# Patient Record
Sex: FEMALE | Race: WHITE | NOT HISPANIC OR LATINO | Employment: FULL TIME | URBAN - METROPOLITAN AREA
[De-identification: names, ages, dates, MRNs, and addresses within clinical notes are randomized per-mention and may not be internally consistent; named-entity substitution may affect disease eponyms.]

---

## 2017-01-29 ENCOUNTER — OFFICE VISIT - HEALTHEAST (OUTPATIENT)
Dept: FAMILY MEDICINE | Facility: CLINIC | Age: 57
End: 2017-01-29

## 2017-01-29 DIAGNOSIS — R05.9 COUGH: ICD-10-CM

## 2017-01-29 DIAGNOSIS — J40 BRONCHITIS: ICD-10-CM

## 2017-01-29 RX ORDER — ALBUTEROL SULFATE 90 UG/1
2 AEROSOL, METERED RESPIRATORY (INHALATION) EVERY 4 HOURS PRN
Qty: 1 INHALER | Refills: 0 | Status: SHIPPED | OUTPATIENT
Start: 2017-01-29 | End: 2023-12-23

## 2021-05-26 ENCOUNTER — RECORDS - HEALTHEAST (OUTPATIENT)
Dept: ADMINISTRATIVE | Facility: CLINIC | Age: 61
End: 2021-05-26

## 2021-05-29 ENCOUNTER — RECORDS - HEALTHEAST (OUTPATIENT)
Dept: ADMINISTRATIVE | Facility: CLINIC | Age: 61
End: 2021-05-29

## 2021-05-30 VITALS — WEIGHT: 123.4 LBS

## 2021-06-08 NOTE — PROGRESS NOTES
ASSESSMENT:   1. Cough  XR Chest PA and Lateral    benzonatate (TESSALON PERLES) 100 MG capsule   2. Bronchitis  albuterol (PROVENTIL HFA;VENTOLIN HFA) 90 mcg/actuation inhaler    predniSONE (DELTASONE) 20 MG tablet         History suggestive of influenza last week, followed by worsening cough. Top differentials include post-infectious bronchitis and secondary pneumonia. CXR negative for infiltrates. As she is afebrile now, and has not had fevers for 2 days, less likely pneumonia. I will treat for post-infectious bronchitis. I am using oral steroids in addition to inhaled albuterol due to severity of cough.  History and exam not suggestive of PE. No pulmonary edema or cardiomegaly on CXR suggesting heart failure or myocarditis following influenza. I do not think further workup is indicated today.    At the end of the encounter, I discussed results, diagnosis, medications. Discussed red flags for immediate return to clinic/ER, as well as indications for follow up if no improvement. Patient understood and agreed to plan. Patient was stable for discharge.    *I was masked during all patient interactions. Patient was also masked from time of registration.        PLAN:  Your chest xray does not show any signs of pneumonia.    The most likely cause for your symptoms is a post-infectious bronchitis, which can occur after a viral respiratory infection like influenza that you had last week.    This is not an infectious condition. However, it can be very uncomfortable.    Treatment is decreasing inflammation of bronchioles and lungs.  Please take prednisone daily x 5 days in the morning. Take with food.  Please use albuterol inhaler as needed every 4-6 hours for cough, wheezing. Take 2 puffs at a time.    I also sent a prescription for Tessalon Perles cough suppressant drops for the cough to your pharmacy. Take as needed.    If persistent cough or other symptoms in 7-10 days, follow up with your primary care provider.    If  return of fevers, coughing up blood, worsening pain or shortness of breath, or any other concerning symptoms, come back or go to ER immediately.          SUBJECTIVE:   Sherry King is a 56 y.o. female who presents today for evaluation of cough and chest congestion for the last 5 days. Her daughter was diagnosed with influenza on Monday, 1/23, and then patient developed fevers, body aches the day after. Fevers lasted until Friday, 1/27. She developed a bad cough on Friday. The cough is wet. It is worse at night. She complains of a lot of chest congestion. She denies shortness of breath, or hemoptysis. She admits to chest soreness from coughing- worse with deep inhalation or talking. No exertional worsening of chest pain. No nausea, vomiting. She had diarrhea last week but this is gone now. No sinus pressure, rhinorrhea, sore throat, or ear pain. She has a decreased appetite but is able to tolerate fluid intake. She has been taking Tylenol Cold and Flu for symptoms which helps somewhat.  No history of chronic lung disease. No recent travel.      Past Medical History:  Otherwise healthy; no chronic medical conditions    Surgical History:  Reviewed; Non-contributory      Family History:  Mother- colon cancer, TIAs  Father- CAD        Social History:    History   Smoking Status     Not on file   Smokeless Tobacco     Not on file     Smoking: none  Alcohol use: occasionally  Other drug use: none  Occupation: retail management      Current Medications:  No current outpatient prescriptions on file prior to visit.     No current facility-administered medications on file prior to visit.        Allergies:   No Known Allergies    I personally reviewed patient's past medical, surgical, social, family history and allergies.    ROS:  Review of Systems  See HPI      OBJECTIVE:   Visit Vitals     /60     Pulse 79     Temp 98.2  F (36.8  C) (Oral)     Resp 20     Wt 123 lb 6.4 oz (56 kg)     SpO2 98%         General  Appearance:  Alert, well-appearing female in NAD. Afebrile.    Integument: Warm, dry, no diaphoresis.  HEENT:  Head: Atraumatic, normocephalic. Face nontraumatic.  Eyes: Conjunctiva clear, Lids normal.  Ears:  TMs pearly, translucent bilaterally. No canal erythema or edema.  Nose: nares patent. Mild erythema of nasal mucosa. No rhinorrhea. No sinus tenderness.  Oropharynx:  No trismus. ++ posterior pharyngeal erythema. No tonsillar hypertrophy. Uvula midline. Moist mucus membranes.  Neck: Supple, + shotty anterior lymphadenopathy. No meningismus.  Respiratory: No distress. No wheezes. Bibasilar crackles. No rhonchi.   Cardiovascular: Regular rate and rhythm, no murmur, rub or gallop. No obvious chest wall deformities. Peripheral pulses 2+ bilaterally at radials. No peripheral edema.  Musculoskeletal: No calf tenderness.  Neurologic: Alert and orientated appropriately. No focal deficits.           Radiology:  I personally ordered and viewed this study. I agree with below radiology findings.    Xr Chest Pa And Lateral    Result Date: 1/29/2017  XR CHEST PA AND LATERAL1/29/2017 1:46 PMINDICATION: coughCOMPARISON: None.FINDINGS: Negative chest.This report was electronically interpreted by: Dr. Emilee Camejo MD ON 01/29/2017 at 14:06

## 2023-11-26 ENCOUNTER — TELEPHONE (OUTPATIENT)
Dept: OBGYN | Facility: CLINIC | Age: 63
End: 2023-11-26
Payer: COMMERCIAL

## 2023-11-26 DIAGNOSIS — M54.50 ACUTE LOW BACK PAIN, UNSPECIFIED BACK PAIN LATERALITY, UNSPECIFIED WHETHER SCIATICA PRESENT: Primary | ICD-10-CM

## 2023-11-26 RX ORDER — CYCLOBENZAPRINE HCL 10 MG
10 TABLET ORAL 3 TIMES DAILY PRN
Qty: 30 TABLET | Refills: 0 | Status: SHIPPED | OUTPATIENT
Start: 2023-11-26 | End: 2023-12-23

## 2023-12-23 ENCOUNTER — APPOINTMENT (OUTPATIENT)
Dept: CT IMAGING | Facility: HOSPITAL | Age: 63
End: 2023-12-23
Attending: EMERGENCY MEDICINE
Payer: COMMERCIAL

## 2023-12-23 ENCOUNTER — HOSPITAL ENCOUNTER (INPATIENT)
Facility: HOSPITAL | Age: 63
LOS: 4 days | Discharge: HOME | End: 2023-12-29
Attending: EMERGENCY MEDICINE | Admitting: FAMILY MEDICINE
Payer: COMMERCIAL

## 2023-12-23 ENCOUNTER — OFFICE VISIT (OUTPATIENT)
Dept: FAMILY MEDICINE | Facility: CLINIC | Age: 63
End: 2023-12-23
Payer: COMMERCIAL

## 2023-12-23 VITALS
RESPIRATION RATE: 16 BRPM | WEIGHT: 137 LBS | HEART RATE: 101 BPM | SYSTOLIC BLOOD PRESSURE: 114 MMHG | OXYGEN SATURATION: 99 % | DIASTOLIC BLOOD PRESSURE: 73 MMHG | TEMPERATURE: 97.5 F

## 2023-12-23 DIAGNOSIS — J81.0 ACUTE PULMONARY EDEMA (H): ICD-10-CM

## 2023-12-23 DIAGNOSIS — R07.9 CHEST PAIN, UNSPECIFIED TYPE: ICD-10-CM

## 2023-12-23 DIAGNOSIS — R07.9 ACUTE CHEST PAIN: Primary | ICD-10-CM

## 2023-12-23 DIAGNOSIS — R00.0 RACING HEART BEAT: ICD-10-CM

## 2023-12-23 DIAGNOSIS — R93.1 LVEF <40%: ICD-10-CM

## 2023-12-23 DIAGNOSIS — I44.7 LBBB (LEFT BUNDLE BRANCH BLOCK): Primary | ICD-10-CM

## 2023-12-23 LAB
ANION GAP SERPL CALCULATED.3IONS-SCNC: 14 MMOL/L (ref 7–15)
ATRIAL RATE - MUSE: 92 BPM
BASOPHILS # BLD AUTO: 0 10E3/UL (ref 0–0.2)
BASOPHILS NFR BLD AUTO: 1 %
BUN SERPL-MCNC: 15.4 MG/DL (ref 8–23)
CALCIUM SERPL-MCNC: 9.6 MG/DL (ref 8.8–10.2)
CHLORIDE SERPL-SCNC: 98 MMOL/L (ref 98–107)
CREAT SERPL-MCNC: 0.74 MG/DL (ref 0.51–0.95)
CREAT SERPL-MCNC: 0.78 MG/DL (ref 0.51–0.95)
D DIMER PPP FEU-MCNC: 2.76 UG/ML FEU (ref 0–0.5)
DEPRECATED HCO3 PLAS-SCNC: 26 MMOL/L (ref 22–29)
DIASTOLIC BLOOD PRESSURE - MUSE: NORMAL MMHG
EGFRCR SERPLBLD CKD-EPI 2021: 84 ML/MIN/1.73M2
EGFRCR SERPLBLD CKD-EPI 2021: 90 ML/MIN/1.73M2
EOSINOPHIL # BLD AUTO: 0.1 10E3/UL (ref 0–0.7)
EOSINOPHIL NFR BLD AUTO: 1 %
ERYTHROCYTE [DISTWIDTH] IN BLOOD BY AUTOMATED COUNT: 13.3 % (ref 10–15)
GLUCOSE SERPL-MCNC: 98 MG/DL (ref 70–99)
HCT VFR BLD AUTO: 38.6 % (ref 35–47)
HGB BLD-MCNC: 13 G/DL (ref 11.7–15.7)
IMM GRANULOCYTES # BLD: 0 10E3/UL
IMM GRANULOCYTES NFR BLD: 0 %
INTERPRETATION ECG - MUSE: NORMAL
LYMPHOCYTES # BLD AUTO: 1.3 10E3/UL (ref 0.8–5.3)
LYMPHOCYTES NFR BLD AUTO: 31 %
MAGNESIUM SERPL-MCNC: 1.9 MG/DL (ref 1.7–2.3)
MCH RBC QN AUTO: 32.7 PG (ref 26.5–33)
MCHC RBC AUTO-ENTMCNC: 33.7 G/DL (ref 31.5–36.5)
MCV RBC AUTO: 97 FL (ref 78–100)
MONOCYTES # BLD AUTO: 0.4 10E3/UL (ref 0–1.3)
MONOCYTES NFR BLD AUTO: 10 %
NEUTROPHILS # BLD AUTO: 2.5 10E3/UL (ref 1.6–8.3)
NEUTROPHILS NFR BLD AUTO: 57 %
NRBC # BLD AUTO: 0 10E3/UL
NRBC BLD AUTO-RTO: 0 /100
NT-PROBNP SERPL-MCNC: 2726 PG/ML (ref 0–900)
P AXIS - MUSE: 68 DEGREES
PLATELET # BLD AUTO: 174 10E3/UL (ref 150–450)
POTASSIUM SERPL-SCNC: 4.2 MMOL/L (ref 3.4–5.3)
PR INTERVAL - MUSE: 146 MS
QRS DURATION - MUSE: 138 MS
QT - MUSE: 434 MS
QTC - MUSE: 536 MS
R AXIS - MUSE: -50 DEGREES
RBC # BLD AUTO: 3.97 10E6/UL (ref 3.8–5.2)
SODIUM SERPL-SCNC: 138 MMOL/L (ref 135–145)
SYSTOLIC BLOOD PRESSURE - MUSE: NORMAL MMHG
T AXIS - MUSE: 108 DEGREES
T4 FREE SERPL-MCNC: 1.08 NG/DL (ref 0.9–1.7)
TROPONIN T SERPL HS-MCNC: 29 NG/L
TROPONIN T SERPL HS-MCNC: 30 NG/L
TSH SERPL DL<=0.005 MIU/L-ACNC: 7.94 UIU/ML (ref 0.3–4.2)
VENTRICULAR RATE- MUSE: 92 BPM
WBC # BLD AUTO: 4.3 10E3/UL (ref 4–11)

## 2023-12-23 PROCEDURE — 84484 ASSAY OF TROPONIN QUANT: CPT | Performed by: EMERGENCY MEDICINE

## 2023-12-23 PROCEDURE — 99205 OFFICE O/P NEW HI 60 MIN: CPT | Mod: 25 | Performed by: PHYSICIAN ASSISTANT

## 2023-12-23 PROCEDURE — 96372 THER/PROPH/DIAG INJ SC/IM: CPT

## 2023-12-23 PROCEDURE — 258N000003 HC RX IP 258 OP 636: Performed by: EMERGENCY MEDICINE

## 2023-12-23 PROCEDURE — 84443 ASSAY THYROID STIM HORMONE: CPT | Performed by: EMERGENCY MEDICINE

## 2023-12-23 PROCEDURE — G0378 HOSPITAL OBSERVATION PER HR: HCPCS

## 2023-12-23 PROCEDURE — 36415 COLL VENOUS BLD VENIPUNCTURE: CPT | Performed by: EMERGENCY MEDICINE

## 2023-12-23 PROCEDURE — 85041 AUTOMATED RBC COUNT: CPT | Performed by: EMERGENCY MEDICINE

## 2023-12-23 PROCEDURE — 250N000011 HC RX IP 250 OP 636: Mod: JZ

## 2023-12-23 PROCEDURE — 85379 FIBRIN DEGRADATION QUANT: CPT | Performed by: EMERGENCY MEDICINE

## 2023-12-23 PROCEDURE — 93010 ELECTROCARDIOGRAM REPORT: CPT | Performed by: GENERAL ACUTE CARE HOSPITAL

## 2023-12-23 PROCEDURE — 250N000011 HC RX IP 250 OP 636: Mod: JZ | Performed by: EMERGENCY MEDICINE

## 2023-12-23 PROCEDURE — 83880 ASSAY OF NATRIURETIC PEPTIDE: CPT | Performed by: EMERGENCY MEDICINE

## 2023-12-23 PROCEDURE — 82565 ASSAY OF CREATININE: CPT

## 2023-12-23 PROCEDURE — 96361 HYDRATE IV INFUSION ADD-ON: CPT

## 2023-12-23 PROCEDURE — 93005 ELECTROCARDIOGRAM TRACING: CPT | Performed by: PHYSICIAN ASSISTANT

## 2023-12-23 PROCEDURE — 93005 ELECTROCARDIOGRAM TRACING: CPT | Performed by: EMERGENCY MEDICINE

## 2023-12-23 PROCEDURE — 96374 THER/PROPH/DIAG INJ IV PUSH: CPT

## 2023-12-23 PROCEDURE — 80048 BASIC METABOLIC PNL TOTAL CA: CPT | Performed by: EMERGENCY MEDICINE

## 2023-12-23 PROCEDURE — 71275 CT ANGIOGRAPHY CHEST: CPT

## 2023-12-23 PROCEDURE — 99285 EMERGENCY DEPT VISIT HI MDM: CPT | Mod: 25

## 2023-12-23 PROCEDURE — 83735 ASSAY OF MAGNESIUM: CPT | Performed by: EMERGENCY MEDICINE

## 2023-12-23 PROCEDURE — 84439 ASSAY OF FREE THYROXINE: CPT | Performed by: EMERGENCY MEDICINE

## 2023-12-23 RX ORDER — AMOXICILLIN 250 MG
2 CAPSULE ORAL 2 TIMES DAILY PRN
Status: DISCONTINUED | OUTPATIENT
Start: 2023-12-23 | End: 2023-12-29 | Stop reason: HOSPADM

## 2023-12-23 RX ORDER — FUROSEMIDE 20 MG
20 TABLET ORAL
Status: DISCONTINUED | OUTPATIENT
Start: 2023-12-24 | End: 2023-12-26

## 2023-12-23 RX ORDER — ENOXAPARIN SODIUM 100 MG/ML
40 INJECTION SUBCUTANEOUS EVERY 24 HOURS
Status: DISCONTINUED | OUTPATIENT
Start: 2023-12-23 | End: 2023-12-29 | Stop reason: HOSPADM

## 2023-12-23 RX ORDER — LIDOCAINE 40 MG/G
CREAM TOPICAL
Status: DISCONTINUED | OUTPATIENT
Start: 2023-12-23 | End: 2023-12-26

## 2023-12-23 RX ORDER — AMOXICILLIN 250 MG
1 CAPSULE ORAL 2 TIMES DAILY PRN
Status: DISCONTINUED | OUTPATIENT
Start: 2023-12-23 | End: 2023-12-29 | Stop reason: HOSPADM

## 2023-12-23 RX ORDER — ACETAMINOPHEN 650 MG/1
650 SUPPOSITORY RECTAL EVERY 4 HOURS PRN
Status: DISCONTINUED | OUTPATIENT
Start: 2023-12-23 | End: 2023-12-29 | Stop reason: HOSPADM

## 2023-12-23 RX ORDER — SODIUM FLUORIDE 1.1 G/100G
1 CREAM ORAL DAILY
COMMUNITY
Start: 2023-12-22

## 2023-12-23 RX ORDER — IOPAMIDOL 755 MG/ML
90 INJECTION, SOLUTION INTRAVASCULAR ONCE
Status: COMPLETED | OUTPATIENT
Start: 2023-12-23 | End: 2023-12-23

## 2023-12-23 RX ORDER — ONDANSETRON 2 MG/ML
4 INJECTION INTRAMUSCULAR; INTRAVENOUS EVERY 6 HOURS PRN
Status: DISCONTINUED | OUTPATIENT
Start: 2023-12-23 | End: 2023-12-25

## 2023-12-23 RX ORDER — PROCHLORPERAZINE 25 MG
25 SUPPOSITORY, RECTAL RECTAL EVERY 12 HOURS PRN
Status: DISCONTINUED | OUTPATIENT
Start: 2023-12-23 | End: 2023-12-25

## 2023-12-23 RX ORDER — ONDANSETRON 4 MG/1
4 TABLET, ORALLY DISINTEGRATING ORAL EVERY 6 HOURS PRN
Status: DISCONTINUED | OUTPATIENT
Start: 2023-12-23 | End: 2023-12-25

## 2023-12-23 RX ORDER — ACETAMINOPHEN 325 MG/1
650 TABLET ORAL EVERY 4 HOURS PRN
Status: DISCONTINUED | OUTPATIENT
Start: 2023-12-23 | End: 2023-12-29 | Stop reason: HOSPADM

## 2023-12-23 RX ORDER — PROCHLORPERAZINE MALEATE 10 MG
10 TABLET ORAL EVERY 6 HOURS PRN
Status: DISCONTINUED | OUTPATIENT
Start: 2023-12-23 | End: 2023-12-25

## 2023-12-23 RX ORDER — CALCIUM CARBONATE 500 MG/1
1000 TABLET, CHEWABLE ORAL 4 TIMES DAILY PRN
Status: DISCONTINUED | OUTPATIENT
Start: 2023-12-23 | End: 2023-12-29 | Stop reason: HOSPADM

## 2023-12-23 RX ORDER — FUROSEMIDE 10 MG/ML
20 INJECTION INTRAMUSCULAR; INTRAVENOUS ONCE
Status: COMPLETED | OUTPATIENT
Start: 2023-12-23 | End: 2023-12-23

## 2023-12-23 RX ADMIN — IOPAMIDOL 90 ML: 755 INJECTION, SOLUTION INTRAVENOUS at 19:05

## 2023-12-23 RX ADMIN — ENOXAPARIN SODIUM 40 MG: 40 INJECTION SUBCUTANEOUS at 22:42

## 2023-12-23 RX ADMIN — SODIUM CHLORIDE 500 ML: 9 INJECTION, SOLUTION INTRAVENOUS at 18:10

## 2023-12-23 RX ADMIN — FUROSEMIDE 20 MG: 10 INJECTION, SOLUTION INTRAMUSCULAR; INTRAVENOUS at 19:58

## 2023-12-23 ASSESSMENT — ACTIVITIES OF DAILY LIVING (ADL)
ADLS_ACUITY_SCORE: 35

## 2023-12-23 NOTE — PROGRESS NOTES
Patient presents with:  Chest Pain: X2d, comes and goes, chest tightness, racing heartbeat, chills/sweats    (R07.9) Acute chest pain  (primary encounter diagnosis)  Comment: with chest tightness and fatigue.  No current pain at urgent care now.    Plan: EKG 12-lead, tracing only, CANCELED: EKG         12-lead, tracing only            TO ED now for further evaluation.  Patient prefers to drive herself today.  [facility] is directly across the street.          At the end of the encounter, I discussed results, diagnosis, medications. Discussed red flags for immediate return to clinic/ER, as well as indications for follow up if no improvement. Patient understood and agreed to plan. Patient was stable for discharge     Discussed case with ED physician at [facility] who will see patient for further evaluation in the ED now.      SUBJECTIVE:   Sherry King is a 63 year old female who presents today with a history of chest pain with exertion at work 2 days ago.  Not currently having pain now, but has noticed that her heart rate has been elevated.    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      She denies any significant PMH and denies any previous cardiac history.    States that she only has a PMH significant for varicose veins.      She states that her family history is positive for Heart Disease.      [facility]      Current Outpatient Medications   Medication Sig Dispense Refill    Multiple Vitamins-Iron (DAILY-CALDERON/IRON/BETA-CAROTENE) TABS TAKE 1 TABLET BY MOUTH DAILY. (Patient not taking: Reported on 10/19/2020) 30 tablet 7     Social History     Tobacco Use    Smoking status: Never Smoker    Smokeless tobacco: Never Used   Substance Use Topics    Alcohol use: Not on file     Family History   Problem Relation Age of Onset     Diabetes Mother     Diabetes Father          ROS:    10 point ROS of systems including Constitutional, Eyes, Respiratory, Cardiovascular, Gastroenterology, Genitourinary, Integumentary, Muscularskeletal, Psychiatric ,neurological were all negative except for pertinent positives noted in my HPI       OBJECTIVE:  /73   Pulse 101   Temp 97.5  F (36.4  C) (Oral)   Resp 16   Wt 62.1 kg (137 lb)   SpO2 99%   Physical Exam:  GENERAL APPEARANCE: healthy, alert and no distress  RESP: lungs clear to auscultation - no rales, rhonchi or wheezes  CV: regular rates and rhythm, normal S1 S2, no murmur noted  NEURO: Normal strength and tone, sensory exam grossly normal,  normal speech and mentation  SKIN: no suspicious lesions or rashes    EKG: as per cardiology interpretation: LBBB, CAMERON.  Left axis deviation.

## 2023-12-23 NOTE — Clinical Note
A venogram was performed on the left subclavian. Injected volume = 10 mL.
All leads
CS lead repositioned and re-tested multiple times
Catheter redirected to right coronary artery.
Catheter removed over wire.
Check Implants.
Check Implants.
Check Procedures.
Check Procedures.
Conscious sedation is planned for this procedure.    Provider immediate assessment completed. 
Conscious sedation is planned for this procedure.    Provider immediate assessment completed. 
DDD .
Family has been updated.
Family has been updated.
Generator attached. [employer] [place] XT HF CRT-D MRI SureScan, Model# [id number], SN [id number], Exp date 2025-03-28
Generator inserted into pocket.
Grounding pads were placed on the right hip
HIS
Hemodynamic equipment used: 5 lead ECG, LIKEPAK Hands Off Patches, LIFEPAK With 3 Leads, Calometric ETCO2 device, Machine BP Cuff and pulse oximeter probe.
Hemodynamic equipment used: 5 lead ECG, LIKEPAK Hands Off Patches, LIFEPAK With 3 Leads, Machine BP Cuff and pulse oximeter probe.
Incision closed and approximated by JACQUES
Incision made.
Irrigated with antibiotic solution. 
LCA Cine(s)  injected and visualized utilizing power injector system.
Lab results reviewed and abnormals discussed with physician.
Lab results reviewed and abnormals discussed with physician.
Lead advanced under fluoro to the coronary sinus.
Lead advanced under fluoro to the right atrium.
Lead advanced under fluoro to the right ventricle.
Lead repositioned. 
Minimal.
Patient education provided. 
Patient to floor Tele.
Patient to floor Tele.
Pocket closure completed by physician.
Pocket formed.
Pre-calculated contrast dose 333 ml
Prepped: groin and right radial. Prepped with: ChloraPrep. The patient was draped. .Pre-procedure site marking:Insertion site not predetermined
Prepped: left chest. Prepped with: ChloraPrep. The patient was draped. .Pre-procedure site marking:No
Procedure scheduled as Urgent.
Procedure scheduled as Urgent.
R-Band utilized for closure of site.  ; hemostasis achieved.
RA
RCA Cine(s)  injected and visualized utilizing power injector system.
RV
Removed intact
Removed intact
Right arm board applied.
Sheath prepped and inserted in the left subclavian vein.
Site: [facility]  Type:  [facility]   ANNETTE [employer]aut[employer] Serial Number:FX SN [id number]
Subclavian vein accessed x2
Subclavian vein accessed x3
Tested the atrial lead. See vendor printout/MD dictation.
Tested the atrial lead. See vendor printout/MD dictation.
Tested the atrial lead. See vendor printout/MD dictation. [employer] MRI SureScan 5076 - 52cm, SN [id number], Exp date 2025-10-12
Tested the left ventricular lead. See vendor printout/MD dictation.
Tested the left ventricular lead. See vendor printout/MD dictation. [employer] SelectSure MRI SureScan 3830 - 69cm, SN [id number], Exp date 2025-11-01
Tested the right ventricular lead. See vendor printout/MD dictation. [employer] Quattro Secure S MRI SureScan 6935M - 62cm, SN [id number], Exp date 2025-03-22
The DP pulses are 1+ bilaterally. The PT pulses are 1+ bilaterally.
The ECG shows a sinus rhythm and a bundle branch block. ECG rate  = 68 bpm.
The ECG shows a sinus tachycardia. ECG rate  = 119 bpm.
There were no immediate complications during the procedure.
Total contrast given (ml) 10
Total contrast given (ml) 30
adhesive bandage applied to wound securely.
dry, intact, no bleeding and no hematoma.
telephone
n/a

## 2023-12-23 NOTE — PATIENT INSTRUCTIONS
(R07.9) Acute chest pain  (primary encounter diagnosis)  Comment: with chest tightness and fatigue.  No current pain at urgent care now.    Plan: EKG 12-lead, tracing only, CANCELED: EKG         12-lead, tracing only            TO ED now for further evaluation.  Patient prefers to drive herself today.  [facility] is directly across the street.

## 2023-12-23 NOTE — ED TRIAGE NOTES
Pt presents from clinic with a heart rate of 150-160s while at rest. Recent left shoulder blade pain and chest tightening. No hx of cardiac issues.     Triage Assessment (Adult)       Row Name 12/23/23 6512          Triage Assessment    Airway WDL WDL        Respiratory WDL    Respiratory WDL WDL        Skin Circulation/Temperature WDL    Skin Circulation/Temperature WDL WDL        Cardiac WDL    Cardiac WDL X;chest pain;rhythm     Pulse Rate & Regularity tachycardic        Peripheral/Neurovascular WDL    Peripheral Neurovascular WDL WDL        Cognitive/Neuro/Behavioral WDL    Cognitive/Neuro/Behavioral WDL WDL

## 2023-12-23 NOTE — ED PROVIDER NOTES
EMERGENCY DEPARTMENT ENCOUNTER      NAME: Sherry King  AGE: 63 year old female  YOB: 1960  MRN: [id number]  EVALUATION DATE & TIME: 12/23/2023  5:11 PM    PCP: No Ref-Primary, Physician    ED PROVIDER: Leilani Vernon M.D.      Chief Complaint   Patient presents with    Chest Pain    Tachycardia     FINAL IMPRESSION:  1. Chest pain, unspecified type    2. Acute pulmonary edema (H)    3. Racing heart beat      ED COURSE & MEDICAL DECISION MAKING:    Pertinent Labs & Imaging studies reviewed. (See chart for details)  ED Course as of 12/23/23 2221   Sat Dec 23, 2023   1759 Patient is a pleasant 63-year-old female comes in today for evaluation of some intermittent tightness in her chest and shortness of breath as well as some racing heartbeat.  2 days ago she noticed that she was at work and developed these episodes where she had tightness in her chest and feels short of breath and little sweaty.  She did rest for 30 minutes or so and it would resolve.  Today she noticed that her heart rate was elevated in the 160s but she was not having any of the chest tightness.  She was having the racing heart over the last couple of days and these episodes were happening.  She feels her heart rate increases more with exertion.  She has no known cardiac history and denies any leg swelling or history of DVT or PEs.  She complains of little bit of pain in her left upper back but no tenderness to the area.  She is not having any pain when she breathes.  Will check blood work including D-dimer and troponin.  Will get a chest x-ray on her and depending on what the D-dimer looks like it may get a CT scan.  Give her little bit of fluid here.  I discussed the plan with her and she is in agreement with it.   1851 Patient's D-dimer is elevated to 2.76.  Her BNP is elevated.  Her initial troponin is slightly elevated.  I canceled her chest x-ray and we will plan to get CT angiogram on her.  TSH is a little elevated at 7.94  but waiting on the free T4.   1950 I went back and talk to the patient.  We discussed her results and I think she should come into the hospital.  She had some little bit of pulmonary edema and cardiomegaly on her chest CT but no pulmonary embolism.  I think with what she is describing and her BNP being elevated she needs further workup including an echocardiogram.  I discussed this with her and she was in agreement with coming in the hospital with the hope that we could get her home tomorrow.  I told her I could not make any promises but that we would not be holding her against her will.  She is in agreement with that plan.  I will put a call out to the admitting team.   2002 Discussed admission with the [place] resident.  He is in agreement with cardiac telemetry observation admission for further evaluation of concern for new heart failure.       5:59 PM I met the patient and performed my initial interview and exam.   7:46 PM I updated and rechecked the patient.  8:01 PM I spoke with Dr. Wade, [place].    Medical Decision Making    History:  Supplemental history from: None  External Record(s) reviewed: I reviewed the note from [facility] from earlier today.  Patient had presented with chest pain that was coming and going for the last 2 days with racing heartbeat and chills and sweats.  EKG there showed left bundle branch block which was unchanged from previous EKG.  Patient was sent to the emergency department for further evaluation.    Work Up:  Emergent/Severe conditions considered and evaluated for: ACS, pericarditis, myocarditis, pneumothorax, pneumonia, esophageal rupture, pulmonary embolism.   I independently reviewed and interpreted EKG and CT chest which showed no pulmonary embolism.  See full radiology report for all details  In additional to work up documented, I considered the following work up: None  Medications given that require intensive monitoring for toxicity: None    External  consultation:  Discussion of management with another provider: Hospitalist    Complicating factors:  Care impacted by chronic illness: None  Care affected by social determinants of health: None    Disposition considerations: Admission    At the conclusion of the encounter I discussed  the results of all of the tests and the disposition with patient.   All questions were answered.  The patient acknowledged understanding and was involved in the decision making regarding the overall care plan.      MEDICATIONS GIVEN IN THE EMERGENCY:  Medications   lidocaine 1 % 0.1-1 mL (has no administration in time range)   lidocaine (LMX4) cream (has no administration in time range)   sodium chloride (PF) 0.9% PF flush 3 mL (has no administration in time range)   sodium chloride (PF) 0.9% PF flush 3 mL (has no administration in time range)   senna-docusate (SENOKOT-S/PERICOLACE) 8.6-50 MG per tablet 1 tablet (has no administration in time range)     Or   senna-docusate (SENOKOT-S/PERICOLACE) 8.6-50 MG per tablet 2 tablet (has no administration in time range)   calcium carbonate (TUMS) chewable tablet 1,000 mg (has no administration in time range)   enoxaparin ANTICOAGULANT (LOVENOX) injection 40 mg (has no administration in time range)   acetaminophen (TYLENOL) tablet 650 mg (has no administration in time range)     Or   acetaminophen (TYLENOL) Suppository 650 mg (has no administration in time range)   melatonin tablet 5 mg (has no administration in time range)   ondansetron (ZOFRAN ODT) ODT tab 4 mg (has no administration in time range)     Or   ondansetron (ZOFRAN) injection 4 mg (has no administration in time range)   prochlorperazine (COMPAZINE) injection 10 mg (has no administration in time range)     Or   prochlorperazine (COMPAZINE) tablet 10 mg (has no administration in time range)     Or   prochlorperazine (COMPAZINE) suppository 25 mg (has no administration in time range)   furosemide (LASIX) tablet 20 mg (has no  administration in time range)   sodium chloride 0.9% BOLUS 500 mL (0 mLs Intravenous Stopped 12/23/23 1948)   iopamidol (ISOVUE-370) solution 90 mL (90 mLs Intravenous $Given 12/23/23 1905)   furosemide (LASIX) injection 20 mg (20 mg Intravenous $Given 12/23/23 1958)     =================================================================    HPI    Triage Note: Pt presents from clinic with a heart rate of 150-160s while at rest. Recent left shoulder blade pain and chest tightening. No hx of cardiac issues.     Triage Assessment (Adult)       Row Name 12/23/23 0719          Triage Assessment    Airway WDL WDL        Respiratory WDL    Respiratory WDL WDL        Skin Circulation/Temperature WDL    Skin Circulation/Temperature WDL WDL        Cardiac WDL    Cardiac WDL X;chest pain;rhythm     Pulse Rate & Regularity tachycardic        Peripheral/Neurovascular WDL    Peripheral Neurovascular WDL WDL        Cognitive/Neuro/Behavioral WDL    Cognitive/Neuro/Behavioral WDL WDL                     Patient information was obtained from: Patient     Use of : N/A       Sherry King is a 63 year old female who presents to the ED by referral for evaluation of chest tightness and palpitations.    The patient was at work 2 days ago when she experienced an episode of chest tightness, palpations, shortness of breath and hot and cold flashes. The patient then sat down until the episode passed after 30 minutes. This morning, the patient endorsed palpitations and checked her heart rate to be 165 BPM at home. She states that her palpitations increase with exertion. The patient denies cough, fever, leg swelling or any other complaints at this time. She has no personal cardiac history but does have a family cardiac history.     PAST MEDICAL HISTORY:  History reviewed. No pertinent past medical history.    PAST SURGICAL HISTORY:  History reviewed. No pertinent surgical history.    CURRENT MEDICATIONS:      Current  "Facility-Administered Medications:     acetaminophen (TYLENOL) tablet 650 mg, 650 mg, Oral, Q4H PRN **OR** acetaminophen (TYLENOL) Suppository 650 mg, 650 mg, Rectal, Q4H PRN, Je Wade MD    calcium carbonate (TUMS) chewable tablet 1,000 mg, 1,000 mg, Oral, 4x Daily PRN, Je Wade MD    enoxaparin ANTICOAGULANT (LOVENOX) injection 40 mg, 40 mg, Subcutaneous, Q24H, Je Wade MD    [START ON 12/24/2023] furosemide (LASIX) tablet 20 mg, 20 mg, Oral, BID, Je Wade MD    lidocaine (LMX4) cream, , Topical, Q1H PRN, Je Wade MD    lidocaine 1 % 0.1-1 mL, 0.1-1 mL, Other, Q1H PRN, Je Wade MD    melatonin tablet 5 mg, 5 mg, Oral, At Bedtime PRN, Je Wade MD    ondansetron (ZOFRAN ODT) ODT tab 4 mg, 4 mg, Oral, Q6H PRN **OR** ondansetron (ZOFRAN) injection 4 mg, 4 mg, Intravenous, Q6H PRN, Je Wade MD    prochlorperazine (COMPAZINE) injection 10 mg, 10 mg, Intravenous, Q6H PRN **OR** prochlorperazine (COMPAZINE) tablet 10 mg, 10 mg, Oral, Q6H PRN **OR** prochlorperazine (COMPAZINE) suppository 25 mg, 25 mg, Rectal, Q12H PRN, Je Wade MD    senna-docusate (SENOKOT-S/PERICOLACE) 8.6-50 MG per tablet 1 tablet, 1 tablet, Oral, BID PRN **OR** senna-docusate (SENOKOT-S/PERICOLACE) 8.6-50 MG per tablet 2 tablet, 2 tablet, Oral, BID PRN, Je Wade MD    sodium chloride (PF) 0.9% PF flush 3 mL, 3 mL, Intracatheter, Q8H, Je Wade MD    sodium chloride (PF) 0.9% PF flush 3 mL, 3 mL, Intracatheter, q1 min prn, Je Wade MD    ALLERGIES:  No Known Allergies    FAMILY HISTORY:  History reviewed. No pertinent family history.    SOCIAL HISTORY:   Social History     Socioeconomic History    Marital status:    Tobacco Use    Smoking status: Never     Passive exposure: Never    Smokeless tobacco: Never       PHYSICAL EXAM    VITAL SIGNS: /72 (BP Location: Left arm)   Pulse 86   Temp 98.2  F (36.8  C) (Oral)   Resp 16   Ht 1.778 m (5' 10\")   Wt " 61.4 kg (135 lb 4.8 oz)   SpO2 99%   BMI 19.41 kg/m     GENERAL: Awake, alert, answering questions appropriately  SPEECH:  Easy to understand speech, Normal volume and giles  PULMONARY: No respiratory distress, Lungs clear to auscultation bilaterally  CARDIOVASCULAR: Tachycardic, regular rhythm, Distal pulses present and normal. No tenderness to chest or back.  ABDOMINAL: Soft, Nondistended, Nontender, No rebound or guarding, No palpable masses  EXTREMITIES: No lower extremity edema.  PSYCH: Normal mood and affect     LAB:  All pertinent labs reviewed and interpreted.  Results for orders placed or performed during the hospital encounter of 12/23/23   CT Chest Pulmonary Embolism w Contrast    Impression    IMPRESSION:  1.  No pulmonary emboli.  2.  Mild cardiomegaly with prominence of left ventricle and suggestion of mild interstitial edema.   Basic metabolic panel   Result Value Ref Range    Sodium 138 135 - 145 mmol/L    Potassium 4.2 3.4 - 5.3 mmol/L    Chloride 98 98 - 107 mmol/L    Carbon Dioxide (CO2) 26 22 - 29 mmol/L    Anion Gap 14 7 - 15 mmol/L    Urea Nitrogen 15.4 8.0 - 23.0 mg/dL    Creatinine 0.78 0.51 - 0.95 mg/dL    GFR Estimate 84 >60 mL/min/1.73m2    Calcium 9.6 8.8 - 10.2 mg/dL    Glucose 98 70 - 99 mg/dL   Result Value Ref Range    Troponin T, High Sensitivity 29 (H) <=14 ng/L   Result Value Ref Range    Magnesium 1.9 1.7 - 2.3 mg/dL   TSH with free T4 reflex   Result Value Ref Range    TSH 7.94 (H) 0.30 - 4.20 uIU/mL   Nt probnp inpatient (BNP)   Result Value Ref Range    N terminal Pro BNP Inpatient 2,726 (H) 0 - 900 pg/mL   CBC with platelets and differential   Result Value Ref Range    WBC Count 4.3 4.0 - 11.0 10e3/uL    RBC Count 3.97 3.80 - 5.20 10e6/uL    Hemoglobin 13.0 11.7 - 15.7 g/dL    Hematocrit 38.6 35.0 - 47.0 %    MCV 97 78 - 100 fL    MCH 32.7 26.5 - 33.0 pg    MCHC 33.7 31.5 - 36.5 g/dL    RDW 13.3 10.0 - 15.0 %    Platelet Count 174 150 - 450 10e3/uL    % Neutrophils 57 %     % Lymphocytes 31 %    % Monocytes 10 %    % Eosinophils 1 %    % Basophils 1 %    % Immature Granulocytes 0 %    NRBCs per 100 WBC 0 <1 /100    Absolute Neutrophils 2.5 1.6 - 8.3 10e3/uL    Absolute Lymphocytes 1.3 0.8 - 5.3 10e3/uL    Absolute Monocytes 0.4 0.0 - 1.3 10e3/uL    Absolute Eosinophils 0.1 0.0 - 0.7 10e3/uL    Absolute Basophils 0.0 0.0 - 0.2 10e3/uL    Absolute Immature Granulocytes 0.0 <=0.4 10e3/uL    Absolute NRBCs 0.0 10e3/uL   D dimer quantitative   Result Value Ref Range    D-Dimer Quantitative 2.76 (H) 0.00 - 0.50 ug/mL FEU   Result Value Ref Range    Free T4 1.08 0.90 - 1.70 ng/dL   Troponin T, High Sensitivity (now)   Result Value Ref Range    Troponin T, High Sensitivity 30 (H) <=14 ng/L   Creatinine   Result Value Ref Range    Creatinine 0.74 0.51 - 0.95 mg/dL    GFR Estimate 90 >60 mL/min/1.73m2       RADIOLOGY:  CT Chest Pulmonary Embolism w Contrast   Final Result   IMPRESSION:   1.  No pulmonary emboli.   2.  Mild cardiomegaly with prominence of left ventricle and suggestion of mild interstitial edema.            EKG:    Date and time: December 23, 2023 at 1711  Rate: 101 bpm  Rhythm: Sinus tachycardia  ME interval: 152 ms  QRS interval: 136 ms  QT/QTc: 400/518 ms  ST changes or T wave changes: No acute ST or T wave abnormalities  Change from prior ECG: Left bundle branch block is new from EKG back in 1999  I have independently reviewed and interpreted this EKG.         I, Rajan Ross, am serving as a scribe to document services personally performed by Dr. Vernon based on my observation and the provider's statements to me. I, Leilani Vernon MD attest that Rajan Ross is acting in a scribe capacity, has observed my performance of the services and has documented them in accordance with my direction.    Leilani Vernon M.D.  Emergency Medicine  [facility] EMERGENCY DEPARTMENT  [address]  [address]  [phone]  Dept: [phone]       Leilani Vernon MD  12/23/23 2220

## 2023-12-23 NOTE — ED NOTES
Expected Patient Referral to ED  4:49 PM    Referring Clinic/Provider:  Walk in clinic    Reason for referral/Clinical facts:  Chest pain 2 days ago with exertion. Fatigue. , BP ok, O2 okay. Chest pain has resolved now. LBBB old from 1999.     Recommendations provided:  Send to ED for further evaluation    Caller was informed that this institution does possess the capabilities and/or resources to provide for patient and should be transferred to our facility.      Leilani Vernon MD  [facility] EMERGENCY DEPARTMENT  [facility] [facility] [id number] [phone]       Leilani Vernon MD  12/23/23 5866

## 2023-12-24 ENCOUNTER — APPOINTMENT (OUTPATIENT)
Dept: CARDIOLOGY | Facility: HOSPITAL | Age: 63
End: 2023-12-24
Payer: COMMERCIAL

## 2023-12-24 LAB
ANION GAP SERPL CALCULATED.3IONS-SCNC: 14 MMOL/L (ref 7–15)
ATRIAL RATE - MUSE: 101 BPM
BUN SERPL-MCNC: 16.1 MG/DL (ref 8–23)
CALCIUM SERPL-MCNC: 9.4 MG/DL (ref 8.8–10.2)
CHLORIDE SERPL-SCNC: 99 MMOL/L (ref 98–107)
CHOLEST SERPL-MCNC: 216 MG/DL
CREAT SERPL-MCNC: 0.8 MG/DL (ref 0.51–0.95)
DEPRECATED HCO3 PLAS-SCNC: 27 MMOL/L (ref 22–29)
DIASTOLIC BLOOD PRESSURE - MUSE: NORMAL MMHG
EGFRCR SERPLBLD CKD-EPI 2021: 82 ML/MIN/1.73M2
ERYTHROCYTE [DISTWIDTH] IN BLOOD BY AUTOMATED COUNT: 13.2 % (ref 10–15)
GLUCOSE SERPL-MCNC: 80 MG/DL (ref 70–99)
HCT VFR BLD AUTO: 38.7 % (ref 35–47)
HDLC SERPL-MCNC: 103 MG/DL
HGB BLD-MCNC: 12.8 G/DL (ref 11.7–15.7)
INTERPRETATION ECG - MUSE: NORMAL
LDLC SERPL CALC-MCNC: 98 MG/DL
LVEF ECHO: NORMAL
MAGNESIUM SERPL-MCNC: 2 MG/DL (ref 1.7–2.3)
MCH RBC QN AUTO: 32.2 PG (ref 26.5–33)
MCHC RBC AUTO-ENTMCNC: 33.1 G/DL (ref 31.5–36.5)
MCV RBC AUTO: 98 FL (ref 78–100)
NONHDLC SERPL-MCNC: 113 MG/DL
P AXIS - MUSE: 75 DEGREES
PLATELET # BLD AUTO: 173 10E3/UL (ref 150–450)
POTASSIUM SERPL-SCNC: 3.8 MMOL/L (ref 3.4–5.3)
PR INTERVAL - MUSE: 152 MS
QRS DURATION - MUSE: 136 MS
QT - MUSE: 400 MS
QTC - MUSE: 518 MS
R AXIS - MUSE: -53 DEGREES
RBC # BLD AUTO: 3.97 10E6/UL (ref 3.8–5.2)
SODIUM SERPL-SCNC: 140 MMOL/L (ref 135–145)
SYSTOLIC BLOOD PRESSURE - MUSE: NORMAL MMHG
T AXIS - MUSE: 105 DEGREES
TRIGL SERPL-MCNC: 76 MG/DL
VENTRICULAR RATE- MUSE: 101 BPM
WBC # BLD AUTO: 4.4 10E3/UL (ref 4–11)

## 2023-12-24 PROCEDURE — 255N000002 HC RX 255 OP 636: Performed by: FAMILY MEDICINE

## 2023-12-24 PROCEDURE — 250N000013 HC RX MED GY IP 250 OP 250 PS 637: Performed by: INTERNAL MEDICINE

## 2023-12-24 PROCEDURE — 99255 IP/OBS CONSLTJ NEW/EST HI 80: CPT | Mod: 25 | Performed by: INTERNAL MEDICINE

## 2023-12-24 PROCEDURE — 80061 LIPID PANEL: CPT

## 2023-12-24 PROCEDURE — 36415 COLL VENOUS BLD VENIPUNCTURE: CPT

## 2023-12-24 PROCEDURE — 250N000011 HC RX IP 250 OP 636: Mod: JZ | Performed by: INTERNAL MEDICINE

## 2023-12-24 PROCEDURE — 96376 TX/PRO/DX INJ SAME DRUG ADON: CPT

## 2023-12-24 PROCEDURE — 96372 THER/PROPH/DIAG INJ SC/IM: CPT

## 2023-12-24 PROCEDURE — G0378 HOSPITAL OBSERVATION PER HR: HCPCS

## 2023-12-24 PROCEDURE — 250N000013 HC RX MED GY IP 250 OP 250 PS 637

## 2023-12-24 PROCEDURE — 83735 ASSAY OF MAGNESIUM: CPT | Performed by: FAMILY MEDICINE

## 2023-12-24 PROCEDURE — 85014 HEMATOCRIT: CPT

## 2023-12-24 PROCEDURE — 96375 TX/PRO/DX INJ NEW DRUG ADDON: CPT

## 2023-12-24 PROCEDURE — 93306 TTE W/DOPPLER COMPLETE: CPT | Mod: 26 | Performed by: GENERAL ACUTE CARE HOSPITAL

## 2023-12-24 PROCEDURE — 99223 1ST HOSP IP/OBS HIGH 75: CPT | Mod: AI

## 2023-12-24 PROCEDURE — 80048 BASIC METABOLIC PNL TOTAL CA: CPT

## 2023-12-24 PROCEDURE — 250N000011 HC RX IP 250 OP 636: Mod: JZ

## 2023-12-24 PROCEDURE — C8929 TTE W OR WO FOL WCON,DOPPLER: HCPCS

## 2023-12-24 RX ORDER — SPIRONOLACTONE 25 MG/1
25 TABLET ORAL DAILY
Status: DISCONTINUED | OUTPATIENT
Start: 2023-12-24 | End: 2023-12-27

## 2023-12-24 RX ORDER — VITAMIN B COMPLEX
25 TABLET ORAL DAILY
Status: DISCONTINUED | OUTPATIENT
Start: 2023-12-24 | End: 2023-12-29 | Stop reason: HOSPADM

## 2023-12-24 RX ORDER — ADENOSINE 3 MG/ML
12 INJECTION, SOLUTION INTRAVENOUS ONCE
Status: COMPLETED | OUTPATIENT
Start: 2023-12-24 | End: 2023-12-24

## 2023-12-24 RX ORDER — SODIUM FLUORIDE 5 MG/ML
1 PASTE, DENTIFRICE DENTAL DAILY
Status: DISCONTINUED | OUTPATIENT
Start: 2023-12-24 | End: 2023-12-24

## 2023-12-24 RX ORDER — ADENOSINE 3 MG/ML
12 INJECTION, SOLUTION INTRAVENOUS
Status: COMPLETED | OUTPATIENT
Start: 2023-12-24 | End: 2023-12-24

## 2023-12-24 RX ORDER — DIPHENHYDRAMINE HYDROCHLORIDE 25 MG/1
1 TABLET ORAL DAILY
Status: DISCONTINUED | OUTPATIENT
Start: 2023-12-24 | End: 2023-12-24

## 2023-12-24 RX ORDER — CARVEDILOL 3.12 MG/1
3.12 TABLET ORAL 2 TIMES DAILY WITH MEALS
Status: DISCONTINUED | OUTPATIENT
Start: 2023-12-24 | End: 2023-12-25

## 2023-12-24 RX ADMIN — ACETAMINOPHEN 650 MG: 325 TABLET ORAL at 16:12

## 2023-12-24 RX ADMIN — ENOXAPARIN SODIUM 40 MG: 40 INJECTION SUBCUTANEOUS at 20:00

## 2023-12-24 RX ADMIN — ADENOSINE 12 MG: 3 INJECTION, SOLUTION INTRAVENOUS at 14:19

## 2023-12-24 RX ADMIN — CARVEDILOL 3.12 MG: 3.12 TABLET, FILM COATED ORAL at 16:08

## 2023-12-24 RX ADMIN — PERFLUTREN 2 ML: 6.52 INJECTION, SUSPENSION INTRAVENOUS at 09:22

## 2023-12-24 RX ADMIN — ADENOSINE 12 MG: 3 INJECTION, SOLUTION INTRAVENOUS at 15:24

## 2023-12-24 RX ADMIN — SPIRONOLACTONE 25 MG: 25 TABLET ORAL at 14:20

## 2023-12-24 ASSESSMENT — ACTIVITIES OF DAILY LIVING (ADL)
ADLS_ACUITY_SCORE: 35
DEPENDENT_IADLS:: INDEPENDENT
ADLS_ACUITY_SCORE: 35
ADLS_ACUITY_SCORE: 20

## 2023-12-24 NOTE — MEDICATION SCRIBE - ADMISSION MEDICATION HISTORY
Medication Scribe Admission Medication History    Admission medication history is complete. The information provided in this note is only as accurate as the sources available at the time of the update.    Information Source(s): Patient and CareEverywhere/SureScripts via in-person    Pertinent Information: pt states isn't currently on daily med's    Changes made to PTA medication list:  Added: biotin  Deleted: None  Changed: None    Medication Affordability:  Not including over the counter (OTC) medications, was there a time in the past 3 months when you did not take your medications as prescribed because of cost?: No    Allergies reviewed with patient and updates made in EHR: yes    Medication History Completed By: DEYANIRA BUTLER 12/23/2023 8:47 PM    PTA Med List   Medication Sig Last Dose    BIOTIN PO Take 1 tablet by mouth daily as needed Past Week at prn    Cholecalciferol (VITAMIN D-3 PO) Take 1 tablet by mouth daily as needed Past Week at prn    DENTA 5000 PLUS 1.1 % CREA daily 12/23/2023 at am    MAGNESIUM GLYCINATE PO Take 1 tablet by mouth daily as needed Past Week at prn    MILK THISTLE PO Take 1 tablet by mouth daily as needed Past Week at prn    Turmeric (QC TUMERIC COMPLEX PO) Take 1 tablet by mouth daily as needed Past Week at prm

## 2023-12-24 NOTE — PROGRESS NOTES
[facility]    Progress Note - Hospitalist Service       Date of Admission:  12/23/2023    Assessment & Plan   Sherry King is a 63 year old female admitted on 12/23/2023. She has a history of varicose veins and is admitted for new onset chest tightness, dyspnea with exertion and notes occasional episodes of tachycardia, concerning for new onset of CHF, brief episodes of symptomatic tachycardia.     HFrEF exacerbation - new diagnosis  LBBB  Patient endorses new onset chest pain 2 days prior (12/21) after exertion at her job with associated dyspnea, chills/sweats which spontaneously resolved with rest. No prior cardiac history or significant past medical history. Endorses new SOB w/exertion, although denies PND or orthopnea, no JVD or peripheral edema on exam. D-Dimer 12/23 2.76, CTPE 12/23 was negative for PE but demonstrated mild cardiomegaly w/prominence L ventricle and mild interstitial edema. BNP on admission found to be 2700, BMP WNL, TSH 7.94 with T4 1.08. Troponin 29, 30 on repeat. Sugars wnl, lipids unremarkable. EKG findings 12/23 demonstrating sinus rhythm, ventricular rate 92, LBBB but otherwise reassuring. Received IV diuresis overnight, though fluid status unremarkable on exam. Echo significant for severely reduced systolic function with global hypokinesis and intraventricular dyssynchrony d/t LBBB, EF 20-25%. Patient notes family history of CAD.   - cardiology consulted, appreciate recs  - telemetry     Transient tachycardia, symptomatic - SVT?  Patient has had a couple episodes since admission of tachycardia to 150's for brief spells, lasting a few minutes with associated SOB and dizziness. Initially in NSR on tele strips, but had several episodes of tachycardia overnight. Continues to have tachycardic episodes this AM with symptoms. Suspicion for SVT inducing above issue.   - telemetry  - cardiology consult as above     #Subclinical Hypothyroidism  TSH 7.94, T4 free  1.08 wnl.  - recheck in outpatient           Diet: Combination Diet Regular Diet Adult    DVT Prophylaxis: Enoxaparin (Lovenox) SQ  Lombardo Catheter: Not present  Fluids: PO  Lines: None     Cardiac Monitoring: ACTIVE order. Indication: Chest pain/ ACS rule out (24 hours)  Code Status: Full Code      Clinically Significant Risk Factors Present on Admission                   # Acute heart failure with reduced ejection fraction: last echo with EF <40% and receiving IV diuretics         # Financial/Environmental Concerns: none         Disposition Plan      Expected Discharge Date: 12/24/2023                The patient's care was discussed with the Attending Physician, Dr. Rosenstein .    Vitaly Loyola MD  Hospitalist Service  [facility]  Securely message with [employer] (more info)  Text page via [employer] Paging/Directory   ______________________________________________________________________    Interval History   Doing well this AM.   Reports still occasionally feeling palpitations.   Has never happened previously.   No new life stressors.   Family history significant for premature CAD.  Really hoping to go home today.     Physical Exam   Vital Signs: Temp: 97.5  F (36.4  C) Temp src: Oral BP: 100/61 Pulse: 97   Resp: 18 SpO2: 100 % O2 Device: None (Room air)    Weight: 135 lbs 4.8 oz    General Appearance: Sitting up in bed, pleasant, appears comfortable, alert, NAD.  Respiratory: No increased work of breathing. CTAB- no wheezing, crackles, rhonchi.   Cardiovascular: Intermittent tachycardia, occasional missed beat, otherwise regular rhythm. No peripheral edema.   GI: Soft, nondistended, nontender.   Skin: Dry, warm.   Other: Mildly anxious.      Medical Decision Making       Please see A&P for additional details of medical decision making.      Data   ------------------------- PAST 24 HR DATA REVIEWED -----------------------------------------------

## 2023-12-24 NOTE — CARE PLAN
"PRIMARY DIAGNOSIS: \"GENERIC\" NURSING  OUTPATIENT/OBSERVATION GOALS TO BE MET BEFORE DISCHARGE:  ADLs back to baseline: No    Activity and level of assistance: Up with standby assistance.Not walking in hallway due to safety     Pain status: Pain free.    Return to near baseline physical activity:  Patient continues to have short episodes of Tachycardia. Given adenosine  x2 per Cardiology recommendation.  Dr Garcia here to review EKG. Will discuss possible ablation with patient tomorrow.      Discharge Planner Nurse   Safe discharge environment identified: Yes  Barriers to discharge: Yes       Entered by: Scarlet Singh RN 12/24/2023 3:56 PM     Please review provider order for any additional goals.   Nurse to notify provider when observation goals have been met and patient is ready for discharge.  "

## 2023-12-24 NOTE — PLAN OF CARE
"  Problem: Adult Inpatient Plan of Care  Goal: Plan of Care Review  Description: The Plan of Care Review/Shift note should be completed every shift.  The Outcome Evaluation is a brief statement about your assessment that the patient is improving, declining, or no change.  This information will be displayed automatically on your shift  note.  12/24/2023 0521 by Jose Guido RN  Outcome: Progressing  12/23/2023 2252 by Jose Guido RN  Outcome: Progressing  Goal: Patient-Specific Goal (Individualized)  Description: You can add care plan individualizations to a care plan. Examples of Individualization might be:  \"Parent requests to be called daily at 9am for status\", \"I have a hard time hearing out of my right ear\", or \"Do not touch me to wake me up as it startles  me\".  12/24/2023 0521 by Jose Guido RN  Outcome: Progressing  12/23/2023 2252 by Jose Guido RN  Outcome: Progressing  Goal: Absence of Hospital-Acquired Illness or Injury  12/24/2023 0521 by Jose Guido RN  Outcome: Progressing  12/23/2023 2252 by Jose Guido RN  Outcome: Progressing  Intervention: Identify and Manage Fall Risk  Recent Flowsheet Documentation  Taken 12/24/2023 0400 by Jose Guido RN  Safety Promotion/Fall Prevention:   safety round/check completed   room organization consistent   room near nurse's station   room door open   patient and family education   nonskid shoes/slippers when out of bed   mobility aid in reach   lighting adjusted   increase visualization of patient   increased rounding and observation   clutter free environment maintained  Taken 12/24/2023 0000 by Jose Guido RN  Safety Promotion/Fall Prevention:   safety round/check completed   room organization consistent   room near nurse's station   room door open   patient and family education   nonskid shoes/slippers when out of bed   mobility aid in reach   lighting adjusted   " increase visualization of patient   increased rounding and observation   clutter free environment maintained  Taken 12/23/2023 2206 by Jose Guido RN  Safety Promotion/Fall Prevention:   safety round/check completed   room organization consistent   room near nurse's station   room door open   patient and family education   nonskid shoes/slippers when out of bed   mobility aid in reach   lighting adjusted   increase visualization of patient   increased rounding and observation   clutter free environment maintained  Intervention: Prevent Skin Injury  Recent Flowsheet Documentation  Taken 12/24/2023 0400 by Jose Guido RN  Body Position: position changed independently  Taken 12/24/2023 0000 by Jose Guido RN  Body Position: position changed independently  Taken 12/23/2023 2206 by Jose Guido RN  Body Position: position changed independently  Intervention: Prevent Infection  Recent Flowsheet Documentation  Taken 12/24/2023 0400 by Jose Guido RN  Infection Prevention:   rest/sleep promoted   personal protective equipment utilized   single patient room provided  Taken 12/24/2023 0000 by Jose Guido RN  Infection Prevention:   rest/sleep promoted   personal protective equipment utilized   single patient room provided  Taken 12/23/2023 2206 by Jose Guido RN  Infection Prevention:   rest/sleep promoted   personal protective equipment utilized   single patient room provided  Goal: Optimal Comfort and Wellbeing  12/24/2023 0521 by Jose Guido RN  Outcome: Progressing  12/23/2023 2252 by Jose Guido RN  Outcome: Progressing  Goal: Readiness for Transition of Care  12/24/2023 0521 by Jose Guido RN  Outcome: Progressing  12/23/2023 2252 by Jose Guido RN  Outcome: Progressing   Goal Outcome Evaluation:     VSS.  Telemetry arrhythmia's noted in previous note.  Up ad sonya independent.  Good UOP (see  I/O).   Awaiting Echo this AM.

## 2023-12-24 NOTE — PROGRESS NOTES
Observed New onset X2 episodes of tachycardia.  Rate trending 120-140's.  The first event occurring while pt asleep.  The second event occurring while pt voiding on toilet.  Pt endorsing light headedness.  After approximately 1-2 minutes, each event self terminated.  VSS.  Discussed aforementioned with House Resident (Sheri).    New order: PRN 12-Lead EKG if tachycardia occurs.    Continue to monitor pt as observation status.  Echo pending today.

## 2023-12-24 NOTE — H&P
[facility]    History and Physical - Hospitalist Service       Date of Admission:  12/23/2023    Assessment & Plan      Sherry King is a 63 year old female admitted on 12/23/2023. She has a history of varicose veins and is admitted for new onset chest tightness, dyspnea with exertion and notes occasional episodes of tachycardia, concerning for new onset of CHF, brief episodes of symptomatic tachycardia    #New onset acute CHF?  Patient endorses new onset chest pain 2 days prior (12/21) after exertion at her job with associated dyspnea, chills/sweats which spontaneously resolved with rest. No prior cardiac history or significant past medical history. Endorses new SOB w/exertion, although denies PND or orthopnea, no JVD or peripheral edema on exam. D-Dimer 12/23 2.76, CTPE 12/23 was negative for PE but demonstrated mild cardiomegaly w/prominence L ventricle and mild interstitial edema. BNP on admission found to be 2,726, BMP WNL, TSH 7.94 with T4 1.08. Troponin 29, 30 on repeat. Do have some suspicion of stable angina given history, although EKG findings 12/23/23 demonstrating sinus rhythm, ventricular rate 92, chronic finding of LBBB but otherwise reassuring.     -Echo AM  -Cardiac monitoring  -diureses - lasix 20 mg BID PO  -Recommend OP stress test    #Transient Tachycardia, symptomatic   Patient has had a couple episodes since admission of tachycardia to 150's for brief spells, lasting a few minutes with associated SOB and dizziness. Reviewed telemetry events, appear to be sinus tachycardia. No history of afib however I do have suspicion of paroxysmal afib given presentation and duration of symptoms. Will continue cardiac monitor overnight, if possible get EKG while she is tachycardic and symptomatic. Consider SVT vs Afib w/RVR however unclear on telemetry at this time.   -Cardiac monitoring  -Alert house if patient becomes tachycardic and symptomatic  -Encourage vagal  "maneuvers    #Subclinical Hypothyroidism  TSH 7.94, T4 free 1.08 wnl. Consider follow up at outpatient clinic in 3-6 months for TSH and T4 recheck or sooner if symptomatic.    Continued PTA supplements  Vit D  Biotin  Magnesium  Dental cream  Turmeric  Milk thistle        Diet: Combination Diet Regular Diet Adult    DVT Prophylaxis: Enoxaparin (Lovenox) SQ  Lombardo Catheter: Not present  Fluids: None, PO intake  Lines: None     Cardiac Monitoring: ACTIVE order. Indication: Chest pain/ ACS rule out (24 hours)  Code Status: Full Code      Clinically Significant Risk Factors Present on Admission                                  Disposition Plan      Expected Discharge Date: 12/24/2023                The patient's care was discussed with the Chief Resident/Fellow.      Je Wade MD  Hospitalist Service  [facility]  Securely message with [employer] (more info)  Text page via [employer] Paging/Directory   ______________________________________________________________________    Chief Complaint   Chest pain, SOB, chills/sweats w/exertion    History is obtained from the patient    History of Present Illness   Sherry King is a 63 year old female who presents with new onset chest pain, SOB, chills/sweating associated with exertion at her job 2 days ago. She states she has never had these symptoms previously, and had to sit and rest due to feeling \"light headed\" and felt her \"heart was racing\". She states that after resting symptoms resolved within a few minutes and she was able to return to work but has noticed that it has become more difficult to go up and down her stairs at home due to SOB. She denies pain and SOB currently, notes that she has had episodes of her \"heart racing\" in the past. She denies bowel or bladder symptoms, falls, syncope. States she has been sleeping well at night, does not routinely exercise but endorses decreased activity tolerance at work. No significant medical particularly " cardiac history noted. Patient currently takes only cyclobenzaprine as needed for upper back pain, but otherwise only takes supplements.     Patient works for [employer] as a corporate . She states that her job is fairly active. Lives with her sister, Cristel who accompanies her at today's visit. Denies tobacco or recreational drug use. Does endorse she drinks about 2 glasses of wine most nights.       Past Medical History    History reviewed. No pertinent past medical history.    Past Surgical History   History reviewed. No pertinent surgical history.    Prior to Admission Medications   Prior to Admission Medications   Prescriptions Last Dose Informant Patient Reported? Taking?   BIOTIN PO Past Week at prn  Yes Yes   Sig: Take 1 tablet by mouth daily as needed   Cholecalciferol (VITAMIN D-3 PO) Past Week at prn  Yes Yes   Sig: Take 1 tablet by mouth daily as needed   DENTA 5000 PLUS 1.1 % CREA 12/23/2023 at am  Yes Yes   Sig: daily   MAGNESIUM GLYCINATE PO Past Week at prn  Yes Yes   Sig: Take 1 tablet by mouth daily as needed   MILK THISTLE PO Past Week at prn  Yes Yes   Sig: Take 1 tablet by mouth daily as needed   Turmeric (QC TUMERIC COMPLEX PO) Past Week at prm  Yes Yes   Sig: Take 1 tablet by mouth daily as needed      Facility-Administered Medications: None         Physical Exam   Vital Signs: Temp: 98.2  F (36.8  C) Temp src: Oral BP: 119/72 Pulse: 86   Resp: 16 SpO2: 99 % O2 Device: None (Room air)    Weight: 135 lbs 4.8 oz    Physical Exam  Constitutional:       General: She is not in acute distress.     Appearance: Normal appearance. She is not diaphoretic.   HENT:      Mouth/Throat:      Mouth: Mucous membranes are moist.      Pharynx: Oropharynx is clear.   Eyes:      Extraocular Movements: Extraocular movements intact.      Conjunctiva/sclera: Conjunctivae normal.      Pupils: Pupils are equal, round, and reactive to light.   Cardiovascular:      Rate and Rhythm: Regular rhythm. Tachycardia present.       Pulses: Normal pulses.      Heart sounds: Normal heart sounds.   Pulmonary:      Effort: Pulmonary effort is normal.      Breath sounds: Normal breath sounds.   Abdominal:      General: Bowel sounds are normal.      Palpations: Abdomen is soft.   Musculoskeletal:         General: Normal range of motion.   Skin:     General: Skin is warm.      Capillary Refill: Capillary refill takes less than 2 seconds.   Neurological:      General: No focal deficit present.      Mental Status: She is alert and oriented to person, place, and time. Mental status is at baseline.   Psychiatric:         Mood and Affect: Mood normal.         Behavior: Behavior normal.         Thought Content: Thought content normal.         Judgment: Judgment normal.            Data     I have personally reviewed the following data over the past 24 hrs:    4.3  \   13.0   / 174     138 98 15.4 /  98   4.2 26 0.74 \     Trop: 30 (H) BNP: 2,726 (H)     TSH: 7.94 (H) T4: 1.08 A1C: N/A     INR:  N/A PTT:  N/A   D-dimer:  2.76 (H) Fibrinogen:  N/A       Imaging results reviewed over the past 24 hrs:   Recent Results (from the past 24 hour(s))   CT Chest Pulmonary Embolism w Contrast    Narrative    EXAM: CT CHEST PULMONARY EMBOLISM W CONTRAST  LOCATION: [facility]  DATE: 12/23/2023    INDICATION: chest pain,+ddimer  COMPARISON: None.  TECHNIQUE: CT chest pulmonary angiogram during arterial phase injection of IV contrast. Multiplanar reformats and MIP reconstructions were performed. Dose reduction techniques were used.   CONTRAST: isovue 370 90ml    FINDINGS:  ANGIOGRAM CHEST: Pulmonary arteries are normal caliber and negative for pulmonary emboli. Thoracic aorta is negative for dissection. No CT evidence of right heart strain.    LUNGS AND PLEURA: Mild atelectasis involving inferior lingula. Very mild smooth interseptal prominence at lung bases raises question of minimal interstitial edema. Mild pleural thickening at both  apices.    MEDIASTINUM/AXILLAE: Heart size upper limits of normal. Mild dilatation of left ventricle.    CORONARY ARTERY CALCIFICATION: None.    UPPER ABDOMEN: Normal.    MUSCULOSKELETAL: Normal.      Impression    IMPRESSION:  1.  No pulmonary emboli.  2.  Mild cardiomegaly with prominence of left ventricle and suggestion of mild interstitial edema.     Je Wade MD  PGY-1  [facility]  [facility]   December 23, 2023

## 2023-12-24 NOTE — ED NOTES
Patient had episode of HR into the 150s for approximately 7 or 8 minutes.  Associated shortness of breath.  No chest pain.  Patient back into NSR at regular rate.  Patient states that she no longer has shortness of breath.  Hospitalist notified.

## 2023-12-24 NOTE — CARE PLAN
PRIMARY DIAGNOSIS: CONGESTIVE HEART FAILURE  OUTPATIENT/OBSERVATION GOALS TO BE MET BEFORE DISCHARGE:  Dyspnea improved and O2 sats >88% at RA or at prior home O2 therapy level: Yes        SpO2: 100 %, O2 Device: None (Room air)  Vitals:    12/23/23 1709 12/23/23 2206   Weight: 62.1 kg (137 lb) 61.4 kg (135 lb 4.8 oz)        ECHO and other diagnostic testing complete (if applicable): Yes    Return to near baseline physical activity: No    Discharge Planner Nurse   Safe discharge environment identified: Yes  Barriers to discharge: Yes Patient continues to have unsustained tachycardia as high as 170. Every time RN gets to room patient no longer in Tachycardia will continue to monitor and give Adenosine if sustained long enough to get on monitor and give.          Entered by: Scarlet Singh RN 12/24/2023 1:06 PM     Please review provider order for any additional goals.   Nurse to notify provider when observation goals have been met and patient is ready for discharge.

## 2023-12-24 NOTE — PROGRESS NOTES
PRIMARY DIAGNOSIS: CHEST PAIN  OUTPATIENT/OBSERVATION GOALS TO BE MET BEFORE DISCHARGE:  1. Ruled out acute coronary syndrome (negative or stable Troponin):  No  2. Pain Status: Pain free.  3. Appropriate provocative testing performed: No  - Stress Test Procedure: Echo  - Interpretation of cardiac rhythm per telemetry tech: NSR, rate trending 80's.    4. Cleared by Consultants (if applicable):No  5. Return to near baseline physical activity: Yes  Discharge Planner Nurse   Safe discharge environment identified: No  Barriers to discharge: Yes       Entered by: Jose Guido RN 12/24/2023 12:43 AM     Please review provider order for any additional goals.   Nurse to notify provider when observation goals have been met and patient is ready for discharge.

## 2023-12-24 NOTE — CONSULTS
Care Management Initial Consult    General Information  Assessment completed with: Patient, Sherry  Type of CM/SW Visit: Initial Assessment    Primary Care Provider verified and updated as needed: No   Readmission within the last 30 days:        Reason for Consult: discharge planning  Advance Care Planning:          Communication Assessment  Patient's communication style: spoken language (English or Bilingual)        Cognitive  Cognitive/Neuro/Behavioral: WDL                      Living Environment:   People in home:       Current living Arrangements: house      Able to return to prior arrangements: yes       Family/Social Support:  Care provided by: self  Provides care for: no one            Description of Support System:         Current Resources:   Patient receiving home care services: No     Community Resources: None  Equipment currently used at home:    Supplies currently used at home: None    Employment/Financial:  Employment Status:          Financial Concerns: none   Referral to Financial Worker: No       Does the patient's insurance plan have a 3 day qualifying hospital stay waiver?  No    Lifestyle & Psychosocial Needs:  Social Determinants of Health     Food Insecurity: Not on file   Depression: Not on file   Housing Stability: Not on file   Tobacco Use: Low Risk  (12/23/2023)    Patient History     Smoking Tobacco Use: Never     Smokeless Tobacco Use: Never     Passive Exposure: Never   Financial Resource Strain: Not on file   Alcohol Use: Not on file   Transportation Needs: Not on file   Physical Activity: Not on file   Interpersonal Safety: Not on file   Stress: Not on file   Social Connections: Not on file     Functional Status:  Prior to admission patient needed assistance:   Dependent ADLs:: Independent  Dependent IADLs:: Independent  Assesssment of Functional Status: At functional baseline    Mental Health Status:  Mental Health Status: No Current Concerns       Chemical Dependency  Status:  Chemical Dependency Status: No Current Concerns           Values/Beliefs:  Spiritual, Cultural Beliefs, Roman Catholic Practices, Values that affect care: no             Additional Information:  Patient is independent with activities of daily living at baseline. Writer provided information on obtaining a [place] PCP if desired.  No care management needs identified at this time but CM will continue to monitor progression of care, review team recommendations and provide discharge planning assist as needed.      Cristina Woody RN

## 2023-12-24 NOTE — PLAN OF CARE
"  Problem: Adult Inpatient Plan of Care  Goal: Plan of Care Review  Description: The Plan of Care Review/Shift note should be completed every shift.  The Outcome Evaluation is a brief statement about your assessment that the patient is improving, declining, or no change.  This information will be displayed automatically on your shift  note.  Outcome: Progressing  Goal: Patient-Specific Goal (Individualized)  Description: You can add care plan individualizations to a care plan. Examples of Individualization might be:  \"Parent requests to be called daily at 9am for status\", \"I have a hard time hearing out of my right ear\", or \"Do not touch me to wake me up as it startles  me\".  Outcome: Progressing  Goal: Absence of Hospital-Acquired Illness or Injury  Outcome: Progressing  Goal: Optimal Comfort and Wellbeing  Outcome: Progressing  Goal: Readiness for Transition of Care  Outcome: Progressing   Goal Outcome Evaluation:    Admission: Observation.  Diagnosis:  Chest pain.  Accelerated heart rate.  Admitted from: [facility].  Via: stretcher.  Accompanied by: Self.  Belongings: Placed in closet; valuables on patients possession.  Admission paperwork: complete  Teaching: Call don't fall, use of console, meal times, visiting hours, orientation to unit, when to call for the RN (angina/sob/dizzyness, etc.), and the importance of safety.  Access: PIV  Telemetry:Placed on pt  Ht./Wt.: complete                        "

## 2023-12-24 NOTE — CONSULTS
Thank you, Dr. Chin ref. provider found, for asking the [facility] team to see Sherry King to evaluate tachycardia.      Assessment/Recommendations   Assessment:    Tachycardia, unclear mechanism possibly atrial flutter or reentry SVT  Left bundle branch block, newly diagnosed  Dilated cardiomyopathy with severely depressed LV systolic function, newly diagnosed, probably nonischemic based on absence of coronary calcium  Acute heart failure with reduced ejection fraction no overt fluid overload but mild pulmonary congestion on CT    Plan:  Start carvedilol 3.125 mg twice a day and spironolactone for cardiomyopathy  ARB or an ACE later on if blood pressure allows  Eliquis because of possible atrial flutter and need for ablation  Adenosine IV as needed to help with the etiology of tachycardia  Should undergo ablation as soon as early next week  Will need JARROD before ablation    May need CT coronary angiogram to confirm absence of coronary artery disease      Clinically Significant Risk Factors Present on Admission                   # Acute heart failure with reduced ejection fraction: last echo with EF <40% and receiving IV diuretics         # Financial/Environmental Concerns: none             History of Present Illness/Subjective    Ms. Sherry King is a 63 year old female who came in with complaints of shortness of breath and heart palpitations.  She states that she has not felt well for several days.  This last few days she been having more problems with elevated heart rate and a dyspnea.  She denies any weight gain.  She has not had PND orthopnea.  She has no history of any known heart disease.  She denies syncope or near syncope.  She has not had any recent viral syndrome.  She denies taking any new medications.  She does not abuse alcohol or methamphetamine.  She has not had an extensive cardiac testing.    ECG: Personally reviewed.  Normal sinus rhythm left bundle branch block.  Telemetry:  "Sinus rhythm with episodes of irregular tachycardia up to 140 bpm.  ECHO (personnaly Reviewed):   1. Left ventricular chamber size is mildly enlarged. Wall thickness is normal.  Systolic function is severely reduced with global hypokinesis and  intraventricular dyssynchrony due to left bundle branch block. The visually  estimated left ventricular ejection fraction is 20-25%.  2. Right ventricular chamber size and systolic function are normal.  3. No hemodynamically significant valvular abnormalities.  4. No prior study available for comparison.    CT chest: Cardiomegaly with mild vascular congestion no coronary calcium     Physical Examination Review of Systems   /61 (BP Location: Left arm)   Pulse 97   Temp 97.5  F (36.4  C) (Oral)   Resp 18   Ht 1.778 m (5' 10\")   Wt 61.4 kg (135 lb 4.8 oz)   SpO2 100%   BMI 19.41 kg/m    Body mass index is 19.41 kg/m .  Wt Readings from Last 3 Encounters:   12/23/23 61.4 kg (135 lb 4.8 oz)   12/23/23 62.1 kg (137 lb)   01/29/17 56 kg (123 lb 6.4 oz)       Intake/Output Summary (Last 24 hours) at 12/24/2023 1232  Last data filed at 12/24/2023 1000  Gross per 24 hour   Intake 1160 ml   Output 1650 ml   Net -490 ml     General Appearance:   Alert, cooperative, no distress, appears stated age   Head/ENT: Normocephalic, without obvious abnormality. Membranes moist.      EYES:  No scleral icterus   Neck: Supple, symmetrical, trachea midline, no adenopathy, thyroid: not enlarged, symmetric, no carotid bruit or JVD   Chest/Lungs:   lungs are clear to auscultation, respirations unlabored. No tenderness or deformity   Cardiovascular:   Regular rhythm, S1, S2 normal, no murmur, rub or gallop.   Abdomen:  Soft, non-tender, bowel sounds active all four quadrants,  no masses, no organomegaly   Extremities: no cyanosis or clubbing. No edema   Skin: Skin color, texture, turgor normal, no rashes or lesions.    Neurologic: Alert and oriented x 3, moving all four extremities.  " "  Psychiatric: Normal affect.      10 system review of systems completed see history of present illness and inpatient H&P (reviewed) for further details.          Lab Results    Chemistry/lipid CBC Cardiac Enzymes/BNP/TSH/INR   Recent Labs   Lab Test 12/24/23 0428   CHOL 216*      LDL 98   TRIG 76     Recent Labs   Lab Test 12/24/23 0428   LDL 98     Recent Labs   Lab Test 12/24/23 0428      POTASSIUM 3.8   CHLORIDE 99   CO2 27   GLC 80   BUN 16.1   CR 0.80   GFRESTIMATED 82   YO 9.4     Recent Labs   Lab Test 12/24/23 0428 12/23/23  1954 12/23/23  1757   CR 0.80 0.74 0.78     No results for input(s): \"A1C\" in the last 79593 hours.       Recent Labs   Lab Test 12/24/23 0428   WBC 4.4   HGB 12.8   HCT 38.7   MCV 98        Recent Labs   Lab Test 12/24/23 0428 12/23/23  1757   HGB 12.8 13.0    No results for input(s): \"TROPONINI\" in the last 15978 hours.  Recent Labs   Lab Test 12/23/23  1757   NTBNPI 2,726*     Recent Labs   Lab Test 12/23/23  1757   TSH 7.94*     No results for input(s): \"INR\" in the last 44247 hours.     Medical History  Surgical History Family History Social History   none  Varicose vein stripping Father had coronary artery disease at later stages of his life   Social History     Socioeconomic History     Marital status:      Spouse name: Not on file     Number of children: Not on file     Years of education: Not on file     Highest education level: Not on file   Occupational History     Not on file   Tobacco Use     Smoking status: Never     Passive exposure: Never     Smokeless tobacco: Never   Substance and Sexual Activity     Alcohol use: Not on file     Drug use: Not on file     Sexual activity: Not on file   Other Topics Concern     Not on file   Social History Narrative     Not on file     Social Determinants of Health     Financial Resource Strain: Not on file   Food Insecurity: Not on file   Transportation Needs: Not on file   Physical Activity: Not on " file   Stress: Not on file   Social Connections: Not on file   Interpersonal Safety: Not on file   Housing Stability: Not on file         Medications  Allergies   Current Facility-Administered Medications Ordered in Epic   Medication Dose Route Frequency Provider Last Rate Last Admin     acetaminophen (TYLENOL) tablet 650 mg  650 mg Oral Q4H PRN Je Wade MD        Or     acetaminophen (TYLENOL) Suppository 650 mg  650 mg Rectal Q4H PRN Je Wade MD         calcium carbonate (TUMS) chewable tablet 1,000 mg  1,000 mg Oral 4x Daily PRN Je Wade MD         enoxaparin ANTICOAGULANT (LOVENOX) injection 40 mg  40 mg Subcutaneous Q24H Je Wade MD   40 mg at 12/23/23 2242     [Held by provider] furosemide (LASIX) tablet 20 mg  20 mg Oral BID Je Wade MD         lidocaine (LMX4) cream   Topical Q1H PRN Je Wade MD         lidocaine 1 % 0.1-1 mL  0.1-1 mL Other Q1H PRN Je Wade MD         magnesium oxide (MAG-OX) half-tab 200 mg  200 mg Oral Daily Je Wade MD         melatonin tablet 5 mg  5 mg Oral At Bedtime PRN Je Wade MD         ondansetron (ZOFRAN ODT) ODT tab 4 mg  4 mg Oral Q6H PRN Je Wade MD        Or     ondansetron (ZOFRAN) injection 4 mg  4 mg Intravenous Q6H PRJe Gilliam MD         prochlorperazine (COMPAZINE) injection 10 mg  10 mg Intravenous Q6H PRN Je Wade MD        Or     prochlorperazine (COMPAZINE) tablet 10 mg  10 mg Oral Q6H PRJe Gilliam MD        Or     prochlorperazine (COMPAZINE) suppository 25 mg  25 mg Rectal Q12H PRN Je Wade MD         senkalia-docusate (SENOKOT-S/PERICOLACE) 8.6-50 MG per tablet 1 tablet  1 tablet Oral BID PRN Je Wade MD        Or     senna-docusate (SENOKOT-S/PERICOLACE) 8.6-50 MG per tablet 2 tablet  2 tablet Oral BID PRN Je Wade MD         sodium chloride (PF) 0.9% PF flush 3 mL  3 mL Intracatheter Q8H Je Wade MD   3 mL at 12/24/23 0629     sodium chloride  (PF) 0.9% PF flush 3 mL  3 mL Intracatheter q1 min prn Je Wade MD         Vitamin D3 (CHOLECALCIFEROL) tablet 25 mcg  25 mcg Oral Daily Je Wade MD         No current [facility]-ordered outpatient medications on file.     No Known Allergies

## 2023-12-24 NOTE — ED NOTES
"[facility] ED Handoff Report    ED Chief Complaint: chest tightness and shortness of breath    ED Diagnosis:  (R07.9) Chest pain, unspecified type  Comment: none   Plan: observation    (J81.0) Acute pulmonary edema (H)  Comment: none  Plan: lasix given    (R00.0) Racing heart beat  Comment: none  Plan: cardiac monitoring       PMH:  History reviewed. No pertinent past medical history.     Code Status:  No Order     Falls Risk: No Band: Not applicable    Current Living Situation/Residence: lives in a house     Elimination Status: Continent: Yes     Activity Level: Independent    Patients Preferred Language:  English     Needed: No    Vital Signs:  /75   Pulse 94   Temp 97.5  F (36.4  C) (Temporal)   Resp 20   Ht 1.778 m (5' 10\")   Wt 62.1 kg (137 lb)   SpO2 99%   BMI 19.66 kg/m       Cardiac Rhythm: NSR.  Tachycardia at times    Pain Score: 0/10    Is the Patient Confused:  No    Last Food or Drink: 12/23/23 at prior to arrival    Focused Assessment:  lungs clear.  Patient has intermittent shortness of breath     Tests Performed: Done: Labs and Imaging    Treatments Provided:  lasix, fluids    Family Dynamics/Concerns: No    Family Updated On Visitor Policy: Yes    Plan of Care Communicated to Family: Yes    Who Was Updated about Plan of Care:      Belongings Checklist Done and Signed by Patient: Yes    Medications sent with patient: none     Covid: asymptomatic , not done    Additional Information: none     RN: Kia Herndon RN  12/23/2023 8:16 PM      "

## 2023-12-24 NOTE — PROGRESS NOTES
PRIMARY DIAGNOSIS: CHEST PAIN  OUTPATIENT/OBSERVATION GOALS TO BE MET BEFORE DISCHARGE:  1. Ruled out acute coronary syndrome (negative or stable Troponin):  Yes  2. Pain Status: Pain free.  3. Appropriate provocative testing performed: No  - Stress Test Procedure: Echo  - Interpretation of cardiac rhythm per telemetry tech: NSR with BBB, rate 80's.  See note Re: Tachycardia.    4. Cleared by Consultants (if applicable):No  5. Return to near baseline physical activity: Yes  Discharge Planner Nurse   Safe discharge environment identified: No  Barriers to discharge: Yes       Entered by: Jose Guido RN 12/24/2023 5:19 AM     Please review provider order for any additional goals.   Nurse to notify provider when observation goals have been met and patient is ready for discharge.

## 2023-12-24 NOTE — DISCHARGE INSTRUCTIONS
Electrophysiology  Discharge instructions for Implantable Cardioverter Defibrillators (ICD)    For four weeks, with your defibrillator arm,  Do not:  Raise your arm above the height of your shoulder  Perform any vigorous arm movements  Swim, golf, wash windows, shovel snow or vacuum  Lift greater than 10-15 pounds    Check the implant site daily for the first week.  Contact the [facility] clinic [phone] should you experience any of the following:  Swelling  Redness  Drainage  Fever greater than 100.5 lasting more than 24 hours     You may shower in 3 days. If you have steri strips over your incision, leave them on.  They are glued on and will be removed at your follow up appointment.    It is not necessary to cover your incision with a dressing. Do not put any lotions or creams over the incision site.    To reduce the risk of infection, avoid dental procedures for the first 6 weeks.  Contact your cardiology clinic for an antibiotic should you need to see the dentist in the first 6 weeks post defibrillator implant.    You may travel by any mode of transportation; just show your defibrillator identification card.  Follow the recommendation by [facility] staff if you are traveling by air.    For future surgery or colonoscopy let your doctor know you have a defibrillator.    What to do if you get a shock from your defibrillator:  If you receive 1 shock, your symptoms subside and you feel ok, call the [facility] [phone].  If this occurs after hours call the next morning, providing you are feeling ok  If you are experiencing light-headedness, dizziness, shortness of breath, or feel like you may black out  call 9-1-1.    Most household appliances including a microwave, telephone, or cell phone will not interfere with your defibrillator function.  If you suspect interference, simply move away from the source.  Do not place a cell phone in a shirt pocket over your  defibrillator.    No driving for 3 days.    If the defibrillator site is uncomfortable you may place an ice pack (with a small dish cloth between skin and ice pack) over the site; alternate on 20 minutes - off 20 minutes.      Your Procedural Physician was: Dr. Lior Moreno   To reach the Electrophysiology Registered Nurses working with Dr Moreno please call [phone]     To reach the Device Registered Nurses regarding questions about your device incision or device function please call [phone] Option #3      [facility]:  [phone]  If you are calling after hours, please listen to the entire voicemail, a live  will answer at the end of the message.    Interventional Cardiology  Coronary Angiogram/Angioplasty/Stent/Atherectomy Discharge  Instructions -   Radial (wrist) Approach     The instructions below are to help you understand how to take care of yourself. There is also information about when to call the doctor or emergency services.    **Do not stop your aspirin or platelet inhibitor unless directed by your Cardiologist.  These medications help to prevent platelets in your blood from sticking together and forming a clot.   Examples of these medications are: Ticagrelor (Brilinta), Clopidogrel (Plavix), Prasugrel (Effient)    For 24 hours after procedure:  Do not subject hand/arm to any forceful movements for 24 hours, such as supporting weight when rising from a chair or bed.  Do not drive a car for 24 hours.  The dressing on the puncture site may be removed after 24 hours and left open to air. If minor oozing, you may apply a Band-Aid and remove after 12 hours.   You may shower on the day after your procedure. Do not take a tub bath or wash dishes (no soaking wrist) with the puncture site in water for 3 days after the procedure.    For 48 hours following the procedure:  Do not operate a lawnmower, motorcycle, chainsaw or all-terrain vehicle.  Do not lift anything  heavier than 5-10 pounds with affected arm for 5 days.  Avoid excessive bending (flexion/extension) wrist movement.  Do not engage in vigorous exercise (i.e. tennis, golf) using the affected arm for 5 days after discharge.  You may return to work in 72 hours if no complications and no heavy lifting.    If bleeding should occur following discharge:  Sit down and apply firm pressure with your thumb against the puncture site and fingers against back of wrist for 10 minutes.  If the bleeding stops, continue to rest, keeping your wrist still for 2 hours. Notify your doctor as soon as possible.  If bleeding does not stop after 10 minutes or if there is a large amount of bleeding or spurting, call 911 immediately. Do not drive yourself to the hospital.           Contact the Heart Clinic at [phone] if you develop:  Fever over 100.4, that lasts more than one day  Redness, heat, or pus at the puncture site  Change in color or temperature of the hand or arm    Expect mild tingling of hand and tenderness at the puncture site for up to 3 days. You may take Tylenol or a pain medicine recommended by your doctor.                       Our Cardiac Rehab staff may visit briefly with you while your in the hospital.   If they miss you, someone will contact you after you are home.  You are encouraged to enroll in an Outpatient Cardiac Rehab Program     No elective dental work for 6 weeks after having a stent    Your Procedural Physician was: {CV Physicians:[id number]}  the phone number is: [phone]    [facility]:  [phone]  If you are calling after hours, please listen to the entire voicemail, a live  will answer at the end of the message

## 2023-12-25 LAB
ANION GAP SERPL CALCULATED.3IONS-SCNC: 10 MMOL/L (ref 7–15)
ANION GAP SERPL CALCULATED.3IONS-SCNC: 18 MMOL/L (ref 7–15)
ATRIAL RATE - MUSE: 88 BPM
BUN SERPL-MCNC: 12.4 MG/DL (ref 8–23)
BUN SERPL-MCNC: 15.2 MG/DL (ref 8–23)
CALCIUM SERPL-MCNC: 9.2 MG/DL (ref 8.8–10.2)
CALCIUM SERPL-MCNC: 9.2 MG/DL (ref 8.8–10.2)
CALCIUM, IONIZED MEASURED: 1.19 MMOL/L (ref 1.11–1.3)
CHLORIDE SERPL-SCNC: 99 MMOL/L (ref 98–107)
CHLORIDE SERPL-SCNC: 99 MMOL/L (ref 98–107)
CREAT SERPL-MCNC: 0.66 MG/DL (ref 0.51–0.95)
CREAT SERPL-MCNC: 0.69 MG/DL (ref 0.51–0.95)
CRP SERPL-MCNC: <3 MG/L
DEPRECATED HCO3 PLAS-SCNC: 19 MMOL/L (ref 22–29)
DEPRECATED HCO3 PLAS-SCNC: 26 MMOL/L (ref 22–29)
DIASTOLIC BLOOD PRESSURE - MUSE: NORMAL MMHG
EGFRCR SERPLBLD CKD-EPI 2021: >90 ML/MIN/1.73M2
EGFRCR SERPLBLD CKD-EPI 2021: >90 ML/MIN/1.73M2
ERYTHROCYTE [DISTWIDTH] IN BLOOD BY AUTOMATED COUNT: 13.2 % (ref 10–15)
GLUCOSE BLDC GLUCOMTR-MCNC: 87 MG/DL (ref 70–99)
GLUCOSE SERPL-MCNC: 139 MG/DL (ref 70–99)
GLUCOSE SERPL-MCNC: 215 MG/DL (ref 70–99)
HBA1C MFR BLD: 5.1 %
HCT VFR BLD AUTO: 42.1 % (ref 35–47)
HGB BLD-MCNC: 14.1 G/DL (ref 11.7–15.7)
HOLD SPECIMEN: NORMAL
HOLD SPECIMEN: NORMAL
INTERPRETATION ECG - MUSE: NORMAL
ION CA PH 7.4: 1.15 MMOL/L (ref 1.11–1.3)
LACTATE SERPL-SCNC: 0.5 MMOL/L (ref 0.7–2)
LACTATE SERPL-SCNC: 4 MMOL/L (ref 0.7–2)
MAGNESIUM SERPL-MCNC: 1.9 MG/DL (ref 1.7–2.3)
MAGNESIUM SERPL-MCNC: 3.7 MG/DL (ref 1.7–2.3)
MCH RBC QN AUTO: 32.9 PG (ref 26.5–33)
MCHC RBC AUTO-ENTMCNC: 33.5 G/DL (ref 31.5–36.5)
MCV RBC AUTO: 98 FL (ref 78–100)
P AXIS - MUSE: 15 DEGREES
PH: 7.34 (ref 7.35–7.45)
PHOSPHATE SERPL-MCNC: 3.6 MG/DL (ref 2.5–4.5)
PHOSPHATE SERPL-MCNC: 4.7 MG/DL (ref 2.5–4.5)
PLATELET # BLD AUTO: 193 10E3/UL (ref 150–450)
POTASSIUM SERPL-SCNC: 3.5 MMOL/L (ref 3.4–5.3)
POTASSIUM SERPL-SCNC: 4 MMOL/L (ref 3.4–5.3)
PR INTERVAL - MUSE: 200 MS
QRS DURATION - MUSE: 136 MS
QT - MUSE: 466 MS
QTC - MUSE: 563 MS
R AXIS - MUSE: -52 DEGREES
RBC # BLD AUTO: 4.28 10E6/UL (ref 3.8–5.2)
SODIUM SERPL-SCNC: 135 MMOL/L (ref 135–145)
SODIUM SERPL-SCNC: 136 MMOL/L (ref 135–145)
SYSTOLIC BLOOD PRESSURE - MUSE: NORMAL MMHG
T AXIS - MUSE: 204 DEGREES
TROPONIN T SERPL HS-MCNC: 19 NG/L
VENTRICULAR RATE- MUSE: 88 BPM
WBC # BLD AUTO: 6.4 10E3/UL (ref 4–11)

## 2023-12-25 PROCEDURE — 83735 ASSAY OF MAGNESIUM: CPT

## 2023-12-25 PROCEDURE — 93010 ELECTROCARDIOGRAM REPORT: CPT | Mod: 77 | Performed by: INTERNAL MEDICINE

## 2023-12-25 PROCEDURE — 82330 ASSAY OF CALCIUM: CPT | Performed by: INTERNAL MEDICINE

## 2023-12-25 PROCEDURE — 86140 C-REACTIVE PROTEIN: CPT | Performed by: INTERNAL MEDICINE

## 2023-12-25 PROCEDURE — 99291 CRITICAL CARE FIRST HOUR: CPT | Performed by: INTERNAL MEDICINE

## 2023-12-25 PROCEDURE — 250N000013 HC RX MED GY IP 250 OP 250 PS 637: Performed by: INTERNAL MEDICINE

## 2023-12-25 PROCEDURE — 93005 ELECTROCARDIOGRAM TRACING: CPT

## 2023-12-25 PROCEDURE — 250N000013 HC RX MED GY IP 250 OP 250 PS 637

## 2023-12-25 PROCEDURE — 83735 ASSAY OF MAGNESIUM: CPT | Performed by: INTERNAL MEDICINE

## 2023-12-25 PROCEDURE — 93010 ELECTROCARDIOGRAM REPORT: CPT | Performed by: INTERNAL MEDICINE

## 2023-12-25 PROCEDURE — 83605 ASSAY OF LACTIC ACID: CPT | Performed by: STUDENT IN AN ORGANIZED HEALTH CARE EDUCATION/TRAINING PROGRAM

## 2023-12-25 PROCEDURE — 36569 INSJ PICC 5 YR+ W/O IMAGING: CPT

## 2023-12-25 PROCEDURE — 250N000011 HC RX IP 250 OP 636: Mod: JZ

## 2023-12-25 PROCEDURE — 250N000011 HC RX IP 250 OP 636: Performed by: INTERNAL MEDICINE

## 2023-12-25 PROCEDURE — 272N000452 HC KIT SHRLOCK 5FR POWER PICC TRIPLE LUMEN

## 2023-12-25 PROCEDURE — G0378 HOSPITAL OBSERVATION PER HR: HCPCS

## 2023-12-25 PROCEDURE — 5A2204Z RESTORATION OF CARDIAC RHYTHM, SINGLE: ICD-10-PCS

## 2023-12-25 PROCEDURE — 250N000011 HC RX IP 250 OP 636

## 2023-12-25 PROCEDURE — 84100 ASSAY OF PHOSPHORUS: CPT

## 2023-12-25 PROCEDURE — C9113 INJ PANTOPRAZOLE SODIUM, VIA: HCPCS | Performed by: INTERNAL MEDICINE

## 2023-12-25 PROCEDURE — 84484 ASSAY OF TROPONIN QUANT: CPT | Performed by: INTERNAL MEDICINE

## 2023-12-25 PROCEDURE — 250N000009 HC RX 250: Performed by: INTERNAL MEDICINE

## 2023-12-25 PROCEDURE — 80048 BASIC METABOLIC PNL TOTAL CA: CPT | Performed by: INTERNAL MEDICINE

## 2023-12-25 PROCEDURE — 84100 ASSAY OF PHOSPHORUS: CPT | Performed by: INTERNAL MEDICINE

## 2023-12-25 PROCEDURE — 99233 SBSQ HOSP IP/OBS HIGH 50: CPT | Mod: 25 | Performed by: INTERNAL MEDICINE

## 2023-12-25 PROCEDURE — 210N000001 HC R&B IMCU HEART CARE

## 2023-12-25 PROCEDURE — 83036 HEMOGLOBIN GLYCOSYLATED A1C: CPT | Performed by: INTERNAL MEDICINE

## 2023-12-25 PROCEDURE — 83605 ASSAY OF LACTIC ACID: CPT | Performed by: INTERNAL MEDICINE

## 2023-12-25 PROCEDURE — 85027 COMPLETE CBC AUTOMATED: CPT

## 2023-12-25 PROCEDURE — 36415 COLL VENOUS BLD VENIPUNCTURE: CPT | Performed by: INTERNAL MEDICINE

## 2023-12-25 PROCEDURE — 99232 SBSQ HOSP IP/OBS MODERATE 35: CPT | Mod: GC

## 2023-12-25 PROCEDURE — 82962 GLUCOSE BLOOD TEST: CPT

## 2023-12-25 PROCEDURE — 80048 BASIC METABOLIC PNL TOTAL CA: CPT

## 2023-12-25 PROCEDURE — 250N000013 HC RX MED GY IP 250 OP 250 PS 637: Performed by: STUDENT IN AN ORGANIZED HEALTH CARE EDUCATION/TRAINING PROGRAM

## 2023-12-25 RX ORDER — CARVEDILOL 3.12 MG/1
3.12 TABLET ORAL ONCE
Status: COMPLETED | OUTPATIENT
Start: 2023-12-25 | End: 2023-12-25

## 2023-12-25 RX ORDER — NAPROXEN 500 MG/1
500 TABLET ORAL ONCE
Qty: 1 TABLET | Refills: 0 | Status: COMPLETED | OUTPATIENT
Start: 2023-12-25 | End: 2023-12-25

## 2023-12-25 RX ORDER — LIDOCAINE 4 G/G
2 PATCH TOPICAL
Status: DISCONTINUED | OUTPATIENT
Start: 2023-12-25 | End: 2023-12-29 | Stop reason: HOSPADM

## 2023-12-25 RX ORDER — CARVEDILOL 3.12 MG/1
6.25 TABLET ORAL 2 TIMES DAILY WITH MEALS
Status: DISCONTINUED | OUTPATIENT
Start: 2023-12-25 | End: 2023-12-26

## 2023-12-25 RX ORDER — POTASSIUM CHLORIDE 7.45 MG/ML
10 INJECTION INTRAVENOUS
Qty: 200 ML | Refills: 0 | Status: COMPLETED | OUTPATIENT
Start: 2023-12-25 | End: 2023-12-25

## 2023-12-25 RX ORDER — FENTANYL CITRATE 50 UG/ML
INJECTION, SOLUTION INTRAMUSCULAR; INTRAVENOUS
Status: DISCONTINUED
Start: 2023-12-25 | End: 2023-12-25 | Stop reason: HOSPADM

## 2023-12-25 RX ORDER — LIDOCAINE 40 MG/G
CREAM TOPICAL
Status: ACTIVE | OUTPATIENT
Start: 2023-12-25 | End: 2023-12-28

## 2023-12-25 RX ADMIN — POTASSIUM CHLORIDE 10 MEQ: 7.46 INJECTION, SOLUTION INTRAVENOUS at 06:38

## 2023-12-25 RX ADMIN — Medication 25 MCG: at 09:08

## 2023-12-25 RX ADMIN — POTASSIUM CHLORIDE 10 MEQ: 7.46 INJECTION, SOLUTION INTRAVENOUS at 05:33

## 2023-12-25 RX ADMIN — ACETAMINOPHEN 650 MG: 325 TABLET ORAL at 09:08

## 2023-12-25 RX ADMIN — CARVEDILOL 3.12 MG: 3.12 TABLET, FILM COATED ORAL at 09:08

## 2023-12-25 RX ADMIN — NAPROXEN 500 MG: 500 TABLET ORAL at 15:48

## 2023-12-25 RX ADMIN — LIDOCAINE HYDROCHLORIDE 2 ML: 10 INJECTION, SOLUTION EPIDURAL; INFILTRATION; INTRACAUDAL; PERINEURAL at 08:49

## 2023-12-25 RX ADMIN — ACETAMINOPHEN 650 MG: 325 TABLET ORAL at 18:42

## 2023-12-25 RX ADMIN — MAGNESIUM OXIDE TAB 400 MG (241.3 MG ELEMENTAL MG) 200 MG: 400 (241.3 MG) TAB at 10:08

## 2023-12-25 RX ADMIN — PANTOPRAZOLE SODIUM 40 MG: 40 INJECTION, POWDER, FOR SOLUTION INTRAVENOUS at 09:09

## 2023-12-25 RX ADMIN — ACETAMINOPHEN 650 MG: 325 TABLET ORAL at 05:21

## 2023-12-25 RX ADMIN — LIDOCAINE 2 PATCH: 4 PATCH TOPICAL at 09:04

## 2023-12-25 RX ADMIN — ENOXAPARIN SODIUM 40 MG: 40 INJECTION SUBCUTANEOUS at 21:01

## 2023-12-25 RX ADMIN — SPIRONOLACTONE 25 MG: 25 TABLET ORAL at 09:10

## 2023-12-25 RX ADMIN — FENTANYL CITRATE: 50 INJECTION INTRAMUSCULAR; INTRAVENOUS at 04:00

## 2023-12-25 RX ADMIN — ACETAMINOPHEN 650 MG: 325 TABLET ORAL at 14:39

## 2023-12-25 RX ADMIN — CARVEDILOL 3.12 MG: 3.12 TABLET, FILM COATED ORAL at 05:20

## 2023-12-25 ASSESSMENT — ACTIVITIES OF DAILY LIVING (ADL)
ADLS_ACUITY_SCORE: 20

## 2023-12-25 NOTE — PLAN OF CARE
Problem: Dysrhythmia  Goal: Normalized Cardiac Rhythm  Outcome: Progressing     Pt remains in SR. Pads on chest with crash cart outside the room. Pt AST 1, transferred from ICU at 1500. BP low, pt with increased pain after tylenol administration. MD updated and ordered one time dose of naproxen.

## 2023-12-25 NOTE — PROCEDURES
"PICC Line Insertion Procedure Note  Pt. Name: Sherry King  MRN:        [id number]    Procedure: Insertion of a  triple Lumen  5 fr  Bard SOLO (valved) Power PICC, Lot number [id number]    Indications: Vascular access    Contraindications : none    Procedure Details     Patient identified with 2 identifiers and \"Time Out\" conducted.  .     Central line insertion bundle followed: hand hygiene performed prior to procedure, site cleansed with cholraprep, hat, mask, sterile gloves, sterile gown worn, patient draped with maximum barrier head to toe drape, sterile field maintained.    The vein was assessed and found to be compressible and of adequate size.     Lidocaine 1% 2 ml administered sq to the insertion site. A 5 Fr PICC was inserted into the basilic vein of the right arm with ultrasound guidance. 1 attempt(s) required to access vein.   Catheter threaded without difficulty. Good blood return noted.    Modified Seldinger Technique used for insertion.    The 8 sharps that are included in the PICC insertion kit were accounted for and disposed of in the sharps container prior to breakdown of the sterile field.    Catheter secured with Statlock, biopatch and Tegaderm dressing applied.    Findings:    Total catheter length  40 cm, with 0 cm exposed. Mid upper arm circumference is 25 cm. Catheter was flushed with 30 cc NS. Patient  tolerated procedure well.    Tip placement verified by 3CG technology . Tip placement in the SVC/RA junction.    CLABSI prevention brochure left at bedside.    Patient's primary RN notified PICC is ready for use.      Comments:        Radha CARTWRIGHTN,RN,VA-BC  Vascular Access - [facility]      "

## 2023-12-25 NOTE — CODE/RAPID RESPONSE
Code blue called @ 314  Per Staff Rns, patient monitor appeared torsades so staff went to check and noticed patient unresponsive with agonal breathing.     Code team arrived @  315 with compressions in progress.   Shortly after arrival, patient moaned and sat up. But appeared to be going in/out of consciousness. When CPR stopped when patient sitting up, monitor showing vtach going to torsades.    CPR resumed each time patient lost consciousness / pulse.    @ 0320- 2G IV magnesium push given.  @ 323 patient defib shocked with 200j - CPR resumed. IO placed in Right leg.  0324- Patient sat up and began talking.   HR 90', Spo2 100% on open flow into non-rebreather  bP 138/74.  EKG performed and labs drawn.   0331- 12.5 mcg IVP fentanyl given to patient for complaints of pain post compressions per ICU MD.   Patient transferred to ICU with code team with KATE hooked up and ready, defib pads on and monitor hooked up. Upon arrival to ICU patient alert and oriented, O2 titrated down to nasal cannula. Family being updated.

## 2023-12-25 NOTE — PROGRESS NOTES
[facility]  ICU Progress Note    Summary:   Sherry King 62 yo F PMH anxiety disorder    Presented 12/23/23 for chest discomfort, dyspnea on exertion, & palpitations. Found to have new diagnosis of left bundle branch block, atrial flutter vs reentry SVT, & dilated cardiomyopathy with HFrEF. Post-admission she exhibited polymorphic & monomorphic ventricular tachycardia treated with chest compressions & defibrillation with sustained ROSC.     Interval Events:  Awake & alert. Chest discomfort & back pain. Normotensive. NSR. Lactic acidosis resolved.     Cardiac MRI & CT coronary angiogram per cardiology. Carvedilol was increased. Stop prn antiemetics & avoid QT prolonging drugs.     Assessment & Plan:   Goals of Care:  Life prolonging without limits      Neurology, Psychiatry, Sedation, Analgesia:  Neurologically intact post-cardiac arrest     Cardiovascular:  New diagnosis of cardiomyopathy & HFrEF  Presumed non-ischemic given absence of coronary calcification. 12/24/23 TTE demonstrated mildly enlarged LV chamber, normal wall thickness, severely reduced systolic function with global hypokinesis and intraventricular dyssynchrony due to LBBB, LVEF 20-25%, no thrombus visualized in the LV, RV chamber size 7 systolic function are normal, no valvular abnormalities   - cardiology was consulted   - coronary CT & cardiac MRI per cardiology   - carvedilol 3.125 mg bid   - spironolactone   - hold ARB/ACE-I    Polymorphic & monomorphic ventricular tachycardia  Initiated by R on T phenomena   - EP consulted, needs CRT-D prior to discharge   - K>4, Mg>2  - cMRI per cardiology     Atrial flutter vs reentry SVT  - needs JARROD & ablation this admission     Left bundle branch block    Prolonged QT    Respiratory, Airway:  Breathing comfortably on room air   12/23/23 CT chest demonstrated apical pleural thickening & mild interstitial edema     Gastrointestinal:  No active issues     Renal, Acid-base, Electrolytes,  Volume:  Lactic acidosis - resolved     Normal renal indices     Infectious Disease:  Low suspicion for infection      Hematology, Oncology:  Normal complete blood count     Endocrine:  12/25/23 A1c 5.1     Checklist:  FEN: regular diet, 2g Na, 2 L restriction   VTE ppx: enoxaparin   GI ppx: not indicated    Bowel regimen: prn senna/doc   Lines/tube-size: RUE PICC  Skin: no lesions    PT/OT/SLP, early mobility: not consulted   Code Status: full     Physical Exam:  Neurologic: Alert & oriented. 5/5 strength throughout.   HEENT: Head and face normal. No nasal discharge. Oropharynx normal. Eyelids, conjunctiva, & sclera normal.   Neck: Neck appearance normal. No neck masses.  Respiratory: Lungs clear bilaterally. No wheezes, crackles, or rhonchi.   Cardiovascular: Regular rate & rhythm  Normal S1 & S2. No murmurs, rubs. or gallops. No elevated JVP.    Gastrointestinal: Bowel sounds present. No tenderness, masses, or organomegaly.     Musculoskeletal: Skeletal configuration normal and muscle mass normal for age. Joint appearance overall normal.  Skin, Hair, & Nails: Skin color normal. No skin lesions.  Hair & nails normal.  Extremities: No peripheral edema.     All pertinent vital signs, ventilator settings, I&Os, laboratory, microbiology, ECGs, & imaging data has been personally reviewed.     YANNA Bailey MD  Critical Care Medicine

## 2023-12-25 NOTE — PLAN OF CARE
[facility] - ICU    RN Progress Note:            Pertinent Assessments:      Please refer to flowsheet rows for full assessment     NA           Key Events - This Shift:       Pt arrived from P3 after a code involving Torsades. Pt awake and able to answer questions. Pt then developed SVT in the 130s and mild hypotension. Intensivist notified. Placed pt with head down in trendelenburg and attached defib pads in case pt developed Torsades again. Converted back to sinus ~0555.                   Barriers to Discharge / Downgrade:     Unstable heart rhythm         Point of Contact Update YES-OR-NO: Yes  If No, reason:   Name: Daughter and sister  Phone Number:  Summary of Conversation: At bedside

## 2023-12-25 NOTE — SIGNIFICANT EVENT
Significant Event Note    Time of event: 3:15 AM December 25, 2023    Description of event:  Code blue called. Per RN patient went into abnormal rhythm, so nurse went to check on her and noticed patient was unresponsive.    Dr. Chase and Hank starkey team arrived and started CPR. Patient seemed to be going in out of consciousness and moaning intermittently.    Monitor showed torsades going into Vtach. CPR continued as patient was in and out consciousness and losing pulse.  One push of IV mag given followed by shock 200j, CPR resumed, IO line placed right leg. Within one minute patient tried to sit up and was alert and c/o of leg pain. /74. HR 80s EKG and labs including troponin, BMP, CBC, Mag and phosphorus were drawn. Patient transferred to the ICU.     Patient is alert and oriented in the ICU.   Dr. Chase intensivist spoke with cardiology and updated family.    Plan to start isoproterenol at 2mg/hr if she develops torsades again.    Alberto Ayala MD    House officer

## 2023-12-25 NOTE — PROGRESS NOTES
146am to 150am patient  heart rate was 120-130's then went back to 80-90's. Patient was up to the bathroom.   Patient went into V-tach and was found in the room and unresponsive with no pulse. Code blue was called and CPR initiated. Sera Conklin RN   0340 belongings was brought to ICU room by P3 charge nurse. Phone, clothes, shoes, and tote bag/purse. Sera Conklin RN

## 2023-12-25 NOTE — PROGRESS NOTES
[facility]    Progress Note - Hospitalist Service       Date of Admission:  12/23/2023    Assessment & Plan   Sherry King is a 63 year old female admitted on 12/23/2023. She has a history of varicose veins and is admitted for new onset chest tightness, dyspnea with exertion and notes occasional episodes of tachycardia, concerning for new onset of CHF, brief episodes of symptomatic tachycardia. Found to be unresponsive with torsades overnight in early AM 12/25, requiring resuscitation. Transferred to ICU, intensivist now primary. Patient now stable, plan for further cardiac workup and CRT-D before discharge.        HFrEF exacerbation - new diagnosis  LBBB - new diagnosis  Dilated cardiomyopathy  Patient endorses new onset chest pain 2 days prior (12/21) after exertion at her job with associated dyspnea, chills/sweats which spontaneously resolved with rest. No prior cardiac history or significant past medical history. Endorses new SOB w/exertion, although denies PND or orthopnea, no JVD or peripheral edema on exam. D-Dimer 12/23 2.76, CTPE 12/23 was negative for PE but demonstrated mild cardiomegaly w/prominence L ventricle and mild interstitial edema. BNP on admission found to be 2700, BMP WNL, TSH 7.94 with T4 1.08. Troponin 29, 30 on repeat. A1c 5.1, lipids unremarkable. EKG findings 12/23 demonstrating sinus rhythm, ventricular rate 92, LBBB but otherwise reassuring. Received IV diuresis overnight, though fluid status unremarkable on exam. Echo significant for severely reduced systolic function with global hypokinesis and intraventricular dyssynchrony d/t LBBB, EF 20-25%. Patient notes family history of CAD. Probably nonischemic based on absence of coronary calcium. Viral myocarditis unlikely with normal CRP but need to rule out.  - cardiology following   - cardiac MR   - angio   - carvedilol increased to 6.25 BID, continue spironolactone 25 mg daily   - ARB/ACE once hypotension  resolved   - discussed with EP: CRT-D before discharge  - telemetry     Transient atrial tachycardia, symptomatic   Torsades s/p CPR w/ asynchronized cardioversion x1 12/25  Patient has had a couple episodes since admission of tachycardia to 150's for brief spells, lasting a few minutes with associated SOB and dizziness. Initially in NSR on tele strips, but had then continued to have several episodes of symptomatic tachycardia. Cards kept patient for planned ablation, given IV adenosine. Patient found unconscious overnight with sustained torsades requiring resuscitation. Given 2 mg Mg with little change in rhythm. Achieved ROSC after about 15 min of off and on chest compressions with one 200J asynchronized shock. Later required Trendelenburg for SVT causing drop in BP. Patient now in ICU with intensivist managing.   - ICU now primary  - cardiology following with above plan   - will need to start DOAC   - NO antiarrhythmics given prolonged QT  - telemetry     #Subclinical Hypothyroidism  TSH 7.94, T4 free 1.08 wnl.  - recheck in outpatient           Diet: Combination Diet Regular Diet Adult; 2 gm NA Diet  Fluid restriction 2000 ML FLUID    DVT Prophylaxis: Enoxaparin (Lovenox) SQ  Lombardo Catheter: Not present  Fluids: PO  Lines: PRESENT      PICC 12/25/23 Triple Lumen Right Basilic Vascular access-Site Assessment: WDL    Cardiac Monitoring: ACTIVE order. Indication: Chest pain/ ACS rule out (24 hours)  Code Status: Full Code      Clinically Significant Risk Factors Present on Admission                   # Acute heart failure with reduced ejection fraction: last echo with EF <40% and receiving IV diuretics         # Financial/Environmental Concerns: none         Disposition Plan      Expected Discharge Date: 12/27/2023                The patient's care was discussed with the Attending Physician, Dr. Rosenstein .    Vitaly Loyola MD  Hospitalist Service  [facility]  Securely message with [employer]  (more info)  Text page via [facility] Paging/Directory   ______________________________________________________________________    Interval History   Patient kept overnight for planned ablation per cards.   Found unresponsive early this AM in torsades, requiring resuscitation.   Transferred to ICU.   Patient actually doing quite well, minor pain from chest compressions.     Physical Exam   Vital Signs: Temp: 98  F (36.7  C) Temp src: Axillary BP: 92/61 Pulse: 74   Resp: 17 SpO2: 98 % O2 Device: None (Room air)    Weight: 133 lbs 9.6 oz    General Appearance: Sitting up in bed, pleasant, appears comfortable, alert, NAD.  Respiratory: No increased work of breathing. CTAB- no wheezing, crackles, rhonchi.   Cardiovascular: RRR, no murmurs. No peripheral edema.   GI: Soft, nondistended, nontender.   Skin: Dry, warm.   Other: Mildly anxious.      Medical Decision Making       Please see A&P for additional details of medical decision making.      Data   ------------------------- PAST 24 HR DATA REVIEWED -----------------------------------------------

## 2023-12-25 NOTE — PLAN OF CARE
"Goal Outcome Evaluation:      Plan of Care Reviewed With: patient    Overall Patient Progress: no changeOverall Patient Progress: no change       Vitals:    12/24/23 1530 12/24/23 1545 12/24/23 1600 12/24/23 1917   BP: 135/61 111/78 108/76 106/60   BP Location:    Left arm   Patient Position:    Semi-Young's   Cuff Size:    Adult Regular   Pulse: 115 (!) 139 (!) 141 83   Resp:    20   Temp:    98.3  F (36.8  C)   TempSrc:    Oral   SpO2:    95%   Weight:       Height:        PRIMARY DIAGNOSIS: \"GENERIC\" NURSING  OUTPATIENT/OBSERVATION GOALS TO BE MET BEFORE DISCHARGE:  ADLs back to baseline: Yes    Activity and level of assistance: Ambulating independently.    Pain status: Pain free.    Return to near baseline physical activity: Yes     Discharge Planner Nurse   Safe discharge environment identified: Yes  Barriers to discharge: No       Entered by: Sera Conklin RN 12/24/2023 9:55 PM     Please review provider order for any additional goals.   Nurse to notify provider when observation goals have been met and patient is ready for discharge.  Heart rate has stayed in the 80-90's. No episodes even with going to the bathroom. Cards still seeing patient tomorrow for pain. Sera Conklin RN     2230 At 2208 patient was up to the bathroom and had a run of trigeminy PVC's. Notified house officer. No further orders at this time just monitor for now. Sera Conklin RN   "

## 2023-12-25 NOTE — SIGNIFICANT EVENT
Significant Event Note    Time of event: 1:57 PM December 25, 2023      Paged by intensivist team for stepdown from ICU  Patient is admitted for HFrEF exacerbation complicated by new Tors[place] s/p CPR with ROSC and asynchronized cardioversion 12/25    Patient now stable, cares currently not requiring ICU level cares  Will continue to keep pads on in case of worsening rhythm, however patient has remained stable, cardiology following  Plan to transfer to floor with crash cart nearby and continue to maintain pads on for now, continue telemetry      Please see progress notes written by Dr. Bailey and Dr. Loyola from today.   Our team will formally round on patient in AM.        Kia Spann MD

## 2023-12-25 NOTE — CONSULTS
CRITICAL CARE CONSULT:    12/23/2023  5:11 PM    CCx:Torsades, VT Arrest    HPI:Ms. King is a 63 year old lady with a past medical history significant for KEENA, prediabetes and varicose veins who was admitted to the hospital on 12/23 for new onset of chest tightness, dyspnea with exertion and episodes of tachycardia.  In reviewing her chart it is apparent that she had been feeling lightheaded for couple days prior to presentation and that the symptoms resolved with rest.  She began to notice dyspnea on exertion and decreased exercise tolerance.  At the time of admission D-dimer was noted to be elevated, CT PE study demonstrated cardiomegaly, BNP was noted to be elevated to 2700 with an elevated troponin.  She was also noted to have left bundle branch block.  She subsequently underwent an echocardiogram which demonstrated a very enlarged LV chamber size, severe reduction in systolic function with global hypokinesis and interventricular dyssynchrony due to left bundle branch block.  Her ejection fraction was estimated to be 20-25%.  Cardiology she was initiated on carvedilol and spironolactone just yesterday in addition to Eliquis.  At the time of my presentation to her room, the patient had been noted to have an abnormal rhythm and was in torsades at the time of my arrival (please refer to code note for details).  She was administered 2 mg of magnesium and was undergoing chest compressions due to achieving ROSC on and off several times for about 15 minutes.  Her heart rate was noted to be in the 120s-150s range and did increase to about 300 at which time she received a single shock of 200 J with restoration of normal sinus rhythm.  She complained of significant pain in her back but was lucid and coherent.  She was transferred to the ICU subsequently.    Past Medical History:  KEENA.  Prediabetes  Varicose veins    Social History:  Number of children: 3  Resides in a house with her Sister Cristel.   Occupational history:  "Is a manager at [employer]    Family History:  Reviewed in epic.  Reviewed in epic.    Allergies:      MAR Reviewed      ICU DAILY CHECKLIST                           Can patient transfer out of MICU? no    FAST HUG:    Feeding:  Feeding: No.  Patient is receiving NPO    Lombardo:No  Analgesia/Sedation:PRN Fentanyl  Thromboembolic prophylaxis: Yes; Mode:  Lovenox  HOB>30:  Yes  Stress Ulcer Protocol Active: Yes; Mode: PPI  Glycemic Control: Any glucose > 180 yes; Mode of Insulin Therapy: Not indicated    INTUBATED:  Can patient have daily waking:  NA  Can patient have spontaneous breathing trial:  NA    Restraints? no    PHYSICAL THERAPY AND MOBILITY:  Can patient have PT and mobility trial: no  Activity: Bed Rest      Physical Exam:  Vent settings for last 24 hours:  Resp: 16      /67 (BP Location: Left arm, Cuff Size: Adult Regular)   Pulse 69   Temp 98.5  F (36.9  C) (Oral)   Resp 16   Ht 1.778 m (5' 10\")   Wt 61.4 kg (135 lb 4.8 oz)   SpO2 95%   BMI 19.41 kg/m      Intake/Output last 3 shifts:  I/O last 3 completed shifts:  In: 810 [P.O.:810]  Out: 1650 [Urine:1650]  Intake/Output this shift:  I/O this shift:  In: 120 [P.O.:120]  Out: -     Physical Exam  Constitutional:       General: She is in acute distress.   HENT:      Head: Normocephalic.      Mouth/Throat:      Mouth: Mucous membranes are dry.   Cardiovascular:      Rate and Rhythm: Normal rate and regular rhythm.      Heart sounds: Normal heart sounds.   Pulmonary:      Effort: Pulmonary effort is normal.      Breath sounds: Normal breath sounds.   Abdominal:      General: Abdomen is flat.      Palpations: Abdomen is soft.   Skin:     General: Skin is warm.   Neurological:      General: No focal deficit present.      Mental Status: She is alert and oriented to person, place, and time.         Lines/Drains/Tubes:  Peripheral IV 12/23/23 Right Antecubital fossa (Active)   Site Assessment WDL 12/25/23 0000   Line Status Saline " locked 12/25/23 0000   Dressing Transparent 12/25/23 0000   Dressing Status clean;dry;intact 12/25/23 0000   Line Intervention Cap change;Flushed 12/25/23 0000   Phlebitis Scale 0-->no symptoms 12/25/23 0000   Infiltration? no 12/25/23 0000   Number of days: 2       LAB:  ROUTINE ICU LABS (Last four results)  CMP  Recent Labs   Lab 12/25/23  0335 12/25/23  0315 12/24/23 0428 12/23/23  1954 12/23/23  1757     --  140  --  138   POTASSIUM 3.5  --  3.8  --  4.2   CHLORIDE 99  --  99  --  98   CO2 19*  --  27  --  26   ANIONGAP 18*  --  14  --  14   * 87 80  --  98   BUN 12.4  --  16.1  --  15.4   CR 0.69  --  0.80 0.74 0.78   GFRESTIMATED >90  --  82 90 84   YO 9.2  --  9.4  --  9.6   MAG 3.7*  --  2.0  --  1.9   PHOS 4.7*  --   --   --   --      CBC  Recent Labs   Lab 12/25/23 0335 12/24/23 0428 12/23/23  1757   WBC 6.4 4.4 4.3   RBC 4.28 3.97 3.97   HGB 14.1 12.8 13.0   HCT 42.1 38.7 38.6   MCV 98 98 97   MCH 32.9 32.2 32.7   MCHC 33.5 33.1 33.7   RDW 13.2 13.2 13.3    173 174     INRNo lab results found in last 7 days.  Arterial Blood Gas  Recent Labs   Lab 12/25/23 0335   PH 7.34*       IMAGING:  TTE 12/24/23:  Interpretation Summary     1. Left ventricular chamber size is mildly enlarged. Wall thickness is normal. Systolic function is severely reduced with global hypokinesis and intraventricular dyssynchrony due to left bundle branch block. The visually estimated left ventricular ejection fraction is 20-25%.  2. Right ventricular chamber size and systolic function are normal.  3. No hemodynamically significant valvular abnormalities.  4. No prior study available for comparison.  ______________________________________________________________________________  I      WMSI = 2.00     % Normal = 0     X - Cannot   0 -                      (2) - Mildly 2 -          Segments  Size  Interpret    Hyperkinetic 1 - Normal  Hypokinetic  Hypokinetic  1-2     small                                                      7 -          3-5      moderate  3 - Akinetic 4 -          5 -         6 - Akinetic Dyskinetic   6-14    large               Dyskinetic   Aneurysmal  w/scar       w/scar       15-16   diffuse     Left Ventricle  The left ventricle is mildly dilated. There is normal left ventricular wall thickness. The visual ejection fraction is 20-25%. Left ventricular diastolic function is not assessable. There is severe global  hypokinesia of the left ventricle. Septal motion is consistent with conduction abnormality. There is  no thrombus seen in the left ventricle.     Right Ventricle  Normal right ventricle size and systolic function.     Atria  Normal left atrial size. Right atrial size is normal.     Mitral Valve  Mitral valve leaflets appear normal. There is trace mitral regurgitation.  There is no mitral valve stenosis.     Tricuspid Valve  Tricuspid valve leaflets appear normal. There is trace tricuspid regurgitation. Right ventricular systolic pressure could not be approximated due to inadequate tricuspid regurgitation. There is no tricuspid stenosis.     Aortic Valve  Aortic valve leaflets appear normal. The aortic valve is trileaflet. No aortic regurgitation is present. No aortic stenosis is present.     Pulmonic Valve  The pulmonic valve is not well visualized. There is no pulmonic valvular regurgitation. There is no pulmonic valvular stenosis.     Vessels  The aorta root is normal. The thoracic aorta is normal. IVC diameter <2.1 cm collapsing >50% with sniff suggests a normal RA pressure of 3 mmHg.     Pericardium  There is no pericardial effusion.     Rhythm  Sinus rhythm was noted.     CT PE Study 12/23/23:  1.  No pulmonary emboli.  2.  Mild cardiomegaly with prominence of left ventricle and suggestion of mild interstitial edema.      Assessment/Plan:  Sherry King is a 63 year old lady with a past medical history significant for KEENA, prediabetes and varicose veins, presenting to the hospital for  new onset heart failure with very depressed ejection fraction of 20-25% and was transferred to the intensive care unit emergently overnight after an episode of torsades devolved into ventricular tachycardia for which she required chest compressions and 1 a synchronized cardioversion for restoration of normal sinus rhythm.    NEURO:Is presently awake, alert and appropriate.  As needed fentanyl for back pain from the chest compressions.  CVS: Presented to the hospital with lightheadedness, shortness of breath and dyspnea on exertion and imaging revealed very dilated left ventricle.  Subsequent echocardiogram confirmed left ventricular hypertrophy with severe global hypokinesis and reduced ejection fraction of 20-25%.  She was also noted to be tachycardic at the time of admission was administered adenosine x 2 yesterday.  At the time of my evaluation earlier today the patient was noted to be unresponsive and had torsades.  She was administered 2 mg of magnesium with little change in her cardiac rhythm and required on and off chest compressions for ROSC (for about 15 minutes).  Her heart rate was noted to elevate to about 300 bpm and she was administered a single unsynchronized shock of 200 J with restoration of normal sinus rhythm.  I was able to speak with the cardiologist on-call who did not feel that an emergent intervention is presently warranted but that we should be in touch with them should such an event recur overnight.  Is noted to have lactic acidosis and this is likely secondary to her cardiac event.  Continue telemetry monitoring.  Have ordered isoproterenol and will initiate this at 2 mg/h if she redevelops torsades.  MAP goal of 60mmHg with SBP>90mmHg.  Trend troponin and follow BMP.  RESP: Presented with shortness of breath, dyspnea on exertion and an elevated D-dimer.  Subsequent chest imaging revealed pulmonary edema on evaluation.  She is presently saturating normally on minimal supplemental  oxygen.  Maintain SpO2 of >92%.  GI: No acute issues.  Would make the patient NPO presently given hemodynamic instability..  Initiate Protonix daily for ulcer prophylaxis  RENAL : No acute issues.  Would trend renal function daily and closely follow I/O.  Follow electrolytes and correct as abnormalities arise.  ID: No acute issues.  HEMATOLOGIC: No acute issues.  Daily CBC.  ENDOCRINE:Known medical history of prediabetes.    Accu-Cheks.   Check hemoglobin A1c.  ICU PROPHYLAXIS:Protonix, Lovenox.  DISPO:Unclear. Continues to require ICU levels of care.  Spoken to the patient's Sister Maria Guadalupe at length and she will be on her way to the hospital.    Clinically Significant Risk Factors Present on Admission                   # Acute heart failure with reduced ejection fraction: last echo with EF <40% and receiving IV diuretics         # Financial/Environmental Concerns: none             Total Critical Care Time : 1 Hour 30 Minutes    Saranya Smithvi  Pulmonary and Critical Care  [id number]

## 2023-12-26 ENCOUNTER — APPOINTMENT (OUTPATIENT)
Dept: MRI IMAGING | Facility: HOSPITAL | Age: 63
End: 2023-12-26
Attending: INTERNAL MEDICINE
Payer: COMMERCIAL

## 2023-12-26 LAB
ANION GAP SERPL CALCULATED.3IONS-SCNC: 10 MMOL/L (ref 7–15)
ATRIAL RATE - MUSE: 134 BPM
ATRIAL RATE - MUSE: 91 BPM
BUN SERPL-MCNC: 12.2 MG/DL (ref 8–23)
CALCIUM SERPL-MCNC: 9.1 MG/DL (ref 8.8–10.2)
CHLORIDE SERPL-SCNC: 99 MMOL/L (ref 98–107)
CREAT SERPL-MCNC: 0.62 MG/DL (ref 0.51–0.95)
DEPRECATED HCO3 PLAS-SCNC: 26 MMOL/L (ref 22–29)
DIASTOLIC BLOOD PRESSURE - MUSE: NORMAL MMHG
DIASTOLIC BLOOD PRESSURE - MUSE: NORMAL MMHG
EGFRCR SERPLBLD CKD-EPI 2021: >90 ML/MIN/1.73M2
ERYTHROCYTE [DISTWIDTH] IN BLOOD BY AUTOMATED COUNT: 13.2 % (ref 10–15)
GLUCOSE SERPL-MCNC: 124 MG/DL (ref 70–99)
HCT VFR BLD AUTO: 39 % (ref 35–47)
HGB BLD-MCNC: 12.9 G/DL (ref 11.7–15.7)
INTERPRETATION ECG - MUSE: NORMAL
INTERPRETATION ECG - MUSE: NORMAL
MAGNESIUM SERPL-MCNC: 1.9 MG/DL (ref 1.7–2.3)
MCH RBC QN AUTO: 32.3 PG (ref 26.5–33)
MCHC RBC AUTO-ENTMCNC: 33.1 G/DL (ref 31.5–36.5)
MCV RBC AUTO: 98 FL (ref 78–100)
P AXIS - MUSE: 68 DEGREES
P AXIS - MUSE: 79 DEGREES
PLATELET # BLD AUTO: 159 10E3/UL (ref 150–450)
POTASSIUM SERPL-SCNC: 3.8 MMOL/L (ref 3.4–5.3)
PR INTERVAL - MUSE: 140 MS
PR INTERVAL - MUSE: 216 MS
QRS DURATION - MUSE: 128 MS
QRS DURATION - MUSE: 134 MS
QT - MUSE: 378 MS
QT - MUSE: 396 MS
QTC - MUSE: 487 MS
QTC - MUSE: 564 MS
R AXIS - MUSE: -53 DEGREES
R AXIS - MUSE: -55 DEGREES
RBC # BLD AUTO: 3.99 10E6/UL (ref 3.8–5.2)
SODIUM SERPL-SCNC: 135 MMOL/L (ref 135–145)
SYSTOLIC BLOOD PRESSURE - MUSE: NORMAL MMHG
SYSTOLIC BLOOD PRESSURE - MUSE: NORMAL MMHG
T AXIS - MUSE: 133 DEGREES
T AXIS - MUSE: 93 DEGREES
VENTRICULAR RATE- MUSE: 134 BPM
VENTRICULAR RATE- MUSE: 91 BPM
WBC # BLD AUTO: 6.1 10E3/UL (ref 4–11)

## 2023-12-26 PROCEDURE — 93454 CORONARY ARTERY ANGIO S&I: CPT | Performed by: INTERNAL MEDICINE

## 2023-12-26 PROCEDURE — 250N000013 HC RX MED GY IP 250 OP 250 PS 637: Performed by: INTERNAL MEDICINE

## 2023-12-26 PROCEDURE — 93010 ELECTROCARDIOGRAM REPORT: CPT | Performed by: INTERNAL MEDICINE

## 2023-12-26 PROCEDURE — 80048 BASIC METABOLIC PNL TOTAL CA: CPT | Performed by: STUDENT IN AN ORGANIZED HEALTH CARE EDUCATION/TRAINING PROGRAM

## 2023-12-26 PROCEDURE — 250N000011 HC RX IP 250 OP 636: Mod: JZ | Performed by: INTERNAL MEDICINE

## 2023-12-26 PROCEDURE — A9585 GADOBUTROL INJECTION: HCPCS | Mod: JZ | Performed by: FAMILY MEDICINE

## 2023-12-26 PROCEDURE — C1887 CATHETER, GUIDING: HCPCS | Performed by: INTERNAL MEDICINE

## 2023-12-26 PROCEDURE — 99232 SBSQ HOSP IP/OBS MODERATE 35: CPT | Mod: GC

## 2023-12-26 PROCEDURE — 75561 CARDIAC MRI FOR MORPH W/DYE: CPT | Mod: 26 | Performed by: INTERNAL MEDICINE

## 2023-12-26 PROCEDURE — 250N000013 HC RX MED GY IP 250 OP 250 PS 637: Performed by: FAMILY MEDICINE

## 2023-12-26 PROCEDURE — 250N000011 HC RX IP 250 OP 636: Performed by: INTERNAL MEDICINE

## 2023-12-26 PROCEDURE — 272N000001 HC OR GENERAL SUPPLY STERILE: Performed by: INTERNAL MEDICINE

## 2023-12-26 PROCEDURE — 85027 COMPLETE CBC AUTOMATED: CPT

## 2023-12-26 PROCEDURE — 250N000009 HC RX 250: Performed by: INTERNAL MEDICINE

## 2023-12-26 PROCEDURE — B2111ZZ FLUOROSCOPY OF MULTIPLE CORONARY ARTERIES USING LOW OSMOLAR CONTRAST: ICD-10-PCS | Performed by: INTERNAL MEDICINE

## 2023-12-26 PROCEDURE — 250N000013 HC RX MED GY IP 250 OP 250 PS 637

## 2023-12-26 PROCEDURE — 99222 1ST HOSP IP/OBS MODERATE 55: CPT | Mod: FS | Performed by: INTERNAL MEDICINE

## 2023-12-26 PROCEDURE — 93005 ELECTROCARDIOGRAM TRACING: CPT

## 2023-12-26 PROCEDURE — 99232 SBSQ HOSP IP/OBS MODERATE 35: CPT | Mod: 25 | Performed by: INTERNAL MEDICINE

## 2023-12-26 PROCEDURE — 258N000003 HC RX IP 258 OP 636: Performed by: INTERNAL MEDICINE

## 2023-12-26 PROCEDURE — 83735 ASSAY OF MAGNESIUM: CPT

## 2023-12-26 PROCEDURE — 75561 CARDIAC MRI FOR MORPH W/DYE: CPT

## 2023-12-26 PROCEDURE — 93454 CORONARY ARTERY ANGIO S&I: CPT | Mod: 26 | Performed by: INTERNAL MEDICINE

## 2023-12-26 PROCEDURE — 999N000122 MR MYOCARDIUM  OVERREAD

## 2023-12-26 PROCEDURE — 255N000002 HC RX 255 OP 636: Performed by: INTERNAL MEDICINE

## 2023-12-26 PROCEDURE — 255N000002 HC RX 255 OP 636: Mod: JZ | Performed by: FAMILY MEDICINE

## 2023-12-26 PROCEDURE — C1894 INTRO/SHEATH, NON-LASER: HCPCS | Performed by: INTERNAL MEDICINE

## 2023-12-26 PROCEDURE — 250N000011 HC RX IP 250 OP 636

## 2023-12-26 PROCEDURE — 210N000001 HC R&B IMCU HEART CARE

## 2023-12-26 PROCEDURE — 250N000013 HC RX MED GY IP 250 OP 250 PS 637: Performed by: STUDENT IN AN ORGANIZED HEALTH CARE EDUCATION/TRAINING PROGRAM

## 2023-12-26 RX ORDER — ASPIRIN 81 MG/1
243 TABLET, CHEWABLE ORAL ONCE
Status: COMPLETED | OUTPATIENT
Start: 2023-12-26 | End: 2023-12-26

## 2023-12-26 RX ORDER — OXYCODONE HYDROCHLORIDE 5 MG/1
5 TABLET ORAL EVERY 4 HOURS PRN
Status: DISCONTINUED | OUTPATIENT
Start: 2023-12-26 | End: 2023-12-28

## 2023-12-26 RX ORDER — FENTANYL CITRATE 50 UG/ML
25 INJECTION, SOLUTION INTRAMUSCULAR; INTRAVENOUS
Status: DISCONTINUED | OUTPATIENT
Start: 2023-12-26 | End: 2023-12-26

## 2023-12-26 RX ORDER — DILTIAZEM HYDROCHLORIDE 5 MG/ML
10 INJECTION INTRAVENOUS
Status: DISCONTINUED | OUTPATIENT
Start: 2023-12-26 | End: 2023-12-26

## 2023-12-26 RX ORDER — IOPAMIDOL 755 MG/ML
90 INJECTION, SOLUTION INTRAVASCULAR ONCE
Status: DISCONTINUED | OUTPATIENT
Start: 2023-12-26 | End: 2023-12-26

## 2023-12-26 RX ORDER — METOPROLOL TARTRATE 25 MG/1
50 TABLET, FILM COATED ORAL 2 TIMES DAILY
Status: DISCONTINUED | OUTPATIENT
Start: 2023-12-26 | End: 2023-12-27

## 2023-12-26 RX ORDER — OXYCODONE HYDROCHLORIDE 5 MG/1
10 TABLET ORAL EVERY 4 HOURS PRN
Status: DISCONTINUED | OUTPATIENT
Start: 2023-12-26 | End: 2023-12-28

## 2023-12-26 RX ORDER — SODIUM CHLORIDE 9 MG/ML
75 INJECTION, SOLUTION INTRAVENOUS CONTINUOUS
Status: ACTIVE | OUTPATIENT
Start: 2023-12-26 | End: 2023-12-26

## 2023-12-26 RX ORDER — ACETAMINOPHEN 325 MG/1
650 TABLET ORAL EVERY 4 HOURS PRN
Status: DISCONTINUED | OUTPATIENT
Start: 2023-12-26 | End: 2023-12-26

## 2023-12-26 RX ORDER — METOPROLOL TARTRATE 25 MG/1
50 TABLET, FILM COATED ORAL 2 TIMES DAILY
Status: DISCONTINUED | OUTPATIENT
Start: 2023-12-26 | End: 2023-12-26

## 2023-12-26 RX ORDER — NALOXONE HYDROCHLORIDE 0.4 MG/ML
0.4 INJECTION, SOLUTION INTRAMUSCULAR; INTRAVENOUS; SUBCUTANEOUS
Status: ACTIVE | OUTPATIENT
Start: 2023-12-26 | End: 2023-12-27

## 2023-12-26 RX ORDER — NALOXONE HYDROCHLORIDE 0.4 MG/ML
0.2 INJECTION, SOLUTION INTRAMUSCULAR; INTRAVENOUS; SUBCUTANEOUS
Status: ACTIVE | OUTPATIENT
Start: 2023-12-26 | End: 2023-12-27

## 2023-12-26 RX ORDER — HYDRALAZINE HYDROCHLORIDE 20 MG/ML
10 INJECTION INTRAMUSCULAR; INTRAVENOUS
Status: DISCONTINUED | OUTPATIENT
Start: 2023-12-26 | End: 2023-12-29 | Stop reason: HOSPADM

## 2023-12-26 RX ORDER — ONDANSETRON 2 MG/ML
INJECTION INTRAMUSCULAR; INTRAVENOUS
Status: COMPLETED
Start: 2023-12-26 | End: 2023-12-26

## 2023-12-26 RX ORDER — FENTANYL CITRATE 50 UG/ML
25 INJECTION, SOLUTION INTRAMUSCULAR; INTRAVENOUS
Status: CANCELLED | OUTPATIENT
Start: 2023-12-26

## 2023-12-26 RX ORDER — POTASSIUM CHLORIDE 1500 MG/1
20 TABLET, EXTENDED RELEASE ORAL ONCE
Qty: 1 TABLET | Refills: 0 | Status: COMPLETED | OUTPATIENT
Start: 2023-12-26 | End: 2023-12-26

## 2023-12-26 RX ORDER — ATROPINE SULFATE 0.1 MG/ML
0.5 INJECTION INTRAVENOUS
Status: ACTIVE | OUTPATIENT
Start: 2023-12-26 | End: 2023-12-27

## 2023-12-26 RX ORDER — IODIXANOL 320 MG/ML
INJECTION, SOLUTION INTRAVASCULAR
Status: DISCONTINUED | OUTPATIENT
Start: 2023-12-26 | End: 2023-12-26

## 2023-12-26 RX ORDER — DILTIAZEM HYDROCHLORIDE 5 MG/ML
5 INJECTION INTRAVENOUS
Status: DISCONTINUED | OUTPATIENT
Start: 2023-12-26 | End: 2023-12-26

## 2023-12-26 RX ORDER — ONDANSETRON 2 MG/ML
4 INJECTION INTRAMUSCULAR; INTRAVENOUS ONCE
Status: COMPLETED | OUTPATIENT
Start: 2023-12-26 | End: 2023-12-26

## 2023-12-26 RX ORDER — GADOBUTROL 604.72 MG/ML
12 INJECTION INTRAVENOUS ONCE
Status: COMPLETED | OUTPATIENT
Start: 2023-12-26 | End: 2023-12-26

## 2023-12-26 RX ORDER — NITROGLYCERIN 0.4 MG/1
0.4 TABLET SUBLINGUAL ONCE
Status: COMPLETED | OUTPATIENT
Start: 2023-12-26 | End: 2023-12-26

## 2023-12-26 RX ORDER — FENTANYL CITRATE 50 UG/ML
INJECTION, SOLUTION INTRAMUSCULAR; INTRAVENOUS
Status: DISCONTINUED | OUTPATIENT
Start: 2023-12-26 | End: 2023-12-26

## 2023-12-26 RX ORDER — FLUMAZENIL 0.1 MG/ML
0.2 INJECTION, SOLUTION INTRAVENOUS
Status: ACTIVE | OUTPATIENT
Start: 2023-12-26 | End: 2023-12-27

## 2023-12-26 RX ORDER — METOPROLOL TARTRATE 1 MG/ML
5 INJECTION, SOLUTION INTRAVENOUS
Status: DISCONTINUED | OUTPATIENT
Start: 2023-12-26 | End: 2023-12-26

## 2023-12-26 RX ORDER — LIDOCAINE 40 MG/G
CREAM TOPICAL
Status: DISCONTINUED | OUTPATIENT
Start: 2023-12-26 | End: 2023-12-26

## 2023-12-26 RX ORDER — SODIUM CHLORIDE 9 MG/ML
INJECTION, SOLUTION INTRAVENOUS CONTINUOUS
Status: DISCONTINUED | OUTPATIENT
Start: 2023-12-26 | End: 2023-12-26

## 2023-12-26 RX ORDER — MAGNESIUM OXIDE 400 MG/1
400 TABLET ORAL EVERY 4 HOURS
Qty: 2 TABLET | Refills: 0 | Status: COMPLETED | OUTPATIENT
Start: 2023-12-26 | End: 2023-12-26

## 2023-12-26 RX ORDER — ASPIRIN 325 MG
325 TABLET ORAL ONCE
Status: COMPLETED | OUTPATIENT
Start: 2023-12-26 | End: 2023-12-26

## 2023-12-26 RX ADMIN — ACETAMINOPHEN 650 MG: 325 TABLET ORAL at 09:25

## 2023-12-26 RX ADMIN — ENOXAPARIN SODIUM 40 MG: 40 INJECTION SUBCUTANEOUS at 20:34

## 2023-12-26 RX ADMIN — POTASSIUM CHLORIDE 20 MEQ: 1500 TABLET, EXTENDED RELEASE ORAL at 08:16

## 2023-12-26 RX ADMIN — ACETAMINOPHEN 650 MG: 325 TABLET ORAL at 00:11

## 2023-12-26 RX ADMIN — ONDANSETRON 4 MG: 2 INJECTION INTRAMUSCULAR; INTRAVENOUS at 15:19

## 2023-12-26 RX ADMIN — MAGNESIUM OXIDE TAB 400 MG (241.3 MG ELEMENTAL MG) 400 MG: 400 (241.3 MG) TAB at 08:16

## 2023-12-26 RX ADMIN — LIDOCAINE 2 PATCH: 4 PATCH TOPICAL at 08:16

## 2023-12-26 RX ADMIN — GADOBUTROL 12 ML: 604.72 INJECTION INTRAVENOUS at 13:00

## 2023-12-26 RX ADMIN — METOPROLOL TARTRATE 50 MG: 25 TABLET, FILM COATED ORAL at 15:18

## 2023-12-26 RX ADMIN — ACETAMINOPHEN 650 MG: 325 TABLET ORAL at 20:34

## 2023-12-26 RX ADMIN — MAGNESIUM OXIDE TAB 400 MG (241.3 MG ELEMENTAL MG) 400 MG: 400 (241.3 MG) TAB at 11:22

## 2023-12-26 RX ADMIN — ASPIRIN 325 MG ORAL TABLET 325 MG: 325 PILL ORAL at 15:18

## 2023-12-26 RX ADMIN — CARVEDILOL 6.25 MG: 3.12 TABLET, FILM COATED ORAL at 08:16

## 2023-12-26 RX ADMIN — MAGNESIUM OXIDE TAB 400 MG (241.3 MG ELEMENTAL MG) 200 MG: 400 (241.3 MG) TAB at 08:17

## 2023-12-26 RX ADMIN — METOPROLOL TARTRATE 5 MG: 1 INJECTION, SOLUTION INTRAVENOUS at 14:25

## 2023-12-26 RX ADMIN — SODIUM CHLORIDE 75 ML/HR: 9 INJECTION, SOLUTION INTRAVENOUS at 16:20

## 2023-12-26 RX ADMIN — Medication 25 MCG: at 08:16

## 2023-12-26 RX ADMIN — SPIRONOLACTONE 25 MG: 25 TABLET ORAL at 08:16

## 2023-12-26 RX ADMIN — ACETAMINOPHEN 650 MG: 325 TABLET ORAL at 16:27

## 2023-12-26 RX ADMIN — SODIUM CHLORIDE: 9 INJECTION, SOLUTION INTRAVENOUS at 15:20

## 2023-12-26 RX ADMIN — ACETAMINOPHEN 650 MG: 325 TABLET ORAL at 04:36

## 2023-12-26 ASSESSMENT — ACTIVITIES OF DAILY LIVING (ADL)
ADLS_ACUITY_SCORE: 20

## 2023-12-26 NOTE — SIGNIFICANT EVENT
Significant Event Note    Time of event: 2:49 PM December 26, 2023    Description of event:  Rapid response was called for tachycardia.    Briefly, 63-year-old with new diagnosis of dilated cardiomyopathy with severely depressed LV systolic function, episode of polymorphic V. tach initiated by R-on-T phenomenon, palpitations due to atrial tachycardia.    Patient was down in CT preparing for CT coronary angiogram, initially heart rates in the 80s to 90s, sudden increase to the 120s 130s.  Was given a dose of IV metoprolol without improvement of her heart rates.  She was experiencing palpitations at that time, otherwise feeling well.  No chest pain or shortness of breath.    Initial blood pressure in the 90s systolic.    On exam, she was comfortable, alert and oriented.  No acute distress.  Respiratory -she is breathing comfortably. Good air movement bilaterally  Cardiac - Tachycardic. Regular rhythm    Repeat EKG similar to prior earlier in the afternoon    Dr. Harrison (cardiology) was present at the rapid response.    Patient was feeling nauseous after returning to the floor, verbal order for Zofran was placed under my name prior to further discussion, and was administered.  Would like to avoid further potential QT prolonging medications.    Plan:  Currently feels at her baseline, has been going in and out of atrial tachycardia throughout the day.  Heart rate still in the 130s.  Normal blood pressure.  She was placed on nasal cannula for oxygen saturations of 89.  No respiratory distress.  Cardiology aware, planning for coronary angiogram this afternoon if able.  They will assist with ongoing rate controlling medications.    Discussed with: bedside nurse    MD Sergey Elam

## 2023-12-26 NOTE — CONSULTS
[facility]  Cardiac Electrophysiology  [address]  [place] [address]   Office: [phone]  Fax: [phone]     Cardiac Electrophysiology Consultation    Patient: Sherry King   : 1960     Referring Provider: No ref. provider found    CHIEF COMPLAINT/REASON FOR CONSULTATION  LBBB new, Polymorphic VT, HFrEF    Assessment/Recommendations   Polymorphic VT with new LBBB: Telemetry reviewed, primarily normal sinus rhythm with intermittent bursts of symptomatic tachycardia, she does note palpitations with lightheadedness and mild fatigue. No longer experiencing diaphoresis or significant SOBOE or at rest.  Was initially scheduled for catheter ablation but was found unresponsive in the early morning of .  Found to be in torsades related to V. tach, she did require resuscitation, achieved ROSC after about 15 minutes of chest compressions and after receiving 1 synchronized shock of 200 J.  Admitted to ICU for closer monitoring and then transitioned to telemetry. EKG findings initially NSR with new LBBB. Echo came back significant for severely reduced systolic function with global hypokinesis and intraventricular dyssynchrony d/t LBBB, EF 20-25%. Etiology probably nonischemic based on absence of coronary calcium.  Plan for CT angio and cardiac MRI today.      HFrEF:     - carvedilol 6.25 BID and spironolactone 25 mg daily   - ARB or Entresto in outpatient once BP allows      Plan:   -CTA angio and cardiac MRI today.    -CRT-D most likely tomorrow on 2023.    -Currently not on antiarrhythmic therapy due to her prolonged QT.  External pacing pads in place.    -Case discussed with Dr. Garcia.              History of Present Illness   Sherry Kign is a 63 year old female who presented to the ED on 23 by referral for evaluation of chest tightness and palpitations.     The patient was at work on 23 when she experienced an episode of chest  "tightness, palpations, shortness of breath and hot and cold flashes. The patient then sat down until the episode passed after 30 minutes. On the  morning of 12/23/23, the patient endorsed palpitations and checked her heart rate to be 165 BPM at home. She stated that her palpitations increase with exertion. The patient denied cough, fever, leg swelling or any other complaints at this time. She has no personal cardiac history but does have a family cardiac history.     Review of telemetry shows normal sinus rhythm with heart rates in the 70s and 80s with intermittent bursts of sinus tachycardia, symptomatic reporting lightheadedness, palpitations lasting upwards of 1 minute and mild fatigue.  Her blood pressures remain normotensive, she remains on room air. Denies diaphoresis, shortness of breath at rest or when up to the bathroom or presyncope.  She is scheduled for CT angio and cardiac MRI today.  External pacing pads in place.  Remains euvolemic on exam today due to her history of heart failure with reduced ejection fraction.  2 of her 3 daughters are currently at bedside.  Patient resides with her sister.  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  Typically consumes 2 glasses of wine per night after work, caffeine intake is minimal, she is a non-smoker.  On average she typically consumes about 40 ounces of water per day.       Physical Examination  Review of Systems   VITALS: /66 (BP Location: Left arm)   Pulse 83   Temp 97.9  F (36.6  C) (Oral)   Resp 18   Ht 1.778 m (5' 10\")   Wt 61 kg (134 lb 6.4 oz)   SpO2 100%   BMI 19.28 kg/m    Wt Readings from Last 3 Encounters:   12/26/23 61 kg (134 lb 6.4 oz)   12/23/23 62.1 kg (137 lb)   01/29/17 56 kg (123 lb 6.4 oz)       Intake/Output " Summary (Last 24 hours) at 12/26/2023 1053  Last data filed at 12/26/2023 1000  Gross per 24 hour   Intake 740 ml   Output 375 ml   Net 365 ml     CONSTITUTIONAL: no distress, resting in bed.  EYES:  Conjunctivae pink, sclerae clear.    E/N/T:  Oral mucosa pink, moist mucous membranes  RESPIRATORY:  Respiratory effort is normal, lungs CTA in all fields.  CARDIOVASCULAR:  normal S1 and S2, no murmur, rub or gallop. No BLE edema  present  GASTROINTESTINAL:  Abdomen without masses or tenderness  EXTREMITIES:  No clubbing or cyanosis.    MUSCULOSKELETAL:  Overall grossly normal muscle strength  SKIN:  Overall, skin warm and dry, no lesions.  NEURO/PSYCH:  Oriented x 3 with normal affect.   Constitutional:  No weight loss or loss of appetite    Eyes:  No difficulty with vision, no double vision, no dry eyes  ENT:  No sore throat, difficulty swallowing; changes in hearing or tinnitus  Cardiovascular: As detailed above  Respiratory:  No cough  Musculoskeletal  No joint pain, muscle aches  Neurologic:  No syncope,fainting spells   Hematologic: No easy bruising, excessive bleeding tendency   Gastrointestinal:  No jaundice, abdominal pain or abdominal bloating  Genitourinary: No changes in urinary habits, no trouble urinating    Psychiatric: No anxiety or depression      Medical History  Surgical History   History reviewed. No pertinent past medical history. Past Surgical History:   Procedure Laterality Date    PICC TRIPLE LUMEN PLACEMENT  12/25/2023         Family History Social History   History reviewed. No pertinent family history.     Social History     Tobacco Use    Smoking status: Never     Passive exposure: Never    Smokeless tobacco: Never         Medications  Allergies     Current Facility-Administered Medications:     acetaminophen (TYLENOL) tablet 650 mg, 650 mg, Oral, Q4H PRN, 650 mg at 12/26/23 0925 **OR** acetaminophen (TYLENOL) Suppository 650 mg, 650 mg, Rectal, Q4H PRN, Je Wade MD    calcium  carbonate (TUMS) chewable tablet 1,000 mg, 1,000 mg, Oral, 4x Daily PRN, Je Wade MD    carvedilol (COREG) tablet 6.25 mg, 6.25 mg, Oral, BID w/meals, Kia Spann MD, 6.25 mg at 12/26/23 0816    enoxaparin ANTICOAGULANT (LOVENOX) injection 40 mg, 40 mg, Subcutaneous, Q24H, Je Wade MD, 40 mg at 12/25/23 2101    [Held by provider] furosemide (LASIX) tablet 20 mg, 20 mg, Oral, BID, Je Wade MD    Lidocaine (LIDOCARE) 4 % Patch 2 patch, 2 patch, Transdermal, Q24H, Shahzad Bailey MD, 2 patch at 12/26/23 0816    lidocaine (LMX4) cream, , Topical, Q1H PRN, Saranya Chase MD    lidocaine 1 % 0.1-5 mL, 0.1-5 mL, Other, Q1H PRN, Saranya Chase MD, 2 mL at 12/25/23 0849    magnesium oxide (MAG-OX) half-tab 200 mg, 200 mg, Oral, Daily, Je Wade MD, 200 mg at 12/26/23 0817    magnesium oxide (MAG-OX) tablet 400 mg, 400 mg, Oral, Q4H, Shahzad Bailey MD, 400 mg at 12/26/23 0816    melatonin tablet 5 mg, 5 mg, Oral, At Bedtime PRN, Je Wade MD    senna-docusate (SENOKOT-S/PERICOLACE) 8.6-50 MG per tablet 1 tablet, 1 tablet, Oral, BID PRN **OR** senna-docusate (SENOKOT-S/PERICOLACE) 8.6-50 MG per tablet 2 tablet, 2 tablet, Oral, BID PRN, Je Wade MD    sodium chloride (PF) 0.9% PF flush 10-40 mL, 10-40 mL, Intracatheter, Once PRN, Saranya Chase MD    sodium chloride (PF) 0.9% PF flush 3 mL, 3 mL, Intracatheter, Q8H, Je Wade MD, 3 mL at 12/26/23 0620    sodium chloride (PF) 0.9% PF flush 3 mL, 3 mL, Intracatheter, q1 min prn, Je Wade MD    spironolactone (ALDACTONE) tablet 25 mg, 25 mg, Oral, Daily, William Harrison MD, 25 mg at 12/26/23 0816    Vitamin D3 (CHOLECALCIFEROL) tablet 25 mcg, 25 mcg, Oral, Daily, Je Wade MD, 25 mcg at 12/26/23 0816   No Known Allergies       Lab Results    Chemistry CBC Cardiac Enzymes/BNP/TSH/INR   Recent Labs   Lab Test 12/26/23  0615      POTASSIUM 3.8   CHLORIDE 99   CO2 26   *   BUN 12.2   CR 0.62   GFRESTIMATED  ">90   YO 9.1     Recent Labs   Lab Test 12/26/23  0615 12/25/23  2139 12/25/23  0335   CR 0.62 0.66 0.69          Recent Labs   Lab Test 12/26/23  0615   WBC 6.1   HGB 12.9   HCT 39.0   MCV 98        Recent Labs   Lab Test 12/26/23  0615 12/25/23  0335 12/24/23  0428   HGB 12.9 14.1 12.8    No results for input(s): \"TROPONINI\" in the last 14463 hours.  Recent Labs   Lab Test 12/23/23  1757   NTBNPI 2,726*     Recent Labs   Lab Test 12/23/23  1757   TSH 7.94*     No results for input(s): \"INR\" in the last 63944 hours.      Data Review    ECGs (all tracings independently reviewed)  12/26/23 12/23/23 sinus tachycardia LBBB rate 101  ms QT/QTC: 400/518  ms      ECHO 12/23/23  1. Left ventricular chamber size is mildly enlarged. Wall thickness is normal.  Systolic function is severely reduced with global hypokinesis and  intraventricular dyssynchrony due to left bundle branch block. The visually  estimated left ventricular ejection fraction is 20-25%.  2. Right ventricular chamber size and systolic function are normal.  3. No hemodynamically significant valvular abnormalities.  4. No prior study available for comparison.               Karolina Martin NP  Clinical Cardiac Electrophysiology  [facility]  Office: [phone]  Fax: [phone]   Nurses: [phone]      50 minutes spent reviewing prior records (including documentation, laboratory studies, cardiac testing/imaging), history and physical exam, planning, and subsequent documentation.         "

## 2023-12-26 NOTE — PROVIDER NOTIFICATION
RN was notified of pt. changes in tele - p waves dropped with increased HR. Stat EKG ordered. House officer, Dr. Spann, paged. Labs ordered.

## 2023-12-26 NOTE — PROGRESS NOTES
[facility]    Progress Note - Hospitalist Service       Date of Admission:  12/23/2023    Assessment & Plan   Sherry King is a 63 year old female admitted on 12/23/2023. She does not have significant past medical history and is admitted for new transient episodes of symptomatic tachycardia.     Transient atrial tachycardia, symptomatic   Torsades s/p CPR w/ asynchronized cardioversion x1 12/25  Initially NSR on tele strips, but continues to have episodes of symptomatic tachycardia with associated palpitations, dyspnea and dizziness. Cards initially planned for ablation but found unresponsive early AM 12/25 with sustained torsades requiring resuscitation. Achieved ROSC after about 15 min of chest compressions and one 200J asynchronized shock. Transferred to ICU for closer monitoring, but quickly stabilized and able to transfer back to floor later same day. Patient still having symptomatic episodes overnight but overall feeling better.   - cardiology following with above plan  - CRT-D likely tomorrow 12/27, then possible discharge   - NO antiarrhythmics given prolonged QT  - telemetry      HFrEF exacerbation  LBBB   Dilated cardiomyopathy  Transient symptoms starting 2 days PTA. No prior cardiac history. Endorses new PETERS, denies PND or orthopnea, no JVD or peripheral edema on exam. D-dimer 2.76, CTPE negative for PE but demonstrated mild cardiomegaly with mild interstitial edema. BNP 2700, trop 29 with stable repeat. A1c 5.1, lipids unremarkable. EKG findings initially NSR with new LBBB. Echo came back significant for severely reduced systolic function with global hypokinesis and intraventricular dyssynchrony d/t LBBB, EF 20-25%. Etiology probably nonischemic based on absence of coronary calcium. Viral myocarditis unlikely with normal CRP.  - cardiology following   - cardiac MR and CT coronary angio today   - carvedilol 6.25 BID and spironolactone 25 mg daily   - ARB or Entresto in  outpatient once BP allows  - telemetry       Subclinical hypothyroidism  TSH 7.94 but T4 wnl.  - recheck in outpatient           Diet: Combination Diet Regular Diet Adult; 2 gm NA Diet  Fluid restriction 2000 ML FLUID    DVT Prophylaxis: Enoxaparin (Lovenox) SQ  Lombardo Catheter: Not present  Fluids: PO  Lines: PRESENT      PICC 12/25/23 Triple Lumen Right Basilic Vascular access-Site Assessment: WDL    Cardiac Monitoring: ACTIVE order. Indication: Tachyarrhythmias, acute (48 hours)  Code Status: Full Code      Clinically Significant Risk Factors                   # Acute heart failure with reduced ejection fraction: last echo with EF <40% and receiving IV diuretics           # Financial/Environmental Concerns: none         Disposition Plan     Expected Discharge Date: 12/27/2023                The patient's care was discussed with the Attending Physician, Dr. Aragon.     Vitaly Loyola MD  Hospitalist Service  [facility]  Securely message with [employer] (more info)  Text page via [employer] Paging/Directory   ______________________________________________________________________    Interval History   Doing much better this AM. Pain well controlled.   Did have one episode of palpitations overnight.   Per nursing, p waves dropped with increased HR.   MR and CT angio later today. Reports she may be able to go home tomorrow after CRT-D placed.     Physical Exam   Vital Signs: Temp: 97.6  F (36.4  C) Temp src: Oral BP: 107/74 Pulse: 109   Resp: 20 SpO2: 94 % O2 Device: None (Room air)    Weight: 134 lbs 6.4 oz    General Appearance: Sitting up in bed, pleasant, appears comfortable, alert, NAD.  Respiratory: No increased work of breathing. CTAB- no wheezing, crackles, rhonchi.   Cardiovascular: RRR, no murmurs. No peripheral edema.   GI: Soft, nondistended, nontender.   Skin: Dry, warm.   Other: Calm, A&Ox3.     Medical Decision Making       Please see A&P for additional details of medical decision  making.      Data   ------------------------- PAST 24 HR DATA REVIEWED -----------------------------------------------

## 2023-12-26 NOTE — PLAN OF CARE
Problem: Dysrhythmia  Goal: Normalized Cardiac Rhythm  12/26/2023 1039 by Erin Beckwith, RN  Outcome: Progressing  12/26/2023 1039 by Erin Beckwith, RN  Outcome: Progressing       Pt remains in SR w/ BBB, defib pads in place per orders. This afternoon after pt had MRI pt had a tachycardic rhythm that appeared to have dropped P waves. MD and EP notified, EKG obtained. Pt HR would then slow down and appear in her regular NSR. Pt with pain to back and chest PRN tylenol administered and effective. Family at bedside and updated. YEIMY RN transported pt to MRI and monitored. Pt was in CT having procedure when RRT called d/t elevated HR. RN went down to give background information to the team if needed. Pt was in same rhythm as after MRI. Cardiologist came down to assess and stated that pt would have a coronary angiogram and he would call MD. Pt transported back to her room, HR continuing in 130's. Pt was prepped for angiogram, IVF started, ASA given and scheduled metoprolol given early per Dr. Harrison. Pt had angiogram at 1530 and arrived back to her room around 1615. TR band removed without difficulty.

## 2023-12-26 NOTE — PROGRESS NOTES
"    Cardiology Progress Note    Assessment:  Polymorphic VT initiated by R-on-T phenomenon/aborted sudden cardiac on 12/24/2023  Left bundle branch block, newly diagnosed  Dilated cardiomyopathy with severely depressed LV systolic function, newly diagnosed, probably nonischemic based on absence of coronary calcium rule out viral myocarditis  Acute heart failure with reduced ejection fraction no overt fluid overload but mild pulmonary congestion on CT, improved volume status  Heart palpitation due to atrial tachycardia    Plan:  Cardiac MR and CT coronary angiogram today    Continue current dose of carvedilol-outpatients up titration  Continue spironolactone  Add ARB or Entresto on outpatient basis when blood pressure allows    EP consult-should get CRT-D for secondary prevention prior to discharge  Will not use antiarrhythmics as she has prolong QT interval    Subjective:   Denies chest pains or increasing shortness of breath.  Had only brief episode of heart palpitations    Objective:   /66 (BP Location: Left arm)   Pulse 83   Temp 97.9  F (36.6  C) (Oral)   Resp 18   Ht 1.778 m (5' 10\")   Wt 61 kg (134 lb 6.4 oz)   SpO2 100%   BMI 19.28 kg/m      Intake/Output Summary (Last 24 hours) at 12/25/2023 0856  Last data filed at 12/25/2023 0315  Gross per 24 hour   Intake 570 ml   Output 600 ml   Net -30 ml         Physical Exam:  GENERAL: no distress  NECK: No JVD  LUNGS: Clear to auscultation.  CARDIAC: regular  rhythm, S1 & S2 normal.  No heaves, thrills, gallops or murmurs.  ABDOMEN: flat, negative hepatosplenomegaly, soft and non-tender.  EXTREMITIES: No evidence of cyanosis, clubbing or edema.    Current Facility-Administered Medications Ordered in Epic   Medication Dose Route Frequency Provider Last Rate Last Admin    acetaminophen (TYLENOL) tablet 650 mg  650 mg Oral Q4H PRN Je Wade MD   650 mg at 12/26/23 0436    Or    acetaminophen (TYLENOL) Suppository 650 mg  650 mg Rectal Q4H PRN Sheri, " Je MONTOYA MD        calcium carbonate (TUMS) chewable tablet 1,000 mg  1,000 mg Oral 4x Daily PRN Je Wade MD        carvedilol (COREG) tablet 6.25 mg  6.25 mg Oral BID w/meals Kia Spann MD   6.25 mg at 12/26/23 0816    enoxaparin ANTICOAGULANT (LOVENOX) injection 40 mg  40 mg Subcutaneous Q24H Je Wade MD   40 mg at 12/25/23 2101    [Held by provider] furosemide (LASIX) tablet 20 mg  20 mg Oral BID Je Wade MD        Lidocaine (LIDOCARE) 4 % Patch 2 patch  2 patch Transdermal Q24H Shahzad Bailey MD   2 patch at 12/26/23 0816    lidocaine (LMX4) cream   Topical Q1H PRN Saranya Chase MD        lidocaine (LMX4) cream   Topical Q1H PRN Je Wade MD        lidocaine 1 % 0.1-1 mL  0.1-1 mL Other Q1H PRN Je Wade MD        lidocaine 1 % 0.1-5 mL  0.1-5 mL Other Q1H PRN Saranya Chase MD   2 mL at 12/25/23 0849    magnesium oxide (MAG-OX) half-tab 200 mg  200 mg Oral Daily Je Wade MD   200 mg at 12/26/23 0817    magnesium oxide (MAG-OX) tablet 400 mg  400 mg Oral Q4H Shahzad Bailey MD   400 mg at 12/26/23 0816    melatonin tablet 5 mg  5 mg Oral At Bedtime PRN Je Wade MD        senkalia-docusate (SENOKOT-S/PERICOLACE) 8.6-50 MG per tablet 1 tablet  1 tablet Oral BID PRN Je Wade MD        Or    senna-docusate (SENOKOT-S/PERICOLACE) 8.6-50 MG per tablet 2 tablet  2 tablet Oral BID PRN Je Wade MD        sodium chloride (PF) 0.9% PF flush 10-40 mL  10-40 mL Intracatheter Once PRN Saranya Chase MD        sodium chloride (PF) 0.9% PF flush 3 mL  3 mL Intracatheter Q8H Je Wade MD   3 mL at 12/26/23 0620    sodium chloride (PF) 0.9% PF flush 3 mL  3 mL Intracatheter q1 min prn Je Wade MD        spironolactone (ALDACTONE) tablet 25 mg  25 mg Oral Daily William Harrison MD   25 mg at 12/26/23 0816    Vitamin D3 (CHOLECALCIFEROL) tablet 25 mcg  25 mcg Oral Daily Je Wade MD   25 mcg at 12/26/23 0816     No current Epic-ordered outpatient  "medications on file.       Cardiographics:    Telemetry: Normal sinus rhythm  ECG: Normal sinus rhythm left bundle branch block    ECHO (personnaly Reviewed):   1. Left ventricular chamber size is mildly enlarged. Wall thickness is normal.  Systolic function is severely reduced with global hypokinesis and  intraventricular dyssynchrony due to left bundle branch block. The visually  estimated left ventricular ejection fraction is 20-25%.  2. Right ventricular chamber size and systolic function are normal.  3. No hemodynamically significant valvular abnormalities.  4. No prior study available for comparison.     Lab Results    Chemistry/lipid CBC Cardiac Enzymes/BNP/TSH/INR   Recent Labs   Lab Test 12/24/23  0428   CHOL 216*      LDL 98   TRIG 76     Recent Labs   Lab Test 12/24/23  0428   LDL 98     Recent Labs   Lab Test 12/25/23  0335      POTASSIUM 3.5   CHLORIDE 99   CO2 19*   *   BUN 12.4   CR 0.69   GFRESTIMATED >90   YO 9.2     Recent Labs   Lab Test 12/25/23  0335 12/24/23  0428 12/23/23  1954   CR 0.69 0.80 0.74     Recent Labs   Lab Test 12/25/23  0335   A1C 5.1          Recent Labs   Lab Test 12/25/23  0335   WBC 6.4   HGB 14.1   HCT 42.1   MCV 98        Recent Labs   Lab Test 12/25/23  0335 12/24/23  0428 12/23/23  1757   HGB 14.1 12.8 13.0    No results for input(s): \"TROPONINI\" in the last 68911 hours.  Recent Labs   Lab Test 12/23/23  1757   NTBNPI 2,726*     Recent Labs   Lab Test 12/23/23  1757   TSH 7.94*     No results for input(s): \"INR\" in the last 58306 hours.                "

## 2023-12-26 NOTE — PROGRESS NOTES
Coronary CT Angiogram    Pt arrived to CT around 1410. HR 88-95 and /71.  Pt given Metoprolol 5mg IV per protocol. HR still mid 80s and BP soft. Called Dr. Harrison to see how to proceed. Ordered to proceed with test if HR <85. Prior to giving Nitroglycerin 0.4mg SL per protocol, noted HR to be 120-130's. Attempted to have pt bear down to lower HR. Pt stated her HR had been elevated earlier, but RN unaware of episodes. EKG tech called for EKG. HR still sustained 120-140's, pt denies any chest pain, but did c/o palpitations, slightly lightheaded and appeared cool and clammy. Rapid response called to ensure resources and obtain EKG.  Team arrived. BP 96/78, , O2 98% on RA. Pt alert and cooperative. Dr. Harrison arrived and stated pt has been going in/out of AT. Plan to cancel CCTA due to elevated HR. Pt's bedside RNErin, here during rapid response and aware of this new plan. Pt transferred back to cart and transferred back to room 306.    Kia Avila, RN

## 2023-12-26 NOTE — PLAN OF CARE
Goal Outcome Evaluation:      Plan of Care Reviewed With: patient    Overall Patient Progress: improvingOverall Patient Progress: improving         A & O X4. VSS on RA. Sinus rhythm w/BBB. Tylenol and ice packs given for pain management. On K+ and Mag+ protocol, recheck tomorrow. Cardiac MRI and CTA today. MRI checklist done with patient.

## 2023-12-26 NOTE — PRE-PROCEDURE
GENERAL PRE-PROCEDURE:   Date/Time:  12/26/2023 3:41 PM    Written consent obtained?: Yes    Risks and benefits: Risks, benefits and alternatives were discussed    Consent given by:  Patient  Patient states understanding of procedure being performed: Yes    Patient's understanding of procedure matches consent: Yes    Procedure consent matches procedure scheduled: Yes    Expected level of sedation:  Minimal  Appropriately NPO:  Yes  ASA Class:  3  Mallampati  :  Grade 2- soft palate, base of uvula, tonsillar pillars, and portion of posterior pharyngeal wall visible  Lungs:  Lungs clear with good breath sounds bilaterally  Heart:  Normal heart sounds and rate  History & Physical reviewed:  History and physical reviewed and no updates needed  Statement of review:  I have reviewed the lab findings, diagnostic data, medications, and the plan for sedation    Chart reviewed. Patient seen and examined.   Questions answered.   Right radial pulse good.  Will proceed with study as planned.  Simon Kwon MD on 12/26/2023 at 3:42 PM

## 2023-12-27 LAB
ANION GAP SERPL CALCULATED.3IONS-SCNC: 7 MMOL/L (ref 7–15)
BUN SERPL-MCNC: 8.8 MG/DL (ref 8–23)
CALCIUM SERPL-MCNC: 9.4 MG/DL (ref 8.8–10.2)
CHLORIDE SERPL-SCNC: 102 MMOL/L (ref 98–107)
CREAT SERPL-MCNC: 0.59 MG/DL (ref 0.51–0.95)
DEPRECATED HCO3 PLAS-SCNC: 28 MMOL/L (ref 22–29)
EGFRCR SERPLBLD CKD-EPI 2021: >90 ML/MIN/1.73M2
ERYTHROCYTE [DISTWIDTH] IN BLOOD BY AUTOMATED COUNT: 13.2 % (ref 10–15)
GLUCOSE SERPL-MCNC: 97 MG/DL (ref 70–99)
HCT VFR BLD AUTO: 38.9 % (ref 35–47)
HGB BLD-MCNC: 12.9 G/DL (ref 11.7–15.7)
MAGNESIUM SERPL-MCNC: 1.9 MG/DL (ref 1.7–2.3)
MCH RBC QN AUTO: 32.7 PG (ref 26.5–33)
MCHC RBC AUTO-ENTMCNC: 33.2 G/DL (ref 31.5–36.5)
MCV RBC AUTO: 99 FL (ref 78–100)
PLATELET # BLD AUTO: 163 10E3/UL (ref 150–450)
POTASSIUM SERPL-SCNC: 4.4 MMOL/L (ref 3.4–5.3)
RBC # BLD AUTO: 3.94 10E6/UL (ref 3.8–5.2)
SODIUM SERPL-SCNC: 137 MMOL/L (ref 135–145)
WBC # BLD AUTO: 5.6 10E3/UL (ref 4–11)

## 2023-12-27 PROCEDURE — 250N000013 HC RX MED GY IP 250 OP 250 PS 637: Performed by: INTERNAL MEDICINE

## 2023-12-27 PROCEDURE — 85027 COMPLETE CBC AUTOMATED: CPT | Performed by: INTERNAL MEDICINE

## 2023-12-27 PROCEDURE — 80048 BASIC METABOLIC PNL TOTAL CA: CPT | Performed by: INTERNAL MEDICINE

## 2023-12-27 PROCEDURE — 83735 ASSAY OF MAGNESIUM: CPT | Performed by: INTERNAL MEDICINE

## 2023-12-27 PROCEDURE — 250N000013 HC RX MED GY IP 250 OP 250 PS 637

## 2023-12-27 PROCEDURE — 99232 SBSQ HOSP IP/OBS MODERATE 35: CPT | Mod: 57 | Performed by: NURSE PRACTITIONER

## 2023-12-27 PROCEDURE — 99233 SBSQ HOSP IP/OBS HIGH 50: CPT | Performed by: INTERNAL MEDICINE

## 2023-12-27 PROCEDURE — 99232 SBSQ HOSP IP/OBS MODERATE 35: CPT | Mod: GC

## 2023-12-27 PROCEDURE — 250N000013 HC RX MED GY IP 250 OP 250 PS 637: Performed by: FAMILY MEDICINE

## 2023-12-27 PROCEDURE — 210N000001 HC R&B IMCU HEART CARE

## 2023-12-27 RX ORDER — LOSARTAN POTASSIUM 25 MG/1
25 TABLET ORAL DAILY
Status: DISCONTINUED | OUTPATIENT
Start: 2023-12-28 | End: 2023-12-29 | Stop reason: HOSPADM

## 2023-12-27 RX ORDER — METOPROLOL SUCCINATE 50 MG/1
50 TABLET, EXTENDED RELEASE ORAL 2 TIMES DAILY
Status: DISCONTINUED | OUTPATIENT
Start: 2023-12-27 | End: 2023-12-29 | Stop reason: HOSPADM

## 2023-12-27 RX ORDER — MAGNESIUM OXIDE 400 MG/1
400 TABLET ORAL EVERY 4 HOURS
Status: COMPLETED | OUTPATIENT
Start: 2023-12-27 | End: 2023-12-27

## 2023-12-27 RX ADMIN — LIDOCAINE 2 PATCH: 4 PATCH TOPICAL at 08:11

## 2023-12-27 RX ADMIN — ACETAMINOPHEN 650 MG: 325 TABLET ORAL at 19:25

## 2023-12-27 RX ADMIN — METOPROLOL SUCCINATE 50 MG: 50 TABLET, EXTENDED RELEASE ORAL at 20:57

## 2023-12-27 RX ADMIN — Medication 25 MCG: at 08:10

## 2023-12-27 RX ADMIN — ACETAMINOPHEN 650 MG: 325 TABLET ORAL at 12:28

## 2023-12-27 RX ADMIN — MAGNESIUM OXIDE TAB 400 MG (241.3 MG ELEMENTAL MG) 400 MG: 400 (241.3 MG) TAB at 08:10

## 2023-12-27 RX ADMIN — METOPROLOL TARTRATE 50 MG: 25 TABLET, FILM COATED ORAL at 04:55

## 2023-12-27 RX ADMIN — ACETAMINOPHEN 650 MG: 325 TABLET ORAL at 08:10

## 2023-12-27 RX ADMIN — MAGNESIUM OXIDE TAB 400 MG (241.3 MG ELEMENTAL MG) 200 MG: 400 (241.3 MG) TAB at 08:11

## 2023-12-27 RX ADMIN — SPIRONOLACTONE 25 MG: 25 TABLET ORAL at 08:10

## 2023-12-27 RX ADMIN — MAGNESIUM OXIDE TAB 400 MG (241.3 MG ELEMENTAL MG) 400 MG: 400 (241.3 MG) TAB at 12:28

## 2023-12-27 ASSESSMENT — ACTIVITIES OF DAILY LIVING (ADL)
ADLS_ACUITY_SCORE: 20

## 2023-12-27 NOTE — PLAN OF CARE
Problem: Adult Inpatient Plan of Care  Goal: Optimal Comfort and Wellbeing  Outcome: Progressing     Problem: Acute Coronary Syndrome  Goal: Normalized Cardiac Rhythm  Outcome: Progressing   Goal Outcome Evaluation:    Pt restful, comfortable over noc. HR stable on tele. Metoprolol given per md order with updated parameter of sbp>100. NPO in anticipation of possible ICD placement.

## 2023-12-27 NOTE — PROGRESS NOTES
[facility]  Cardiac Electrophysiology  [address]  [place] [address]   Office: [phone]  Fax: [phone]     Cardiac Electrophysiology Progress Note    Patient: Sherry King   : 1960       Assessment/Recommendations   Polymorphic VT/Atrial tachycardia, new LBBB:  -Ischemic evaluation showed normal coronary arteries with a right dominant system  -Cardiac MRI revealed severe dyssynchrony secondary to left bundle branch block, LVEF of 21% with patchy fibrosis mid myocardium suggesting myocarditis, RVEF 22%, no valvular abnormalities, small pericardial effusion present  -Confirmed with Dr Resendez (interpreting provider for cardiac MRI) that fibrotic changes seen on cardiac MRI are chronic, however her CRP was normal so not likely inflammatory. Areas of fibrosis present in inferior lateral portion, not septum so should be able to proceed with placement of HIS lead tomorrow on 23  -Since medication adjustments were made, she has had no additional atrial tachycardia episodes since her CT angiogram on 2023, she does report some intermittent lightheadedness but reports reduced palpitations since medication adjustments made.      HFrEF:  -Spironolactone changed to losartan 25 mg daily  -Metoprolol tartrate changed to metoprolol XL and increase to 50 mg twice daily      Plan:   -CRT-D with left bundle pacing lead ([employer]) on 2023.    -Currently not on antiarrhythmic therapy due to her prolonged QT.  External pacing pads in place.    -Case discussed with Dr. Moreno    Physician Attestation    I, personally discussed Sherry King on 2023 as part of a shared BARB/PA visit.  I have reviewed and discussed with the advanced practice provider the patient's history, physical, and plan.    I personally reviewed the patient's vital signs, medications, and pertinent tests/laboratories.    I personally provided a substantial portion of the care for  this patient.  I personally performed the entire medical decision making and I agree with the assessment and plan as written by Karolina Martin CNP.    Key management decisions made by me:  Patient will be scheduled for placement of a CRT-D (plan for His-Purkinje system pacing)  No membrane active intermittent drug therapy at this time  Increase beta-blocker therapy    Lior Moreno MD  Date of service: 12/27/2023       Interval Events   Patient resting comfortably in bed after having a shower.  Overnight telemetry reviewed, maintaining normal sinus rhythm, ventricular rates between 70 and 90 bpm.  She has had no additional atrial tachycardia episodes since yesterday.  She reports ongoing intermittent episodes of lightheadedness with rare episodes of palpitations since medication regimen was adjusted.  Currently taking metoprolol XL twice daily, spironolactone was discontinued and she was initiated on losartan 25 mg daily.  Plan for CRT-D with left bundle pacing tomorrow, 12/28/2023.  Coronary cath showed clear coronary arteries with right dominant system.  Cardiac MRI did reveal severe to Synchrony related to her left bundle branch block with LVEF of 21% and patchy fibrosis mid myocardium indicating myocarditis, after discussion with Dr. Resendez who was the interpreting provider, majority of her myocarditis is present in the inferior lateral portion of the myocardium, her CRP was normal, he stated that her myocarditis is chronic, not acute at this time.  The RVEF is 22% she had no valve abnormalities and she did have a small pericardial effusion.  She continues to have external pacing patches in place per protocol due to her history of polymorphic VT initiated by R-on-T phenomenon and her recent cardiac event that took place on 12/24.  Recent lab values were unremarkable showing normal potassium, BMP and CBC.       Physical Examination  Review of Systems   VITALS: /61 (BP Location: Left arm)   Pulse 85   " Temp 98.4  F (36.9  C) (Oral)   Resp 18   Ht 1.778 m (5' 10\")   Wt 61.3 kg (135 lb 1.6 oz)   SpO2 100%   BMI 19.38 kg/m    Wt Readings from Last 3 Encounters:   12/27/23 61.3 kg (135 lb 1.6 oz)   12/23/23 62.1 kg (137 lb)   01/29/17 56 kg (123 lb 6.4 oz)       Intake/Output Summary (Last 24 hours) at 12/27/2023 1443  Last data filed at 12/27/2023 0741  Gross per 24 hour   Intake 240 ml   Output 2000 ml   Net -1760 ml     CONSTITUTIONAL: no distress  EYES:  Conjunctivae pink, sclerae clear.    E/N/T:  Oral mucosa pink, moist mucous membranes  RESPIRATORY:  Respiratory effort is normal, lungs CTA in all lobes  CARDIOVASCULAR:  normal S1 and S2, no murmur, rub or gallop. No BLE edema  GASTROINTESTINAL:  Abdomen without masses or tenderness  EXTREMITIES:  No clubbing or cyanosis.    MUSCULOSKELETAL:  Overall grossly normal muscle strength  SKIN:  Overall, skin warm and dry, no lesions.  NEURO/PSYCH:  Oriented x 3 with normal affect.   Constitutional:  No weight loss or loss of appetite    Cardiovascular: As detailed above  Respiratory: No cough  Genitourinary: No trouble urinating           Medical History  Surgical History   History reviewed. No pertinent past medical history. Past Surgical History:   Procedure Laterality Date    CV CORONARY ANGIOGRAM N/A 12/26/2023    Procedure: Coronary Angiogram;  Surgeon: Simon Kwon MD;  Location: [facility] CV    PICC TRIPLE LUMEN PLACEMENT  12/25/2023         Family History Social History   History reviewed. No pertinent family history.     Social History     Tobacco Use    Smoking status: Never     Passive exposure: Never    Smokeless tobacco: Never         Medications  Allergies     Current Facility-Administered Medications:     acetaminophen (TYLENOL) tablet 650 mg, 650 mg, Oral, Q4H PRN, 650 mg at 12/27/23 1228 **OR** acetaminophen (TYLENOL) Suppository 650 mg, 650 mg, Rectal, Q4H PRN, Simon Kwon MD    calcium carbonate (TUMS) chewable tablet 1,000 " mg, 1,000 mg, Oral, 4x Daily PRN, Simon Kwon MD    enoxaparin ANTICOAGULANT (LOVENOX) injection 40 mg, 40 mg, Subcutaneous, Q24H, Simon Kwon MD, 40 mg at 12/26/23 2034    HOLD:  Metformin and metformin containing medications if patient received IV contrast with acute kidney injury or severe chronic kidney disease (stage IV or stage V; i.e., eGFR less than 30), , Does not apply, HOLD, Simon Kwon MD    hydrALAZINE (APRESOLINE) injection 10 mg, 10 mg, Intravenous, Once PRN, Simon Kwon MD    Lidocaine (LIDOCARE) 4 % Patch 2 patch, 2 patch, Transdermal, Q24H, Simon Kwon MD, 2 patch at 12/27/23 0811    lidocaine (LMX4) cream, , Topical, Q1H PRN, Simon Kwon MD    lidocaine 1 % 0.1-5 mL, 0.1-5 mL, Other, Q1H PRN, Simon Kwon MD, 2 mL at 12/25/23 0849    [START ON 12/28/2023] losartan (COZAAR) tablet 25 mg, 25 mg, Oral, Daily, Tao Huggins MD    magnesium oxide (MAG-OX) half-tab 200 mg, 200 mg, Oral, Daily, Simon Kwon MD, 200 mg at 12/27/23 0811    melatonin tablet 5 mg, 5 mg, Oral, At Bedtime PRN, Simon Kwon MD    metoprolol succinate ER (TOPROL XL) 24 hr tablet 50 mg, 50 mg, Oral, BID, Tao Huggins MD    oxyCODONE (ROXICODONE) tablet 5 mg, 5 mg, Oral, Q4H PRN **OR** oxyCODONE (ROXICODONE) tablet 10 mg, 10 mg, Oral, Q4H PRN, Simon Kwon MD    senna-docusate (SENOKOT-S/PERICOLACE) 8.6-50 MG per tablet 1 tablet, 1 tablet, Oral, BID PRN **OR** senna-docusate (SENOKOT-S/PERICOLACE) 8.6-50 MG per tablet 2 tablet, 2 tablet, Oral, BID PRN, Simon Kwon MD    sodium chloride (PF) 0.9% PF flush 10-40 mL, 10-40 mL, Intracatheter, Once PRN, Simon Kwon MD    sodium chloride (PF) 0.9% PF flush 3 mL, 3 mL, Intracatheter, Q8H, Simon Kwon MD, 3 mL at 12/27/23 1229    sodium chloride (PF) 0.9% PF flush 3 mL, 3 mL, Intracatheter, q1 min prn, Simon Kwon MD    Vitamin D3 (CHOLECALCIFEROL) tablet 25 mcg, 25 mcg, Oral, Daily, Simon Kwon MD,  "25 mcg at 12/27/23 0810     Allergies   Allergen Reactions    Lanolin Rash          Lab Results    Chemistry CBC Cardiac Enzymes/BNP/TSH/INR   Recent Labs   Lab Test 12/27/23  0457      POTASSIUM 4.4   CHLORIDE 102   CO2 28   GLC 97   BUN 8.8   CR 0.59   GFRESTIMATED >90   YO 9.4     Recent Labs   Lab Test 12/27/23  0457 12/26/23  0615 12/25/23  2139   CR 0.59 0.62 0.66          Recent Labs   Lab Test 12/27/23  0457   WBC 5.6   HGB 12.9   HCT 38.9   MCV 99        Recent Labs   Lab Test 12/27/23  0457 12/26/23  0615 12/25/23  0335   HGB 12.9 12.9 14.1    No results for input(s): \"TROPONINI\" in the last 72433 hours.  Recent Labs   Lab Test 12/23/23  1757   NTBNPI 2,726*     Recent Labs   Lab Test 12/23/23  1757   TSH 7.94*     No results for input(s): \"INR\" in the last 11244 hours.       Karolina Martin NP  Clinical Cardiac Electrophysiology  [facility]  Office: [phone]  Fax: [phone]   Nurses: [phone]     37 minutes spent reviewing prior records (including documentation, laboratory studies, cardiac testing/imaging), history and physical exam, planning, and subsequent documentation.       "

## 2023-12-27 NOTE — PROGRESS NOTES
[facility]    Progress Note - Hospitalist Service       Date of Admission:  12/23/2023    Assessment & Plan   Sherry King is a 63 year old female admitted on 12/23/2023. She does not have significant past medical history and is admitted for new transient episodes of symptomatic tachycardia.     Paroxysmal atrial tachycardia, symptomatic   Polymorphic ventricular tachycardia s/p resuscitation 12/25  Initially NSR on tele strips, but continues to have episodes of symptomatic tachycardia with associated palpitations, dyspnea and dizziness. Cards initially planned for ablation but found unresponsive early AM 12/25 with sustained torsades requiring resuscitation. Achieved ROSC after about 15 min of chest compressions and one 200J asynchronized shock. Transferred to ICU for closer monitoring, but quickly stabilized and able to transfer back to floor later same day. Patient still having intermittent episodes but overall feeling better.   - cardiology following with above plan  - CRT-D pushed to tomorrow 12/28, then possible discharge   - NO antiarrhythmics given prolonged QT  - telemetry      Dilated cardiomyopathy w/ severely depressed LV systolic function, 2/2 myocarditis   LBBB, newly diagnosed  Transient symptoms starting 2 days PTA. No prior cardiac history. Endorses new PETERS, denies PND or orthopnea, no JVD or peripheral edema on exam. D-dimer 2.76, CTPE negative for PE but demonstrated mild cardiomegaly with mild interstitial edema. BNP 2700, trop 29 with stable repeat. A1c 5.1, lipids unremarkable. EKG findings initially NSR with new LBBB. Echo came back significant for severely reduced systolic function with global hypokinesis and intraventricular dyssynchrony d/t LBBB, EF 20-25%. Etiology probably nonischemic based on absence of coronary calcium. Normal CRP. CTA coronary angiogram negative, cardiac MR consistent with myocarditis, likely viral. Small pericardial effusion also seen.    - cardiology following   - transitioned to metoprolol XL 50 BID and losartan 25 daily   - could consider restarting spironolactone later if BP allows  - telemetry       Subclinical hypothyroidism  TSH 7.94 but T4 wnl.  - recheck in outpatient           Diet: Fluid restriction 2000 ML FLUID  Low Saturated Fat Na <2400 mg  NPO per Anesthesia Guidelines for Procedure/Surgery Except for: Meds    DVT Prophylaxis: Enoxaparin (Lovenox) SQ  Lombardo Catheter: Not present  Fluids: PO  Lines: PRESENT      PICC 12/25/23 Triple Lumen Right Basilic Vascular access-Site Assessment: WDL    Cardiac Monitoring: ACTIVE order. Indication: Post- PCI/Angiogram (24 hours)  Code Status: Full Code      Clinically Significant Risk Factors                   # Chronic heart failure with reduced ejection fraction: last echo with EF <40%           # Financial/Environmental Concerns: none         Disposition Plan     Expected Discharge Date: 12/27/2023                The patient's care was discussed with the Attending Physician, Dr. Aragon.     Vitaly Loyola MD  Hospitalist Service  [facility]  Securely message with [employer] (more info)  Text page via [employer] Paging/Directory   ______________________________________________________________________    Interval History   Doing well this AM.   Bummed because procedure pushed to tomorrow.     Physical Exam   Vital Signs: Temp: 98.4  F (36.9  C) Temp src: Oral BP: 101/61 Pulse: 85   Resp: 18 SpO2: 100 % O2 Device: None (Room air) Oxygen Delivery: 2 LPM  Weight: 135 lbs 1.6 oz    General Appearance: Sitting up in bed, pleasant, appears comfortable, alert, NAD.  Respiratory: No increased work of breathing. CTAB- no wheezing, crackles, rhonchi.   Cardiovascular: RRR, no murmurs. No peripheral edema.   GI: Soft, nondistended, nontender.   Skin: Dry, warm.   Other: Calm, A&Ox3.     Medical Decision Making       Please see A&P for additional details of medical decision making.       Data   ------------------------- PAST 24 HR DATA REVIEWED -----------------------------------------------

## 2023-12-27 NOTE — PROGRESS NOTES
"Care Management Follow Up    Length of Stay (days): 2    Expected Discharge Date: 12/28/2023     Concerns to be Addressed:     Care progression  Polymorphic ventricular tachycardia; CRT-D pushed to tomorrow 12/28, then possible discharge   Patient plan of care discussed at interdisciplinary rounds: Yes    Anticipated Discharge Disposition:  TBD     Anticipated Discharge Services:  TBD  Anticipated Discharge DME:  [facility]    Patient/family educated on Medicare website which has current facility and service quality ratings:  NA  Education Provided on the Discharge Plan:  Yes per team  Patient/Family in Agreement with the Plan:  NA    Referrals Placed by CM/SW:  NA  Private pay costs discussed: Not applicable    Additional Information:  Chart reviewed.    Social Hx: \"Independent with activities of daily living at baseline.  No care management needs identified at this time. Provided with contact info to arrange [facility] PCP.  Will arrange her own transport.\"    RNCM to follow for medical progression, recommendations, and final discharge plan.     Shaniqua Freedman RN     "

## 2023-12-27 NOTE — PLAN OF CARE
Problem: Dysrhythmia  Goal: Normalized Cardiac Rhythm  Outcome: Progressing     Pt remains in SR w/ BBB. Pt up ambulating in room SBA 1. NPOat [place] for ICD placement. Pt remains on RA, BP soft but MAP stable. Pt given CHG bath today. Daughters updated at bedside.

## 2023-12-27 NOTE — PROGRESS NOTES
[facility] HEART CARE   [address]  [place] [address]   [email or web address]   OFFICE: [phone]     CARDIOLOGY FOLLOW-UP NOTE      Impression and Plan     Impression:   Polymorphic ventricular tachycardia - secondary to R on T phenomenon on 12/24 with aborted cardiac arrest and successful resuscitation  LBBB - newly diagnosed, with severe LV dyssynchrony  Dilated cardiomyopathy with severely depressed LV systolic function - cardiac MRI consistent with myocarditis. Not in decompensated heart failure.  Paroxysmal atrial tachycardia    Plan:  EP service consulted. Planning on CRT-D implantation tomorrow.  Continue metoprolol at 50 mg BID but will change to metoprolol succinate as per current guidelines  Change spironolactone to losartan 25 mg daily as per current guidelines. Would consider adding spironolactone later if BP allows  Can eat today  NPO after midnight.      Primary Cardiologist: Dr. Ezra Harrison    Subjective     No new complaints. Many questions regarding MRI results and diagnosis of viral myocarditis.    Cardiac Diagnostics   Telemetry (personally reviewed): sinus rhythm with BBB.    Cardiac MRI 12/26  1.  Left ventricle is moderately enlarged with severe reduction in global systolic  function. There is severe desynchrony related to conduction abnormality (LBBB).  Normal wall thickness .  The quantified left ventricular ejection fraction is 21%.  There is areas of patchy epicardial and mid myocardial enhancement/fibrosis on LGE imaging most consistent with myocarditis. Pre-contrast T1 is abnormal and elevated at 1160 ms.  ECV: 33.6% (elevated, abnormal).   2.  Mild enlargement of right ventricle with severe reduction in systolic function.  RVEF:22%  3.  No significant valvular abnormalities.  4.  Normal right and left atrial size.   5.  There is a small pericardial effusion.    Echocardiogram (results reviewed):   TTE 12/24/23  1. Left ventricular chamber  "size is mildly enlarged. Wall thickness is normal.  Systolic function is severely reduced with global hypokinesis and intraventricular dyssynchrony due to left bundle branch block. The visually estimated left ventricular ejection fraction is 20-25%.  2. Right ventricular chamber size and systolic function are normal.  3. No hemodynamically significant valvular abnormalities.  4. No prior study available for comparison.    Cardiac Cath (results reviewed): 12/26/23  1.  Large-caliber normal-appearing epicardial coronary arteries in a right dominant system.       Physical Examination       BP 96/65 (BP Location: Left arm)   Pulse 77   Temp 97.8  F (36.6  C) (Oral)   Resp 20   Ht 1.778 m (5' 10\")   Wt 61.3 kg (135 lb 1.6 oz)   SpO2 98%   BMI 19.38 kg/m           Intake/Output Summary (Last 24 hours) at 12/27/2023 0837  Last data filed at 12/27/2023 0741  Gross per 24 hour   Intake 480 ml   Output 2200 ml   Net -1720 ml       General: pleasant female. No acute distress.   HENT: external ears normal. Nares patent. Mucous membranes moist.  Eyes: perrla, extraocular muscles intact. No scleral icterus.   Neck: No JVD  Lungs: clear to auscultation  COR: regular rate and rhythm, No murmurs, rubs, or gallops  Abd: nondistended, BS present  Extrem: No edema         Imaging      MR myocardium overread  IMPRESSION:   1. No actionable incidental extracardiac findings.   2. Please refer to cardiologist's dictation for the cardiac MRI report.     Lab Results   Lab Results   Component Value Date    CHOL 216 (H) 12/24/2023     12/24/2023    TRIG 76 12/24/2023    BUN 8.8 12/27/2023     12/27/2023    CO2 28 12/27/2023       Lab Results   Component Value Date    WBC 5.6 12/27/2023    HGB 12.9 12/27/2023    HCT 38.9 12/27/2023    MCV 99 12/27/2023     12/27/2023       Lab Results   Component Value Date    TSH 7.94 (H) 12/23/2023               Current Inpatient Scheduled Medications   Scheduled Meds:   enoxaparin " ANTICOAGULANT  40 mg Subcutaneous Q24H    lidocaine  2 patch Transdermal Q24H    magnesium oxide  200 mg Oral Daily    magnesium oxide  400 mg Oral Q4H    metoprolol tartrate  50 mg Oral BID    sodium chloride (PF)  3 mL Intracatheter Q8H    spironolactone  25 mg Oral Daily    Vitamin D3  25 mcg Oral Daily     Continuous Infusions:         Medications Prior to Admission   Prior to Admission medications    Medication Sig Start Date End Date Taking? Authorizing Provider   BIOTIN PO Take 1 tablet by mouth daily as needed   Yes Reported, Patient   Cholecalciferol (VITAMIN D-3 PO) Take 1 tablet by mouth daily as needed   Yes Reported, Patient   DENTA 5000 PLUS 1.1 % CREA daily 12/22/23  Yes Reported, Patient   MAGNESIUM GLYCINATE PO Take 1 tablet by mouth daily as needed   Yes Reported, Patient   MILK THISTLE PO Take 1 tablet by mouth daily as needed   Yes Reported, Patient   Turmeric (QC TUMERIC COMPLEX PO) Take 1 tablet by mouth daily as needed   Yes Reported, Patient

## 2023-12-28 ENCOUNTER — TRANSCRIBE ORDERS (OUTPATIENT)
Dept: CARDIOLOGY | Facility: CLINIC | Age: 63
End: 2023-12-28
Payer: COMMERCIAL

## 2023-12-28 DIAGNOSIS — Z95.810 ICD (IMPLANTABLE CARDIOVERTER-DEFIBRILLATOR) IN PLACE: Primary | ICD-10-CM

## 2023-12-28 PROBLEM — I44.7 LBBB (LEFT BUNDLE BRANCH BLOCK): Status: ACTIVE | Noted: 2023-12-28

## 2023-12-28 LAB
ABO/RH(D): NORMAL
ANION GAP SERPL CALCULATED.3IONS-SCNC: 10 MMOL/L (ref 7–15)
ANTIBODY SCREEN: NEGATIVE
BUN SERPL-MCNC: 10.7 MG/DL (ref 8–23)
CALCIUM SERPL-MCNC: 9.2 MG/DL (ref 8.8–10.2)
CHLORIDE SERPL-SCNC: 102 MMOL/L (ref 98–107)
CREAT SERPL-MCNC: 0.64 MG/DL (ref 0.51–0.95)
DEPRECATED HCO3 PLAS-SCNC: 28 MMOL/L (ref 22–29)
EGFRCR SERPLBLD CKD-EPI 2021: >90 ML/MIN/1.73M2
ERYTHROCYTE [DISTWIDTH] IN BLOOD BY AUTOMATED COUNT: 13.3 % (ref 10–15)
GLUCOSE SERPL-MCNC: 97 MG/DL (ref 70–99)
HCT VFR BLD AUTO: 37.1 % (ref 35–47)
HGB BLD-MCNC: 12.3 G/DL (ref 11.7–15.7)
MAGNESIUM SERPL-MCNC: 1.9 MG/DL (ref 1.7–2.3)
MCH RBC QN AUTO: 32.8 PG (ref 26.5–33)
MCHC RBC AUTO-ENTMCNC: 33.2 G/DL (ref 31.5–36.5)
MCV RBC AUTO: 99 FL (ref 78–100)
PLATELET # BLD AUTO: 161 10E3/UL (ref 150–450)
POTASSIUM SERPL-SCNC: 4.2 MMOL/L (ref 3.4–5.3)
RBC # BLD AUTO: 3.75 10E6/UL (ref 3.8–5.2)
SODIUM SERPL-SCNC: 140 MMOL/L (ref 135–145)
SPECIMEN EXPIRATION DATE: NORMAL
WBC # BLD AUTO: 5 10E3/UL (ref 4–11)

## 2023-12-28 PROCEDURE — C1894 INTRO/SHEATH, NON-LASER: HCPCS | Performed by: INTERNAL MEDICINE

## 2023-12-28 PROCEDURE — 250N000011 HC RX IP 250 OP 636: Performed by: INTERNAL MEDICINE

## 2023-12-28 PROCEDURE — 99152 MOD SED SAME PHYS/QHP 5/>YRS: CPT | Performed by: INTERNAL MEDICINE

## 2023-12-28 PROCEDURE — 33999 UNLISTED PX CARDIAC SURGERY: CPT | Performed by: INTERNAL MEDICINE

## 2023-12-28 PROCEDURE — 250N000013 HC RX MED GY IP 250 OP 250 PS 637: Performed by: FAMILY MEDICINE

## 2023-12-28 PROCEDURE — 250N000011 HC RX IP 250 OP 636: Performed by: NURSE PRACTITIONER

## 2023-12-28 PROCEDURE — 93005 ELECTROCARDIOGRAM TRACING: CPT

## 2023-12-28 PROCEDURE — 210N000001 HC R&B IMCU HEART CARE

## 2023-12-28 PROCEDURE — 250N000013 HC RX MED GY IP 250 OP 250 PS 637: Performed by: INTERNAL MEDICINE

## 2023-12-28 PROCEDURE — C1887 CATHETER, GUIDING: HCPCS | Performed by: INTERNAL MEDICINE

## 2023-12-28 PROCEDURE — 99153 MOD SED SAME PHYS/QHP EA: CPT | Performed by: INTERNAL MEDICINE

## 2023-12-28 PROCEDURE — 272N000001 HC OR GENERAL SUPPLY STERILE: Performed by: INTERNAL MEDICINE

## 2023-12-28 PROCEDURE — 93005 ELECTROCARDIOGRAM TRACING: CPT | Performed by: INTERNAL MEDICINE

## 2023-12-28 PROCEDURE — 83735 ASSAY OF MAGNESIUM: CPT | Performed by: FAMILY MEDICINE

## 2023-12-28 PROCEDURE — 33249 INSJ/RPLCMT DEFIB W/LEAD(S): CPT | Performed by: INTERNAL MEDICINE

## 2023-12-28 PROCEDURE — 02H63KZ INSERTION OF DEFIBRILLATOR LEAD INTO RIGHT ATRIUM, PERCUTANEOUS APPROACH: ICD-10-PCS | Performed by: INTERNAL MEDICINE

## 2023-12-28 PROCEDURE — C1898 LEAD, PMKR, OTHER THAN TRANS: HCPCS | Performed by: INTERNAL MEDICINE

## 2023-12-28 PROCEDURE — C1730 CATH, EP, 19 OR FEW ELECT: HCPCS | Performed by: INTERNAL MEDICINE

## 2023-12-28 PROCEDURE — 33249 INSJ/RPLCMT DEFIB W/LEAD(S): CPT | Mod: 22 | Performed by: INTERNAL MEDICINE

## 2023-12-28 PROCEDURE — 93010 ELECTROCARDIOGRAM REPORT: CPT | Performed by: INTERNAL MEDICINE

## 2023-12-28 PROCEDURE — 99232 SBSQ HOSP IP/OBS MODERATE 35: CPT | Mod: GC

## 2023-12-28 PROCEDURE — 02HK3KZ INSERTION OF DEFIBRILLATOR LEAD INTO RIGHT VENTRICLE, PERCUTANEOUS APPROACH: ICD-10-PCS | Performed by: INTERNAL MEDICINE

## 2023-12-28 PROCEDURE — 99232 SBSQ HOSP IP/OBS MODERATE 35: CPT | Mod: 25 | Performed by: INTERNAL MEDICINE

## 2023-12-28 PROCEDURE — 80048 BASIC METABOLIC PNL TOTAL CA: CPT | Performed by: INTERNAL MEDICINE

## 2023-12-28 PROCEDURE — C1777 LEAD, AICD, ENDO SINGLE COIL: HCPCS | Performed by: INTERNAL MEDICINE

## 2023-12-28 PROCEDURE — 0JH609Z INSERTION OF CARDIAC RESYNCHRONIZATION DEFIBRILLATOR PULSE GENERATOR INTO CHEST SUBCUTANEOUS TISSUE AND FASCIA, OPEN APPROACH: ICD-10-PCS | Performed by: INTERNAL MEDICINE

## 2023-12-28 PROCEDURE — 250N000009 HC RX 250: Performed by: INTERNAL MEDICINE

## 2023-12-28 PROCEDURE — 86900 BLOOD TYPING SEROLOGIC ABO: CPT | Performed by: NURSE PRACTITIONER

## 2023-12-28 PROCEDURE — 85027 COMPLETE CBC AUTOMATED: CPT | Performed by: INTERNAL MEDICINE

## 2023-12-28 PROCEDURE — C1882 AICD, OTHER THAN SING/DUAL: HCPCS | Performed by: INTERNAL MEDICINE

## 2023-12-28 DEVICE — IMP LEAD PACING BIPOLAR CAPSUREFIX NOVUS 52CM 5076-52: Type: IMPLANTABLE DEVICE | Site: HEART | Status: FUNCTIONAL

## 2023-12-28 DEVICE — CRT-D COBALT XT HF MRI IS1 DF4 SURESCAN DTPA2D4: Type: IMPLANTABLE DEVICE | Site: CHEST  WALL | Status: FUNCTIONAL

## 2023-12-28 DEVICE — LEAD PACEMAKER SELECTSECURE 69CM MD  383069: Type: IMPLANTABLE DEVICE | Site: HEART | Status: FUNCTIONAL

## 2023-12-28 DEVICE — LEAD ACTIVE DF4 6935M-62CM: Type: IMPLANTABLE DEVICE | Site: HEART | Status: FUNCTIONAL

## 2023-12-28 RX ORDER — DEXMEDETOMIDINE HYDROCHLORIDE 4 UG/ML
INJECTION, SOLUTION INTRAVENOUS CONTINUOUS PRN
Status: DISCONTINUED | OUTPATIENT
Start: 2023-12-28 | End: 2023-12-28 | Stop reason: HOSPADM

## 2023-12-28 RX ORDER — DEXMEDETOMIDINE HYDROCHLORIDE 4 UG/ML
.1-1.5 INJECTION, SOLUTION INTRAVENOUS CONTINUOUS
Status: DISCONTINUED | OUTPATIENT
Start: 2023-12-28 | End: 2023-12-28 | Stop reason: HOSPADM

## 2023-12-28 RX ORDER — SODIUM CHLORIDE 9 MG/ML
100 INJECTION, SOLUTION INTRAVENOUS CONTINUOUS
Status: DISCONTINUED | OUTPATIENT
Start: 2023-12-28 | End: 2023-12-28 | Stop reason: HOSPADM

## 2023-12-28 RX ORDER — OXYCODONE HYDROCHLORIDE 5 MG/1
10 TABLET ORAL EVERY 4 HOURS PRN
Status: DISCONTINUED | OUTPATIENT
Start: 2023-12-28 | End: 2023-12-29 | Stop reason: HOSPADM

## 2023-12-28 RX ORDER — ONDANSETRON 4 MG/1
8 TABLET, FILM COATED ORAL EVERY 8 HOURS PRN
Status: DISCONTINUED | OUTPATIENT
Start: 2023-12-28 | End: 2023-12-28

## 2023-12-28 RX ORDER — NALOXONE HYDROCHLORIDE 0.4 MG/ML
0.4 INJECTION, SOLUTION INTRAMUSCULAR; INTRAVENOUS; SUBCUTANEOUS
Status: DISCONTINUED | OUTPATIENT
Start: 2023-12-28 | End: 2023-12-29 | Stop reason: HOSPADM

## 2023-12-28 RX ORDER — ONDANSETRON 2 MG/ML
4 INJECTION INTRAMUSCULAR; INTRAVENOUS EVERY 6 HOURS PRN
Status: DISCONTINUED | OUTPATIENT
Start: 2023-12-28 | End: 2023-12-28

## 2023-12-28 RX ORDER — ACETAMINOPHEN 325 MG/1
650 TABLET ORAL EVERY 4 HOURS PRN
Status: DISCONTINUED | OUTPATIENT
Start: 2023-12-28 | End: 2023-12-29 | Stop reason: HOSPADM

## 2023-12-28 RX ORDER — MAGNESIUM OXIDE 400 MG/1
400 TABLET ORAL EVERY 4 HOURS
Status: COMPLETED | OUTPATIENT
Start: 2023-12-28 | End: 2023-12-28

## 2023-12-28 RX ORDER — NALOXONE HYDROCHLORIDE 0.4 MG/ML
0.2 INJECTION, SOLUTION INTRAMUSCULAR; INTRAVENOUS; SUBCUTANEOUS
Status: DISCONTINUED | OUTPATIENT
Start: 2023-12-28 | End: 2023-12-29 | Stop reason: HOSPADM

## 2023-12-28 RX ORDER — IOPAMIDOL 755 MG/ML
INJECTION, SOLUTION INTRAVASCULAR
Status: DISCONTINUED | OUTPATIENT
Start: 2023-12-28 | End: 2023-12-28 | Stop reason: HOSPADM

## 2023-12-28 RX ORDER — FENTANYL CITRATE 50 UG/ML
25 INJECTION, SOLUTION INTRAMUSCULAR; INTRAVENOUS
Status: DISCONTINUED | OUTPATIENT
Start: 2023-12-28 | End: 2023-12-28 | Stop reason: HOSPADM

## 2023-12-28 RX ORDER — FENTANYL CITRATE 50 UG/ML
INJECTION, SOLUTION INTRAMUSCULAR; INTRAVENOUS
Status: DISCONTINUED | OUTPATIENT
Start: 2023-12-28 | End: 2023-12-28 | Stop reason: HOSPADM

## 2023-12-28 RX ORDER — LIDOCAINE 40 MG/G
CREAM TOPICAL
Status: DISCONTINUED | OUTPATIENT
Start: 2023-12-28 | End: 2023-12-28 | Stop reason: HOSPADM

## 2023-12-28 RX ORDER — OXYCODONE HYDROCHLORIDE 5 MG/1
5 TABLET ORAL EVERY 4 HOURS PRN
Status: DISCONTINUED | OUTPATIENT
Start: 2023-12-28 | End: 2023-12-29 | Stop reason: HOSPADM

## 2023-12-28 RX ORDER — CEFAZOLIN SODIUM/WATER 2 G/20 ML
2 SYRINGE (ML) INTRAVENOUS
Status: DISCONTINUED | OUTPATIENT
Start: 2023-12-28 | End: 2023-12-28 | Stop reason: HOSPADM

## 2023-12-28 RX ADMIN — LIDOCAINE 2 PATCH: 4 PATCH TOPICAL at 09:14

## 2023-12-28 RX ADMIN — LOSARTAN POTASSIUM 25 MG: 25 TABLET, FILM COATED ORAL at 09:13

## 2023-12-28 RX ADMIN — Medication 25 MCG: at 09:13

## 2023-12-28 RX ADMIN — MAGNESIUM OXIDE TAB 400 MG (241.3 MG ELEMENTAL MG) 400 MG: 400 (241.3 MG) TAB at 12:59

## 2023-12-28 RX ADMIN — Medication 2 G: at 15:46

## 2023-12-28 RX ADMIN — ACETAMINOPHEN 650 MG: 325 TABLET ORAL at 09:28

## 2023-12-28 RX ADMIN — METOPROLOL SUCCINATE 50 MG: 50 TABLET, EXTENDED RELEASE ORAL at 09:13

## 2023-12-28 RX ADMIN — Medication 1000 MG: at 18:27

## 2023-12-28 RX ADMIN — MAGNESIUM OXIDE TAB 400 MG (241.3 MG ELEMENTAL MG) 400 MG: 400 (241.3 MG) TAB at 09:13

## 2023-12-28 RX ADMIN — MAGNESIUM OXIDE TAB 400 MG (241.3 MG ELEMENTAL MG) 200 MG: 400 (241.3 MG) TAB at 09:14

## 2023-12-28 RX ADMIN — ACETAMINOPHEN 650 MG: 325 TABLET ORAL at 21:17

## 2023-12-28 RX ADMIN — METOPROLOL SUCCINATE 50 MG: 50 TABLET, EXTENDED RELEASE ORAL at 21:17

## 2023-12-28 RX ADMIN — OXYCODONE HYDROCHLORIDE 5 MG: 5 TABLET ORAL at 21:17

## 2023-12-28 ASSESSMENT — ACTIVITIES OF DAILY LIVING (ADL)
ADLS_ACUITY_SCORE: 20

## 2023-12-28 NOTE — PLAN OF CARE
"  Problem: Adult Inpatient Plan of Care  Goal: Plan of Care Review  Description: The Plan of Care Review/Shift note should be completed every shift.  The Outcome Evaluation is a brief statement about your assessment that the patient is improving, declining, or no change.  This information will be displayed automatically on your shift  note.  Outcome: Progressing  Flowsheets (Taken 12/27/2023 2301)  Plan of Care Reviewed With:   patient   family  Goal: Patient-Specific Goal (Individualized)  Description: You can add care plan individualizations to a care plan. Examples of Individualization might be:  \"Parent requests to be called daily at 9am for status\", \"I have a hard time hearing out of my right ear\", or \"Do not touch me to wake me up as it startles  me\".  Outcome: Progressing  Goal: Absence of Hospital-Acquired Illness or Injury  Outcome: Progressing  Intervention: Identify and Manage Fall Risk  Recent Flowsheet Documentation  Taken 12/27/2023 1600 by Theodore Hauser, RN  Safety Promotion/Fall Prevention:   activity supervised   clutter free environment maintained   lighting adjusted   nonskid shoes/slippers when out of bed   safety round/check completed   supervised activity  Intervention: Prevent Infection  Recent Flowsheet Documentation  Taken 12/27/2023 1600 by Theodore Hauser, RN  Infection Prevention:   cohorting utilized   environmental surveillance performed   equipment surfaces disinfected   hand hygiene promoted   personal protective equipment utilized   rest/sleep promoted   single patient room provided  Goal: Optimal Comfort and Wellbeing  Outcome: Progressing  Intervention: Monitor Pain and Promote Comfort  Recent Flowsheet Documentation  Taken 12/27/2023 2000 by Theodore Hauser, RN  Pain Management Interventions: medication (see MAR)  Taken 12/27/2023 1600 by Theodore Hauser, RN  Pain Management Interventions: heat applied  Intervention: Provide Person-Centered Care  Recent Flowsheet " Documentation  Taken 12/27/2023 1600 by Theodore Hauser RN  Trust Relationship/Rapport:   care explained   reassurance provided   thoughts/feelings acknowledged  Goal: Readiness for Transition of Care  Outcome: Progressing     Problem: Acute Coronary Syndrome  Goal: Optimal Adaptation to Illness  Outcome: Progressing  Intervention: Support Adjustment to Life-Change Event  Recent Flowsheet Documentation  Taken 12/27/2023 1600 by Theodore Hauser RN  Family/Support System Care:   caregiver stress acknowledged   involvement promoted   self-care encouraged  Goal: Absence of Cardiac-Related Pain  Outcome: Progressing  Goal: Normalized Cardiac Rhythm  Outcome: Progressing  Goal: Effective Cardiac Pump Function  Outcome: Progressing  Goal: Adequate Tissue Perfusion  Outcome: Progressing     Problem: Dysrhythmia  Goal: Normalized Cardiac Rhythm  Outcome: Progressing    Goal Outcome Evaluation:      Plan of Care Reviewed With: patient, family    Pt A/O x 4, neuro intact. Vitally stable this shift & saturating well on RA - lung sounds clear & pt denies SOB. Tele continues to be sinus rhythm with BBB. Pacer pads in place per MD order. Pt up with SBA. On schedule & prepped for ICD placement tomorrow AM. Lovenox injection held per order. Pt denies pain, dizziness, or lightheadedness. K & Mg to be rechecked in the AM. NPO at midnight. Writer showed pt educational videos on ICD placement which she appreciated. Will continue to monitor.      Theodore Hauser RN on 12/27/2023 at 11:05 PM

## 2023-12-28 NOTE — PLAN OF CARE
Problem: Adult Inpatient Plan of Care  Goal: Optimal Comfort and Wellbeing  Outcome: Progressing     Problem: Dysrhythmia  Goal: Normalized Cardiac Rhythm  Outcome: Progressing     Problem: Acute Coronary Syndrome  Goal: Adequate Tissue Perfusion  Outcome: Progressing   Goal Outcome Evaluation:       NSR. No complaints of pain or SOB. Was NPO starting at midnight for CRT-D procedure today. No questions at this time. Prepped on the previous shift. K and Mg protocol ran, will need to replace mag x2 and recheck both in AM. VSS.

## 2023-12-28 NOTE — PROGRESS NOTES
[facility] HEART CARE   [address]  [place] [address]   [email or web address]   OFFICE: [phone]     CARDIOLOGY FOLLOW-UP NOTE      Impression and Plan     Impression:   Polymorphic ventricular tachycardia - secondary to R on T phenomenon on 12/24 with aborted cardiac arrest and successful resuscitation  LBBB - newly diagnosed, with severe LV dyssynchrony  Dilated cardiomyopathy with severely depressed LV systolic function - cardiac MRI consistent with myocarditis. Not in decompensated heart failure.  Paroxysmal atrial tachycardia    Plan:  Planning on ICD implantation today.   Continue metoprolol succinate and losartan  Likely discharge tomorrow provided no issues with ICD implant.    Discussed with SHELLEY Cooper on EP service    Primary Cardiologist: Dr. Ezra Harrison    Subjective     Feeling well. No new complaints. Awaiting ICD implantation    Cardiac Diagnostics   Telemetry (personally reviewed): sinus rhythm with BBB.    Cardiac MRI 12/26  1.  Left ventricle is moderately enlarged with severe reduction in global systolic  function. There is severe desynchrony related to conduction abnormality (LBBB).  Normal wall thickness .  The quantified left ventricular ejection fraction is 21%.  There is areas of patchy epicardial and mid myocardial enhancement/fibrosis on LGE imaging most consistent with myocarditis. Pre-contrast T1 is abnormal and elevated at 1160 ms.  ECV: 33.6% (elevated, abnormal).   2.  Mild enlargement of right ventricle with severe reduction in systolic function.  RVEF:22%  3.  No significant valvular abnormalities.  4.  Normal right and left atrial size.   5.  There is a small pericardial effusion.    Echocardiogram (results reviewed):   TTE 12/24/23  1. Left ventricular chamber size is mildly enlarged. Wall thickness is normal.  Systolic function is severely reduced with global hypokinesis and intraventricular dyssynchrony due to left  "bundle branch block. The visually estimated left ventricular ejection fraction is 20-25%.  2. Right ventricular chamber size and systolic function are normal.  3. No hemodynamically significant valvular abnormalities.  4. No prior study available for comparison.    Cardiac Cath (results reviewed): 12/26/23  1.  Large-caliber normal-appearing epicardial coronary arteries in a right dominant system.       Physical Examination       /72 (BP Location: Left arm)   Pulse 88   Temp 97.7  F (36.5  C) (Oral)   Resp 20   Ht 1.778 m (5' 10\")   Wt 61.1 kg (134 lb 9.6 oz)   SpO2 99%   BMI 19.31 kg/m           Intake/Output Summary (Last 24 hours) at 12/27/2023 0837  Last data filed at 12/27/2023 0741  Gross per 24 hour   Intake 480 ml   Output 2200 ml   Net -1720 ml       General: pleasant female. No acute distress.   HENT: external ears normal. Nares patent. Mucous membranes moist.  Eyes: perrla, extraocular muscles intact. No scleral icterus.   Neck: No JVD  Lungs: clear to auscultation  COR: regular rate and rhythm, No murmurs, rubs, or gallops  Abd: nondistended, BS present  Extrem: No edema         Imaging      MR myocardium overread  IMPRESSION:   1. No actionable incidental extracardiac findings.   2. Please refer to cardiologist's dictation for the cardiac MRI report.     Lab Results   Lab Results   Component Value Date    CHOL 216 (H) 12/24/2023     12/24/2023    TRIG 76 12/24/2023    BUN 10.7 12/28/2023     12/28/2023    CO2 28 12/28/2023       Lab Results   Component Value Date    WBC 5.0 12/28/2023    HGB 12.3 12/28/2023    HCT 37.1 12/28/2023    MCV 99 12/28/2023     12/28/2023       Lab Results   Component Value Date    TSH 7.94 (H) 12/23/2023               Current Inpatient Scheduled Medications   Scheduled Meds:   enoxaparin ANTICOAGULANT  40 mg Subcutaneous Q24H    lidocaine  2 patch Transdermal Q24H    losartan  25 mg Oral Daily    magnesium oxide  200 mg Oral Daily    magnesium " oxide  400 mg Oral Q4H    metoprolol succinate ER  50 mg Oral BID    sodium chloride (PF)  3 mL Intracatheter Q8H    Vitamin D3  25 mcg Oral Daily     Continuous Infusions:         Medications Prior to Admission   Prior to Admission medications    Medication Sig Start Date End Date Taking? Authorizing Provider   BIOTIN PO Take 1 tablet by mouth daily as needed   Yes Reported, Patient   Cholecalciferol (VITAMIN D-3 PO) Take 1 tablet by mouth daily as needed   Yes Reported, Patient   DENTA 5000 PLUS 1.1 % CREA daily 12/22/23  Yes Reported, Patient   MAGNESIUM GLYCINATE PO Take 1 tablet by mouth daily as needed   Yes Reported, Patient   MILK THISTLE PO Take 1 tablet by mouth daily as needed   Yes Reported, Patient   Turmeric (QC TUMERIC COMPLEX PO) Take 1 tablet by mouth daily as needed   Yes Reported, Patient

## 2023-12-28 NOTE — PROGRESS NOTES
[facility]    Progress Note - Hospitalist Service       Date of Admission:  12/23/2023    Assessment & Plan   Sherry King is a 63 year old female admitted on 12/23/2023. She does not have significant past medical history and is admitted for new paroxysmal symptomatic tachycardia.     Paroxysmal atrial tachycardia, symptomatic   Polymorphic ventricular tachycardia s/p resuscitation 12/25  Initially NSR on tele strips, but then continued to have episodes of symptomatic tachycardia with associated palpitations, dyspnea and dizziness. Cards initially planned for ablation but found unresponsive early AM 12/25 with sustained torsades requiring resuscitation. Achieved ROSC after about 15 min of chest compressions and one 200J asynchronized shock. Transferred to ICU for closer monitoring, but quickly stabilized and transferred back to floor same day. Patient still having intermittent episodes but much less frequent and less symptoms.   - cardiology following  - CRT-D later today, possible discharge thereafter?   - NO antiarrhythmics given prolonged QT  - telemetry      Dilated cardiomyopathy w/ severely depressed LV systolic function, 2/2 myocarditis   LBBB, newly diagnosed  Transient symptoms starting 2 days PTA. No prior cardiac history. Endorses new PETERS, denies PND or orthopnea, no JVD or peripheral edema. D-dimer 2.76, CTPE negative for PE but demonstrated mild cardiomegaly with mild interstitial edema. BNP 2700, trop 29 with stable repeat. A1c 5.1, lipids unremarkable. EKG findings initially NSR with new LBBB. Echo came back with severely reduced systolic function with global hypokinesis and intraventricular dyssynchrony d/t LBBB, EF 20-25%. Normal CRP. CTA coronary angiogram negative, cardiac MR consistent with myocarditis, likely viral. Small pericardial effusion also seen.   - cardiology following   - metoprolol XL 50 BID and losartan 25 daily   - could consider restarting  spironolactone later if BP allows  - telemetry       Subclinical hypothyroidism  TSH 7.94 but T4 wnl.  - recheck in outpatient           Diet: Fluid restriction 2000 ML FLUID  NPO per Anesthesia Guidelines for Procedure/Surgery Except for: Meds    DVT Prophylaxis: Enoxaparin (Lovenox) SQ  Lombardo Catheter: Not present  Fluids: PO  Lines: PRESENT      PICC 12/25/23 Triple Lumen Right Basilic Vascular access-Site Assessment: WDL    Cardiac Monitoring: ACTIVE order. Indication: Tachyarrhythmias, acute (48 hours)  Code Status: Full Code      Clinically Significant Risk Factors                   # Chronic heart failure with reduced ejection fraction: last echo with EF <40%           # Financial/Environmental Concerns: none         Disposition Plan      Expected Discharge Date: 12/29/2023        Discharge Comments: CRT-D implantation 12/28        The patient's care was discussed with the Attending Physician, Dr. Aragon.     Vitaly Loyola MD  Hospitalist Service  [facility]  Securely message with [employer] (more info)  Text page via [employer] Paging/Directory   ______________________________________________________________________    Interval History   Doing well this AM. Denies symptoms overnight.   Ready to get procedure over with, wanting to leave ASAP.     Physical Exam   Vital Signs: Temp: 98.4  F (36.9  C) Temp src: Oral BP: 108/72 Pulse: 88   Resp: 20 SpO2: 99 % O2 Device: None (Room air)    Weight: 134 lbs 9.6 oz    General Appearance: Sitting up in bed, pleasant, appears comfortable, alert, NAD.  Respiratory: No increased work of breathing. CTAB- no wheezing, crackles, rhonchi.   Cardiovascular: RRR, no murmurs. No peripheral edema.   GI: Soft, nondistended, nontender.   Skin: Dry, warm.   Other: Calm, A&Ox3.     Medical Decision Making       Please see A&P for additional details of medical decision making.      Data   ------------------------- PAST 24 HR DATA REVIEWED  -----------------------------------------------

## 2023-12-29 ENCOUNTER — APPOINTMENT (OUTPATIENT)
Dept: RADIOLOGY | Facility: HOSPITAL | Age: 63
End: 2023-12-29
Attending: INTERNAL MEDICINE
Payer: COMMERCIAL

## 2023-12-29 ENCOUNTER — APPOINTMENT (OUTPATIENT)
Dept: OCCUPATIONAL THERAPY | Facility: HOSPITAL | Age: 63
End: 2023-12-29
Attending: INTERNAL MEDICINE
Payer: COMMERCIAL

## 2023-12-29 VITALS
HEART RATE: 91 BPM | WEIGHT: 136 LBS | RESPIRATION RATE: 18 BRPM | DIASTOLIC BLOOD PRESSURE: 78 MMHG | OXYGEN SATURATION: 100 % | BODY MASS INDEX: 19.47 KG/M2 | HEIGHT: 70 IN | SYSTOLIC BLOOD PRESSURE: 118 MMHG | TEMPERATURE: 97.8 F

## 2023-12-29 PROBLEM — Z95.810 CARDIAC RESYNCHRONIZATION THERAPY DEFIBRILLATOR (CRT-D) IN PLACE: Status: ACTIVE | Noted: 2023-12-29

## 2023-12-29 LAB
ANION GAP SERPL CALCULATED.3IONS-SCNC: 10 MMOL/L (ref 7–15)
BUN SERPL-MCNC: 10.9 MG/DL (ref 8–23)
CALCIUM SERPL-MCNC: 9 MG/DL (ref 8.8–10.2)
CHLORIDE SERPL-SCNC: 103 MMOL/L (ref 98–107)
CREAT SERPL-MCNC: 0.6 MG/DL (ref 0.51–0.95)
DEPRECATED HCO3 PLAS-SCNC: 27 MMOL/L (ref 22–29)
EGFRCR SERPLBLD CKD-EPI 2021: >90 ML/MIN/1.73M2
ERYTHROCYTE [DISTWIDTH] IN BLOOD BY AUTOMATED COUNT: 13.1 % (ref 10–15)
GLUCOSE SERPL-MCNC: 86 MG/DL (ref 70–99)
HCT VFR BLD AUTO: 36.3 % (ref 35–47)
HGB BLD-MCNC: 12 G/DL (ref 11.7–15.7)
MAGNESIUM SERPL-MCNC: 1.9 MG/DL (ref 1.7–2.3)
MCH RBC QN AUTO: 32.7 PG (ref 26.5–33)
MCHC RBC AUTO-ENTMCNC: 33.1 G/DL (ref 31.5–36.5)
MCV RBC AUTO: 99 FL (ref 78–100)
PLATELET # BLD AUTO: 140 10E3/UL (ref 150–450)
POTASSIUM SERPL-SCNC: 4.3 MMOL/L (ref 3.4–5.3)
RBC # BLD AUTO: 3.67 10E6/UL (ref 3.8–5.2)
SODIUM SERPL-SCNC: 140 MMOL/L (ref 135–145)
WBC # BLD AUTO: 5.4 10E3/UL (ref 4–11)

## 2023-12-29 PROCEDURE — 83735 ASSAY OF MAGNESIUM: CPT | Performed by: FAMILY MEDICINE

## 2023-12-29 PROCEDURE — 85027 COMPLETE CBC AUTOMATED: CPT | Performed by: INTERNAL MEDICINE

## 2023-12-29 PROCEDURE — 97535 SELF CARE MNGMENT TRAINING: CPT | Mod: GO

## 2023-12-29 PROCEDURE — 250N000013 HC RX MED GY IP 250 OP 250 PS 637: Performed by: INTERNAL MEDICINE

## 2023-12-29 PROCEDURE — 99238 HOSP IP/OBS DSCHRG MGMT 30/<: CPT | Mod: GC

## 2023-12-29 PROCEDURE — 99232 SBSQ HOSP IP/OBS MODERATE 35: CPT | Mod: 24 | Performed by: INTERNAL MEDICINE

## 2023-12-29 PROCEDURE — 97110 THERAPEUTIC EXERCISES: CPT | Mod: GO

## 2023-12-29 PROCEDURE — 97165 OT EVAL LOW COMPLEX 30 MIN: CPT | Mod: GO

## 2023-12-29 PROCEDURE — 999N000065 XR CHEST 2 VIEWS

## 2023-12-29 PROCEDURE — 80048 BASIC METABOLIC PNL TOTAL CA: CPT | Performed by: FAMILY MEDICINE

## 2023-12-29 PROCEDURE — 250N000013 HC RX MED GY IP 250 OP 250 PS 637: Performed by: STUDENT IN AN ORGANIZED HEALTH CARE EDUCATION/TRAINING PROGRAM

## 2023-12-29 PROCEDURE — 99024 POSTOP FOLLOW-UP VISIT: CPT | Performed by: NURSE PRACTITIONER

## 2023-12-29 RX ORDER — LOSARTAN POTASSIUM 25 MG/1
25 TABLET ORAL DAILY
Qty: 30 TABLET | Refills: 0 | Status: SHIPPED | OUTPATIENT
Start: 2023-12-30 | End: 2024-02-08

## 2023-12-29 RX ORDER — MAGNESIUM OXIDE 400 MG/1
400 TABLET ORAL EVERY 4 HOURS
Status: COMPLETED | OUTPATIENT
Start: 2023-12-29 | End: 2023-12-29

## 2023-12-29 RX ORDER — LOSARTAN POTASSIUM 25 MG/1
25 TABLET ORAL DAILY
Qty: 30 TABLET | Refills: 0 | Status: SHIPPED | OUTPATIENT
Start: 2023-12-30 | End: 2023-12-29

## 2023-12-29 RX ORDER — METOPROLOL SUCCINATE 50 MG/1
50 TABLET, EXTENDED RELEASE ORAL 2 TIMES DAILY
Qty: 60 TABLET | Refills: 0 | Status: SHIPPED | OUTPATIENT
Start: 2023-12-29 | End: 2024-02-08

## 2023-12-29 RX ORDER — METOPROLOL SUCCINATE 50 MG/1
50 TABLET, EXTENDED RELEASE ORAL 2 TIMES DAILY
Qty: 60 TABLET | Refills: 0 | Status: SHIPPED | OUTPATIENT
Start: 2023-12-29 | End: 2023-12-29

## 2023-12-29 RX ADMIN — ACETAMINOPHEN 650 MG: 325 TABLET ORAL at 04:10

## 2023-12-29 RX ADMIN — METOPROLOL SUCCINATE 50 MG: 50 TABLET, EXTENDED RELEASE ORAL at 08:19

## 2023-12-29 RX ADMIN — ACETAMINOPHEN 650 MG: 325 TABLET ORAL at 08:25

## 2023-12-29 RX ADMIN — LIDOCAINE 2 PATCH: 4 PATCH TOPICAL at 08:17

## 2023-12-29 RX ADMIN — MAGNESIUM OXIDE TAB 400 MG (241.3 MG ELEMENTAL MG) 200 MG: 400 (241.3 MG) TAB at 08:20

## 2023-12-29 RX ADMIN — Medication 25 MCG: at 08:20

## 2023-12-29 RX ADMIN — MAGNESIUM OXIDE TAB 400 MG (241.3 MG ELEMENTAL MG) 400 MG: 400 (241.3 MG) TAB at 09:20

## 2023-12-29 RX ADMIN — MAGNESIUM OXIDE TAB 400 MG (241.3 MG ELEMENTAL MG) 400 MG: 400 (241.3 MG) TAB at 11:20

## 2023-12-29 RX ADMIN — OXYCODONE HYDROCHLORIDE 5 MG: 5 TABLET ORAL at 04:10

## 2023-12-29 RX ADMIN — LOSARTAN POTASSIUM 25 MG: 25 TABLET, FILM COATED ORAL at 08:19

## 2023-12-29 ASSESSMENT — ACTIVITIES OF DAILY LIVING (ADL)
ADLS_ACUITY_SCORE: 20
ADLS_ACUITY_SCORE: 24
ADLS_ACUITY_SCORE: 20
ADLS_ACUITY_SCORE: 22
ADLS_ACUITY_SCORE: 22
ADLS_ACUITY_SCORE: 20

## 2023-12-29 NOTE — PROGRESS NOTES
Brief EP Progress Note:    Sherry King is a 63 year old female recently admitted with aborted cardiac arrest secondary to polymorphic ventricular tachycardia.  Workup demonstrates a chronic myocarditis with nonischemic cardiomyopathy (LVEF 21%).  Newly diagnosed left bundle branch block.  The patient has NYHA class 2 heart failure. The patient has been referred for insertion of a CRT-D defibrillator system to provide secondary prevention protection against recurrent malignant ventricular arrhythmia and treat her underlying cardiomyopathy.   Aborted cardiac arrest, chronic idiopathic (myocarditis) cardiomyopathy, and left bundle branch block.  Successful peripheral venogram (left subclavian) which demonstrated a slightly more proximal course of the vein over the first rib with free flow of contrast to the innominate and SVC systems  Successful implant of a CRT ICD system.  Note the cardiac resynchronization pacing lead was placed at the His-Purkinje pacing site.  Normal acute lead function   Normal acute lead/generator performance   No evidence of acute complication   Implanted by Dr. Moreno on 12/28/23    Left  pre-pectoral device site is WNL. Dressing is C/D/I. No surrounding ecchymosis or hematoma.         Patient teaching was provided to the patient which included activity restrictions and reportable signs and symptoms. Patient was receptive to teaching and denies needs prior to discharge. Patient was provided with a temporary device card and owner's manual by device rep Kevin Ruiz    CXR   EXAM: XR CHEST 2 VIEWS  LOCATION: [facility]  DATE: 12/29/2023     INDICATION: Continue CRT ICD (pacing lead at the His Purkinje system site)  COMPARISON: Chest CT 12/23/2023                                                                      IMPRESSION: Left subclavian approach three lead pacer/ICD, with lead tips over the expected regions of the right atrium and right ventricle (one lead is  presumed at the His site, as listed in the indication). No pneumothorax. Small pleural effusions.   Mild basilar atelectasis. Normal heart size.            DEVICE REPORT 12/29/23:  Reviewed- sent electronically by device rep Kevin Ruiz   Normal device function, stable lead measurements. No events.         PLAN:  OK to discharge from an EP standpoint when medically stable from a heart failure perspective- general cardiology is following.   Hospitalist consult for non-cardiac medical issues  NO heparin for 72 hours post implant  NO reaching or lifting with the left upper extremity above the shoulder for 4 weeks post implant  OK to remove surgical dressing on day 3 post op . Continue to keep incision clean and dry for a total of 7 days. Avoid traditional showers, tub baths, swimming pools or hot tubs. Do not apply lotions, ointments or new bandages to the site. Observe for s/s of infection; warmth, redness, or drainage.   NO dental work for 6 weeks following device implant  We look forward to seeing you  in device clinic on 1/5/2024  at 10 AM at [facility].   Thank you for the opportunity to participate in the care of this patient  Please call if further EP issues arise [phone]    Karolina Martin NP  Clinical Cardiac Electrophysiology  [facility] [place] [facility]  Office: [phone]  Fax: [phone]  EP Nurses: [phone]

## 2023-12-29 NOTE — PLAN OF CARE
Assumed care 1900 to 0730. A&O x 4. Stand by assist. Tele is NSR/A-paced. C/O surgical incision pain in LUE. PRN Tylenol and Oxycodone given (see MAR). Ice applied to surgical site. Up to toilet. Call light within reach, able to make needs known. Bed alarm on for safety.    Problem: Dysrhythmia  Goal: Normalized Cardiac Rhythm  Outcome: Progressing     Problem: Acute Coronary Syndrome  Goal: Adequate Tissue Perfusion  Intervention: Optimize Cardiac Tissue Perfusion  Recent Flowsheet Documentation  Taken 12/29/2023 0410 by ОЛЬГА WAGNER  Activity Management: activity adjusted per tolerance  Environmental Support: calm environment promoted  Taken 12/29/2023 0000 by ОЛЬГА WAGNER  Activity Management: activity adjusted per tolerance  Environmental Support: calm environment promoted  Taken 12/28/2023 2117 by ОЛЬГА WAGNER  Activity Management: activity adjusted per tolerance  Environmental Support: calm environment promoted

## 2023-12-29 NOTE — PLAN OF CARE
"  Problem: Adult Inpatient Plan of Care  Goal: Plan of Care Review  Description: The Plan of Care Review/Shift note should be completed every shift.  The Outcome Evaluation is a brief statement about your assessment that the patient is improving, declining, or no change.  This information will be displayed automatically on your shift  note.  Outcome: Progressing  Flowsheets (Taken 12/29/2023 0843)  Plan of Care Reviewed With:   patient   family  Overall Patient Progress: improving  Goal: Patient-Specific Goal (Individualized)  Description: You can add care plan individualizations to a care plan. Examples of Individualization might be:  \"Parent requests to be called daily at 9am for status\", \"I have a hard time hearing out of my right ear\", or \"Do not touch me to wake me up as it startles  me\".  Outcome: Progressing  Goal: Absence of Hospital-Acquired Illness or Injury  Outcome: Progressing  Intervention: Identify and Manage Fall Risk  Recent Flowsheet Documentation  Taken 12/29/2023 0825 by Joy Galeas RN  Safety Promotion/Fall Prevention:   supervised activity   clutter free environment maintained  Goal: Optimal Comfort and Wellbeing  Outcome: Progressing  Intervention: Monitor Pain and Promote Comfort  Recent Flowsheet Documentation  Taken 12/29/2023 0825 by Joy Galeas RN  Pain Management Interventions: medication (see MAR)  Intervention: Provide Person-Centered Care  Recent Flowsheet Documentation  Taken 12/29/2023 0825 by Joy Galeas RN  Trust Relationship/Rapport:   care explained   reassurance provided   thoughts/feelings acknowledged  Goal: Readiness for Transition of Care  Outcome: Progressing     Problem: Acute Coronary Syndrome  Goal: Optimal Adaptation to Illness  Outcome: Progressing  Goal: Absence of Cardiac-Related Pain  Outcome: Progressing  Goal: Normalized Cardiac Rhythm  Outcome: Progressing  Goal: Effective Cardiac Pump Function  Outcome: Progressing  Goal: Adequate Tissue Perfusion  Outcome: " Progressing     Problem: Dysrhythmia  Goal: Normalized Cardiac Rhythm  Outcome: Progressing     Goal Outcome Evaluation:      Plan of Care Reviewed With: patient, family    Overall Patient Progress: improving    0800 - Pt. A&O x4 and on room air. Has sore pain where pacemaker/ICD was placed. Gave Tylenol (see MAR). Pacemaker site WDL; no bleeding, bruising or hematoma. Tele is NSR. Daughters at bedside. Pt. Independent. Call light within reach.     1140 - Discharge paperwork given and explained to pt. Pacemaker/ICD box given and explained the purpose. Went through pacemaker/ICD education and phone number. All belongings returned. All questions answered.

## 2023-12-29 NOTE — PROGRESS NOTES
Cardiac rehab      12/29/23 3630   Appointment Info   Signing Clinician's Name / Credentials (OT) Theresa Leslie OTR/L   Living Environment   People in Home sibling(s)  (adult sister)   Current Living Arrangements house   Home Accessibility stairs within home   Number of Stairs, Within Home, Primary greater than 10 stairs  (multi level home)   Stair Railings, Within Home, Primary railings safe and in good condition   Transportation Anticipated family or friend will provide   Living Environment Comments W/I shower no concerns   Self-Care   Usual Activity Tolerance good   Current Activity Tolerance good   Regular Exercise No   Equipment Currently Used at Home none   Fall history within last six months no   Activity/Exercise/Self-Care Comment Ind. w/ ADL routine   Instrumental Activities of Daily Living (IADL)   IADL Comments Ind. w/ IADL routine- pt reporting sister could assist w/ heavier IADL tasks as needed.   General Information   Onset of Illness/Injury or Date of Surgery 12/23/23   Referring Physician Rosenstein, Benjamin, MD   Additional Occupational Profile Info/Pertinent History of Current Problem 63 year old female admitted on 12/23/2023. She does not have significant past medical history and is admitted for new paroxysmal symptomatic tachycardia. s/p ICD/pacer implantation   Existing Precautions/Restrictions pacemaker;cardiac   Cognitive Status Examination   Orientation Status orientation to person, place and time   Range of Motion Comprehensive   Comment, General Range of Motion LUE not tested d/t pacer prec.   Bed Mobility   Bed Mobility No deficits identified   Transfers   Transfers sit-stand transfer   Sit-Stand Transfer   Sit-Stand [place] (Transfers) modified independence   Balance   Balance Assessment standing dynamic balance   Standing Balance: Dynamic modified independence   Activities of Daily Living   BADL Assessment/Intervention lower body dressing   Lower Body Dressing Assessment/Training    Position (Lower Body Dressing) unsupported sitting   [place] Level (Lower Body Dressing) modified independence   Clinical Impression   Criteria for Skilled Therapeutic Interventions Met (OT) Yes, treatment indicated   OT Diagnosis decreased ADL ind.   OT Problem List-Impairments impacting ADL problems related to;activity tolerance impaired;balance;post-surgical precautions;mobility   Assessment of Occupational Performance 1-3 Performance Deficits   Identified Performance Deficits endurance/act. tolerance   Planned Therapy Interventions (OT) ADL retraining;balance training;ROM;strengthening;transfer training   Clinical Decision Making Complexity (OT) problem focused assessment/low complexity   Risk & Benefits of therapy have been explained evaluation/treatment results reviewed;patient   OT Total Evaluation Time   OT Eval, Low Complexity Minutes (80422) 12   OT Goals   Therapy Frequency (OT) One time eval and treatment   OT Predicted Duration/Target Date for Goal Attainment 12/29/23   OT Goals Cardiac Phase 1   OT: Understanding of cardiac education to maximize quality of life, condition management, and health outcomes Patient;Verbalize;Demonstrate   OT: Perform aerobic activity with stable cardiovascular response continuous;15 minutes   OT: Functional/aerobic ambulation tolerance with stable cardiovascular response in order to return to home and community environment Modified independent;Greater than 300 feet   OT: Navigation of stairs simulating home set up with stable cardiovascular response in order to return to home and community environment Modified independent;Greater than 10 stairs   Self-Care/Home Management   Self-Care/Home Mgmt/ADL, Compensatory, Meal Prep Minutes (73958) 8   Treatment Detail/Skilled Intervention see cardiac ed. tab pt provided ed on Pacer prec., pt ind. w/ bed mobility, Mod.I STS, pt required assist for LB dressing but family will be able to assist.   Therapeutic Procedures/Exercise    Therapeutic Procedure: strength, endurance, ROM, flexibillity minutes (09902) 10   Symptoms Noted During/After Treatment fatigue   Treatment Detail/Skilled Intervention see ambulation/stair tab   Treatment Time Includes (CR Only) See specific exercise details intervention group(s)   Ambulation   Workload 400ft   Symptoms Denies symptoms   Cardiovascular Response Normal   Exercise Details Mod.I w/o device   Vital Signs Details vitals pre/post WFL no c/o   Stairs   Workload 14   Symptoms Denies symptoms   Cardiovascular Response Normal   Exercise Details Mod.I no LOB no use of LUE   Vital Signs Details pre/post WFL   Cardiac Education   Education Provided Emergency plan;Home exercise program;Outpatient Cardiac Rehab;Precautions;Resuming home activities   Education Packet Given to Patient Yes   All Patient Education Handouts Reviewed with Patient and/or Family Yes   Cardiac Rehab Phase II Plan   Phase II Order Received No   Phase II Appointment Status Not scheduled   Not Scheduled Reason Other (see comments)  (order placed)   OT Discharge Planning   OT Plan dc OT   OT Discharge Recommendation (DC Rec) home with outpatient cardiac rehab;home with assist   OT Rationale for DC Rec Pt moving well w/o device no LOB- no c/o symptoms w/ activity. Pt lives in supportive home environment w/ sister- pt daughters able to assist as needed upon d/c   Total Session Time   Timed Code Treatment Minutes 18   Total Session Time (sum of timed and untimed services) 30

## 2023-12-29 NOTE — PLAN OF CARE
Problem: Adult Inpatient Plan of Care  Goal: Optimal Comfort and Wellbeing  Outcome: Progressing  Intervention: Monitor Pain and Promote Comfort  Recent Flowsheet Documentation  Taken 12/28/2023 0943 by Shahzad Alvarez RN  Pain Management Interventions: medication (see MAR)     Problem: Dysrhythmia  Goal: Normalized Cardiac Rhythm  Outcome: Progressing     ICD/Pacemaker placed today. Pt gone most of afternoon/evening for procedure. Prior to procedure shift was uneventful. Sinus rhythm with occasional PVCs on tele. Vitally stable.

## 2023-12-29 NOTE — PROGRESS NOTES
Care Management Discharge Note    Discharge Date: 12/29/2023       Discharge Disposition: Home    Discharge Services: None    Discharge DME: None    Discharge Transportation:  Family/friends    Private pay costs discussed: Not applicable    Does the patient's insurance plan have a 3 day qualifying hospital stay waiver?  No    PAS Confirmation Code:  NA  Patient/family educated on Medicare website which has current facility and service quality ratings: no    Education Provided on the Discharge Plan: Yes per team  Persons Notified of Discharge Plans: Patient per team  Patient/Family in Agreement with the Plan: yes    Handoff Referral Completed: Yes    Additional Information:  Patient discharge home. No CM discharge planning needs. Family will transport.    Shaniqua Freedman RN

## 2023-12-29 NOTE — DISCHARGE SUMMARY
[facility]  Discharge Summary - Medicine & Pediatrics       Date of Admission:  12/23/2023  Date of Discharge:  12/29/2023  Discharging Provider: Vitaly Loyola MD  Discharge Service: Hospitalist Service    Discharge Diagnoses   Paroxysmal atrial tachycardia s/p CRT-D placement 12/28  Polymorphic ventricular tachycardia s/p resuscitation 12/25  Dilated cardiomyopathy w/ severely depressed LV systolic function 2/2 myocarditis  LBBB, newly diagnosed  Subclinical hypothyroidism    Clinically Significant Risk Factors          Follow-ups Needed After Discharge   Follow-up Appointments     Follow-up and recommended labs and tests       Follow up with primary care provider, Physician No Ref-Primary, within 7   days to evaluate medication change and for hospital follow- up.  Recheck   TSH in 6-8 weeks.    Follow up with cardiology as scheduled on 1/5/2024.            Unresulted Labs Ordered in the Past 30 Days of this Admission       No orders found from 11/23/2023 to 12/24/2023.          Discharge Disposition   Discharged to home  Condition at discharge: Stable    Hospital Course   Sherry King is a 63 year old female admitted on 12/23/2023. She does not have significant past medical history and was admitted for new paroxysmal symptomatic tachycardia.      Paroxysmal atrial tachycardia s/p CRT-D placement 12/28  Polymorphic ventricular tachycardia s/p resuscitation 12/25  Symptom onset two days PTA. Initially NSR on tele strips here, but then continued episodes of symptomatic tachycardia with associated palpitations, dyspnea and dizziness. Cards initially planned for ablation but patient found unresponsive early AM 12/25 with sustained torsades requiring resuscitation. Achieved ROSC after about 15 min of chest compressions and one 200J asynchronized shock. Transferred to ICU for closer monitoring, but quickly stabilized and transferred back to floor same day. Episode frequency and severity  improved with initiation of medications, no antiarrhythmics were given in setting of prolonged QT. Underwent CRT-D placement on 12/28 without complication. Episodes resolved by day of discharge.   - Follow up in device clinic as scheduled 1/5/2024  - Discharge post-op instructions provided by EP        Dilated cardiomyopathy w/ severely depressed LV systolic function, 2/2 myocarditis   LBBB, newly diagnosed  No prior cardiac history. Endorsed new PETERS, denied PND or orthopnea, no JVD or peripheral edema. D-dimer 2.76, CTPE negative for PE but demonstrated mild cardiomegaly with mild interstitial edema. BNP 2700, trop 29 with stable repeat. A1c 5.1, lipids unremarkable. EKG findings initially NSR with new LBBB. Echo came back with severely reduced systolic function with global hypokinesis and intraventricular dyssynchrony d/t LBBB, EF 20-25%. Normal CRP. CTA coronary angiogram negative, cardiac MR consistent with myocarditis, likely viral. Small pericardial effusion also seen.   - continue metoprolol XL 50 BID and losartan 25 mg daily  - follow up in HF clinic in 2 weeks, Dr. Harrison in 3 months        Subclinical hypothyroidism  TSH 7.94 but T4 wnl on admission.   - recheck in outpatient     Consultations This Hospital Stay   CARE MANAGEMENT / SOCIAL WORK IP CONSULT  CARDIOLOGY IP CONSULT  VASCULAR ACCESS ADULT IP CONSULT  HOSPITALIST IP CONSULT  ELECTROPHYSIOLOGY IP CONSULT  PHARMACY IP CONSULT  PHARMACY IP CONSULT  CARDIAC REHAB IP CONSULT    Code Status   Full Code       The patient was discussed with the Attending Physician, Dr. Rosenstein.    Vitaly Loyola MD  [facility]  [address] [place] [id number]  Phone: [phone]  Fax: [phone]  ______________________________________________________________________    Physical Exam   Vital Signs: Temp: 97.8  F (36.6  C) Temp src: Oral BP: 118/78 Pulse: 91   Resp: 18 SpO2: 100 % O2 Device: None (Room  air) Oxygen Delivery: 2 LPM  Weight: 136 lbs 0 oz    General Appearance:  Sitting up in bed, pleasant, appears comfortable, alert, NAD.  Respiratory: No increased work of breathing. CTAB- no wheezing, crackles, rhonchi.   Cardiovascular: RRR, no murmurs. No peripheral edema. Device site dressings C/D/I. No surrounding erythema or swelling.   GI: Soft, nondistended, nontender.   Skin: Dry, warm.   Other:  Calm, A&Ox3.       Primary Care Physician   Physician No Ref-Primary    Discharge Orders      Reason for your hospital stay    Paroxysmal atrial tachycardia, myocarditis     Activity    Your activity upon discharge: activity as tolerated     Follow-up and recommended labs and tests     Follow up with primary care provider, Physician No Ref-Primary, within 7 days to evaluate medication change and for hospital follow- up.  Recheck TSH in 6-8 weeks.    Follow up with cardiology as scheduled on 1/5/2024.     Diet    Follow this diet upon discharge: low sodium diet       Significant Results and Procedures   Results for orders placed or performed during the hospital encounter of 12/23/23   CT Chest Pulmonary Embolism w Contrast    Narrative    EXAM: CT CHEST PULMONARY EMBOLISM W CONTRAST  LOCATION: [facility]  DATE: 12/23/2023    INDICATION: chest pain,+ddimer  COMPARISON: None.  TECHNIQUE: CT chest pulmonary angiogram during arterial phase injection of IV contrast. Multiplanar reformats and MIP reconstructions were performed. Dose reduction techniques were used.   CONTRAST: isovue 370 90ml    FINDINGS:  ANGIOGRAM CHEST: Pulmonary arteries are normal caliber and negative for pulmonary emboli. Thoracic aorta is negative for dissection. No CT evidence of right heart strain.    LUNGS AND PLEURA: Mild atelectasis involving inferior lingula. Very mild smooth interseptal prominence at lung bases raises question of minimal interstitial edema. Mild pleural thickening at both  apices.    MEDIASTINUM/AXILLAE: Heart size upper limits of normal. Mild dilatation of left ventricle.    CORONARY ARTERY CALCIFICATION: None.    UPPER ABDOMEN: Normal.    MUSCULOSKELETAL: Normal.      Impression    IMPRESSION:  1.  No pulmonary emboli.  2.  Mild cardiomegaly with prominence of left ventricle and suggestion of mild interstitial edema.   MR Myocardium  Overread    Narrative    OVERREAD: DETAILED [facility] EXTRACARDIAC OVERREAD OF CARDIAC MRI   LOCATION: [facility]  DATE: 2023    INDICATION: Dilated cardiomyopathy.  COMPARISON: CT pulmonary angiogram 2023    FINDINGS:    CHEST: Negative.  MEDIASTINUM: Negative.  UPPER ABDOMEN: Negative.      Impression    IMPRESSION:  1. No actionable incidental extracardiac findings.  2. Please refer to cardiologist's dictation for the cardiac MRI report.   XR Chest 2 Views    Narrative    EXAM: XR CHEST 2 VIEWS  LOCATION: [facility]  DATE: 2023    INDICATION: Continue CRT ICD (pacing lead at the His Purkinje system site)  COMPARISON: Chest CT 2023      Impression    IMPRESSION: Left subclavian approach three lead pacer/ICD, with lead tips over the expected regions of the right atrium and right ventricle (one lead is presumed at the His site, as listed in the indication). No pneumothorax. Small pleural effusions.   Mild basilar atelectasis. Normal heart size.      EP Device    Addendum: 2023      [facility]  Cardiac Electrophysiology     PROCEDURE NOTE: Insertion of New CRT (Biventricular) ICD System with Resynchronization Lead Placed at the His-Purkinje Site.     PROCEDURALIST: MD Sherry Beatty    : 1960    Procedure date: 2023    PCP: No Ref-Primary, Physician    Impression:  Aborted cardiac arrest, chronic idiopathic (myocarditis) cardiomyopathy, and left bundle branch block.  Successful peripheral venogram (left subclavian) which  demonstrated a slightly more proximal course of the vein over the first rib with free flow of contrast to the innominate and SVC systems  Successful implant of a CRT ICD system.  Note the cardiac resynchronization pacing lead was placed at the His-Purkinje pacing site.  Normal acute lead function   Normal acute lead/generator performance   No evidence of acute complication     Recommendations:  The patient will be returned to the  cardiac telemetry unit for postprocedural care.    Medication changes: Continue current inpatient medications, and patient's beta-blocker therapy can be uptitrated as tolerated by her blood pressure.  Chest x-ray tomorrow morning  Device interrogation tomorrow morning  Follow-up in the device clinic in 7-10 days.  Discharge planning per the hospitalist and rounding cardiology service    Indication: Cardiac arrest (aborted) with polymorphic VT, underlying nonischemic cardiomyopathy (myocarditis-chronic) and left bundle branch block.      History: Sherry King is a 63 year old female recently admitted with aborted cardiac arrest secondary to polymorphic ventricular tachycardia.  Workup demonstrates a chronic myocarditis with nonischemic cardiomyopathy (LVEF 21%).  Newly diagnosed left bundle branch block.  The patient has NYHA class 2 heart failure. The patient has been referred for insertion of a CRT-D defibrillator system to provide secondary prevention protection against recurrent malignant ventricular arrhythmia and treat her underlying cardiomyopathy.  The risks benefits and expected outcomes have been explained in detail, and the patient agrees to proceed.    Procedure initiation:  The patient was taken to the cardiac electrophysiology laboratory in the fasting nonsedated state.  Timeout was called and appropriateness of procedure was documented.  Under my direct supervision the patient received intravenous Precedex and fentanyl for conscious sedation throughout the course of  "their procedure.  Please refer to the separate procedural log for documentation of dosing and vital signs. Local infiltration of the operative site with lidocaine.  The patient then had the left shoulder prepped and draped in the usual sterile fashion.      Instrumentation:  A 6 cm skin incision was made 3 cm below the left clavicle.  The patient underwent subclavian venography which demonstrated the course of the subclavian vein to be slightly more inferior along the portion traversing the first rib with free flow of contrast to the innominate and SVC system.  This subclavian vein was punctured on 3 separate occasions without incident.  Two 9 Fr and a single 8 Fr peel-away sheaths were inserted into the central venous system without difficulty.  The right ventricular high-voltage lead was advanced across the tricuspid valve and into the apical mid septal portion of the right ventricle, actively fixated and then secured with two  2-0 Tevdek tiedown sutures.  The CRT pacing lead was advanced.  The Purple Harry 3D deflectable sheath to the tricuspid annulus previously defined by placement of a quadripolar catheter to demonstrate anatomic location of the His-Purkinje system.  The lead was carefully actively fixated and then rotated deep within the septum monitoring QRS morphology, QRS duration, pacing lead impedance.  Once the final position was achieved the inner and outer sheaths were removed and the lead was secured with two 2-0 Tevdek tiedown sutures at the pectoralis muscle.  The right atrial lead was introduced and the chamber was found to be small with some difficulty keeping the \"J\" shaped lead within the atrial body.  Ultimately this was successfully achieved and the lead was actively fixated, sheath removed, and the lead was secured with two 2-0 tiedown Tevdek sutures at the pectoralis muscle.   The pocket was flushed with copious amounts of antibiotic irrigation.  The leads and generator were placed within the " pocket.  The skin was closed in three layers, with the deep layers being running 2-0 Vicryl suture and the skin approximation with a running 4-0 V-Loc suture.  The skin was cleaned and sterile Steri-Strips placed along with a sterile dressing.  The patient was transported to the telemetry unit in stable condition.    CRT Pacing Lead:  Stim > R V5 =75 ms  Stim > QRS onset = 26 ms  QRS onset > R V5 = 48 ms  QRS Duration = 101 ms    Device Implanted:   Please see attached implant sheet for device Model/Serial numbers and implant results.      Anatomic and technical issues:  No anatomic variants were observed during the patient's procedure.  No technical issues were encountered.                    Narrative    Images from the original result were not included.    [facility]  Cardiac Electrophysiology     PROCEDURE NOTE: Insertion of New CRT (Biventricular) ICD System with   Resynchronization Lead Placed at the His-Purkinje Site.     PROCEDURALIST: MD Sherry Beatty    : 1960    Procedure date: 2023    PCP: No Ref-Primary, Physician    Impression:  Aborted cardiac arrest, chronic idiopathic (myocarditis) cardiomyopathy,   and left bundle branch block.  Successful peripheral venogram (left subclavian) which demonstrated a   slightly more proximal course of the vein over the first rib with free   flow of contrast to the innominate and SVC systems  Successful implant of a CRT ICD system.  Note the cardiac   resynchronization pacing lead was placed at the His-Purkinje pacing site.  Normal acute lead function   Normal acute lead/generator performance   No evidence of acute complication     Recommendations:  The patient will be returned to the  cardiac telemetry unit for   postprocedural care.    Medication changes: Continue current inpatient medications, and patient's   beta-blocker therapy can be uptitrated as tolerated by her blood pressure.  Chest x-ray tomorrow  morning  Device interrogation tomorrow morning  Follow-up in the device clinic in 7-10 days.  Discharge planning per the hospitalist and rounding cardiology service    Indication: Cardiac arrest (aborted) with polymorphic VT, underlying   nonischemic cardiomyopathy (myocarditis-chronic) and left bundle branch   block.      History: Sherry King is a 63 year old female recently admitted with   aborted cardiac arrest secondary to polymorphic ventricular tachycardia.    Workup demonstrates a chronic myocarditis with nonischemic cardiomyopathy   (LVEF 21%).  Newly diagnosed left bundle branch block.  The patient has   NYHA class 2 heart failure. The patient has been referred for insertion of   a CRT-D defibrillator system to provide secondary prevention protection   against recurrent malignant ventricular arrhythmia and treat her   underlying cardiomyopathy.  The risks benefits and expected outcomes have   been explained in detail, and the patient agrees to proceed.    Procedure initiation:  The patient was taken to the cardiac electrophysiology laboratory in the   fasting nonsedated state.  Timeout was called and appropriateness of   procedure was documented.  Under my direct supervision the patient   received intravenous Precedex and fentanyl for conscious sedation   throughout the course of their procedure.  Please refer to the separate   procedural log for documentation of dosing and vital signs. Local   infiltration of the operative site with lidocaine.  The patient then had   the left shoulder prepped and draped in the usual sterile fashion.      Instrumentation:  A 6 cm skin incision was made 3 cm below the left clavicle.  The patient   underwent subclavian venography which demonstrated the course of the   subclavian vein to be slightly more inferior along the portion traversing   the first rib with free flow of contrast to the innominate and SVC system.    This subclavian vein was punctured on 3 separate  "occasions without   incident.  Two 9 Fr and a single 8 Fr peel-away sheaths were inserted into   the central venous system without difficulty.  The right ventricular   high-voltage lead was advanced across the tricuspid valve and into the   apical mid septal portion of the right ventricle, actively fixated and   then secured with two  2-0 Tevdek tiedown sutures.  The CRT pacing lead   was advanced.  The Medtronic 3D deflectable sheath to the tricuspid   annulus previously defined by placement of a quadripolar catheter to   demonstrate anatomic location of the His-Purkinje system.  The lead was   carefully actively fixated and then rotated deep within the septum   monitoring QRS morphology, QRS duration, pacing lead impedance.  Once the   final position was achieved the inner and outer sheaths were removed and   the lead was secured with two 2-0 Tevdek tiedown sutures at the pectoralis   muscle.  The right atrial lead was introduced and the chamber was found to   be small with some difficulty keeping the \"J\" shaped lead within the   atrial body.  Ultimately this was successfully achieved and the lead was   actively fixated, sheath removed, and the lead was secured with two 2-0   tiedown Tevdek sutures at the pectoralis muscle.   The pocket was flushed with copious amounts of antibiotic irrigation.  The   leads and generator were placed within the pocket.  The skin was closed in   three layers, with the deep layers being running 2-0 Vicryl suture and the   skin approximation with a running 4-0 V-Loc suture.  The skin was cleaned   and sterile Steri-Strips placed along with a sterile dressing.  The   patient was transported to the telemetry unit in stable condition.    Device Implanted:   Please see attached implant sheet for device Model/Serial numbers and   implant results.      Anatomic and technical issues:  No anatomic variants were observed during the patient's procedure.  No technical issues were " encountered.               Echocardiogram Complete     Value    LVEF  20-25%    Narrative    [id number]  [id number]  [id number]  [id number]^ADRIANNE^KAT^[place] [address]     Name: ROBERTA MORTENSEN  MRN: [id number]  : 1960  Study Date: 2023 08:51 AM  Age: 63 yrs  Gender: Female  Patient Location: [facility]  Reason For Study: Heart Failure, Unspecified  Ordering Physician: KAT SILVER  Performed By: HUA     BSA: 1.8 m2  Height: 70 in  Weight: 135 lb  HR: 89  BP: 109/67 mmHg  ______________________________________________________________________________  Procedure  Complete Echo Adult. Definity (NDC #02940-189) given intravenously. Good  quality two-dimensional was performed and interpreted. Good quality color and  spectral Doppler were performed and interpreted. There is no comparison study  available.  ______________________________________________________________________________  Interpretation Summary     1. Left ventricular chamber size is mildly enlarged. Wall thickness is normal.  Systolic function is severely reduced with global hypokinesis and  intraventricular dyssynchrony due to left bundle branch block. The visually  estimated left ventricular ejection fraction is 20-25%.  2. Right ventricular chamber size and systolic function are normal.  3. No hemodynamically significant valvular abnormalities.  4. No prior study available for comparison.  ______________________________________________________________________________  I      WMSI = 2.00     % Normal = 0     X - Cannot   0 -                      (2) - Mildly 2 -          Segments  Size  Interpret    Hyperkinetic 1 - Normal  Hypokinetic  Hypokinetic  1-2     small                                                     7 -          3-5      moderate  3 - Akinetic 4 -          5 -         6 - Akinetic Dyskinetic   6-14    large               Dyskinetic   Aneurysmal  w/scar       w/scar       15-16   diffuse      Left Ventricle  The left ventricle is mildly dilated. There is normal left ventricular wall  thickness. The visual ejection fraction is 20-25%. Left ventricular diastolic  function is not assessable. There is severe global hypokinesia of the left  ventricle. Septal motion is consistent with conduction abnormality. There is  no thrombus seen in the left ventricle.     Right Ventricle  Normal right ventricle size and systolic function.     Atria  Normal left atrial size. Right atrial size is normal.     Mitral Valve  Mitral valve leaflets appear normal. There is trace mitral regurgitation.  There is no mitral valve stenosis.     Tricuspid Valve  Tricuspid valve leaflets appear normal. There is trace tricuspid  regurgitation. Right ventricular systolic pressure could not be approximated  due to inadequate tricuspid regurgitation. There is no tricuspid stenosis.     Aortic Valve  Aortic valve leaflets appear normal. The aortic valve is trileaflet. No aortic  regurgitation is present. No aortic stenosis is present.     Pulmonic Valve  The pulmonic valve is not well visualized. There is no pulmonic valvular  regurgitation. There is no pulmonic valvular stenosis.     Vessels  The aorta root is normal. The thoracic aorta is normal. IVC diameter <2.1 cm  collapsing >50% with sniff suggests a normal RA pressure of 3 mmHg.     Pericardium  There is no pericardial effusion.     Rhythm  Sinus rhythm was noted.     ______________________________________________________________________________  MMode/2D Measurements & Calculations  IVSd: 0.79 cm  LVIDd: 5.3 cm  LVIDs: 4.5 cm  LVPWd: 0.88 cm  FS: 15.6 %     LV mass(C)d: 158.8 grams  LV mass(C)dI: 89.9 grams/m2  Ao root diam: 2.9 cm  asc Aorta Diam: 2.9 cm  LVOT diam: 1.9 cm  LVOT area: 2.8 cm2  Ao root diam index Ht(cm/m): 1.6  Ao root diam index BSA (cm/m2): 1.6  Asc Ao diam index BSA (cm/m2): 1.6  Asc Ao diam index Ht(cm/m): 1.6  LA Volume Indexed (AL/bp): 27.7 ml/m2  RV Base:  2.8 cm     RWT: 0.33  TAPSE: 1.9 cm     Time Measurements  MM HR: 98.0 BPM     Doppler Measurements & Calculations  MV E max ravi: 66.0 cm/sec  MV max P.0 mmHg  MV mean P.0 mmHg  MV V2 VTI: 23.5 cm  MVA(VTI): 1.8 cm2  Ao V2 max: 149.0 cm/sec  Ao max P.0 mmHg  Ao V2 mean: 105.0 cm/sec  Ao mean P.0 mmHg  Ao V2 VTI: 27.6 cm  KOLE(I,D): 1.6 cm2  KOLE(V,D): 1.7 cm2  LV V1 max PG: 3.1 mmHg  LV V1 max: 88.2 cm/sec  LV V1 VTI: 15.3 cm  SV(LVOT): 43.4 ml  SI(LVOT): 24.6 ml/m2  PA V2 max: 73.0 cm/sec  PA max P.1 mmHg  PA acc time: 0.10 sec  AV Ravi Ratio (DI): 0.59  KOLE Index (cm2/m2): 0.89  E/E': 8.7     E/E' av.8  Lateral E/e': 8.9  Medial E/e': 8.7  Peak E' Ravi: 7.6 cm/sec  RV S Ravi: 10.0 cm/sec     ______________________________________________________________________________  Report approved by: Hubert Romo 2023 11:55 AM         Cardiac Catheterization    Narrative    1.  Large-caliber normal-appearing epicardial coronary arteries in a right   dominant system.     MR Cardiac WO & W Contrast    Narrative                                                     [facility]                                                     CMR Report      MRN:               [id number]                                  Name:        ROBERTA MORTENSEN                                  :              1960-Dec-15                                  Scan Date:        2023-Dec-26                                  Electronically signed by Vitaly Resendez 2023-Dec-26 15:03:39    VITALS   ==========================================================================================================    HEIGHT: 70.0 in    (177.8 cm)  WEIGHT: 134.4 lbs    (61.0 kgs)  BSA: 1.76 m^2    SUMMARY   ==========================================================================================================    1.  Left ventricle is moderately enlarged with severe reduction in global systolic function. There is  severe desynchrony related to  conduction abnormality (LBBB).  Normal wall thickness .  The quantified left  ventricular ejection fraction is 21%.  There is areas of patchy epicardial and mid myocardial  enhancement/fibrosis on LGE imaging most consistent with myocarditis.  Pre-contrast T1 is abnormal and  elevated at 1160 ms.  ECV: 33.6% (elevated, abnormal).   2.  Mild enlargement of right ventricle with severe reduction in systolic function.  RVEF:22%  3.  No significant valvular abnormalities.  4.  Normal right and left atrial size.   5.  There is a small pericardial effusion.     ==========================================================================================================  REPORT FINDINGS:    LEFT VENTRICLE: Left ventricle is moderately enlarged with severe reduction in global systolic function. There is severe  desynchrony related to conduction abnormality (LBBB).  Normal wall thickness . The quantified left  ventricular ejection fraction is 21%.  No myocardial scar is identified.      RIGHT VENTRICLE: Mild enlargement of right ventricle with severe reduction in systolic function.  RVEF:22%    LEFT ATRIUM: LA cavity size is normal.    RIGHT ATRIUM: RA cavity size is normal.    PERICARDIUM: There is a small pericardial effusion.    PLEURAL EFFUSION: There is no pleural effusion.    AORTIC VALVE: The aortic valve annulus is normal in size. Aortic valve is trileaflet. There is no aortic regurgitation.  There is no aortic stenosis.     MITRAL VALVE: The mitral valve annulus is normal in size. Mitral valve leaflets are normal. There is no mitral  regurgitation. There is no mitral stenosis.     TRICUSPID VALVE: The tricuspid valve annulus is normal in size. Tricuspid valve leaflets are normal. There is no tricuspid  regurgitation. There is no tricuspid stenosis.     PULMONIC VALVE: The pulmonic valve annulus is normal in size. Pulmonic valve leaflets are normal. There is no pulmonic  regurgitation. There is no pulmonic stenosis.      AORTIC ROOT: The aortic root is normal.      CORE EXAM   ==========================================================================================================    MEASUREMENTS   ----------------------------------------------------------------------------------------      VOLUMETRIC ANALYSIS       ----------------------------------------------  .---------------------------------------------------------.                    LV     Reference  RV    Reference   +------+-----------+-------+-----------+------+-----------+   EDV   ml         197.2   ()  69.0   ()          ml/m^2     111.9   (53-87)   39.2   (49-86)     ESV   ml         155.0   (20-57)   53.8   (11-63)           ml/m^2      87.9   (13-31)   30.5   (8-34)      CO    L/min       4.01             1.44                     L/min/m^2   2.28             0.82               MASS  g          147.5   ()                           g/m^2       83.7   (48-78)                      SV    ml          42.2   ()  15.2   ()          ml/m^2      24.0   (36-60)    8.6   (34-58)     EF    %           21.4   (60-78)   22.0   (57-81)    '------+-----------+-------+-----------+------+-----------'            CARDIAC OUTPUT HR:  95 BPM      EXTRACELLULAR VOLUME MEASUREMENT       ----------------------------------------------          PRE-CONTRAST T1 MYOCARDIUM:  1160 msec          PRE-CONTRAST T1 LV CAVITY:  1718 msec          POST-CONTRAST T1 MYOCARDIUM:  421 msec          POST-CONTRAST T1 LV CAVITY:  300 msec          HEMATOCRIT:  39 %          HEMATOCRIT DATE:  2023-Dec-26          ECV:  33.6 %        SCAN INFO   ==========================================================================================================    GENERAL   ----------------------------------------------------------------------------------------      SCANNER        ----------------------------------------------          :  SIEMENS          MODEL:  Aera        CONTRAST AGENT       ----------------------------------------------          TYPE:  Gadavist          GD CONCENTRATION:  0.5 M          VOLUME ADMINISTERED:  12 ml          DOSAGE:  0.10 mmol/kg        SETUP       ----------------------------------------------          SCAN TYPE:  Clinical          PATIENT TYPE:  Inpatient          REASON(S) FOR SCAN:  Ventricular: non-EP, nonischemic CM etiology           ATTENDING PHYSICIAN:  RAY MONTILLA   ==========================================================================================================    CPT Codes  ICD10 Codes      Report generated by [employer], a product of [employer]       Discharge Medications   Current Discharge Medication List        START taking these medications    Details   losartan (COZAAR) 25 MG tablet Take 1 tablet (25 mg) by mouth daily  Qty: 30 tablet, Refills: 0    Associated Diagnoses: LVEF <40%      metoprolol succinate ER (TOPROL XL) 50 MG 24 hr tablet Take 1 tablet (50 mg) by mouth 2 times daily  Qty: 60 tablet, Refills: 0    Associated Diagnoses: LVEF <40%           CONTINUE these medications which have NOT CHANGED    Details   BIOTIN PO Take 1 tablet by mouth daily as needed      Cholecalciferol (VITAMIN D-3 PO) Take 1 tablet by mouth daily as needed      DENTA 5000 PLUS 1.1 % CREA daily      MAGNESIUM GLYCINATE PO Take 1 tablet by mouth daily as needed      MILK THISTLE PO Take 1 tablet by mouth daily as needed      Turmeric (QC TUMERIC COMPLEX PO) Take 1 tablet by mouth daily as needed           Allergies   Allergies   Allergen Reactions    Lanolin Rash

## 2023-12-29 NOTE — PROGRESS NOTES
[facility]   [address]  [place] [address]   [email or web address]   OFFICE: [phone]     CARDIOLOGY FOLLOW-UP NOTE      Impression and Plan     Impression:   Polymorphic ventricular tachycardia - secondary to R on T phenomenon on 12/24 with aborted cardiac arrest and successful resuscitation  LBBB - newly diagnosed, with severe LV dyssynchrony. S/p placement of CRT-D system with resynchronization lead placed at the His-purkinje site.  Dilated cardiomyopathy with severely depressed LV systolic function - cardiac MRI consistent with myocarditis. Not in decompensated heart failure.  Paroxysmal atrial tachycardia    Plan:  Planning on ICD implantation today.   Continue metoprolol succinate and losartan. Unable to add spironolactone due to low blood pressure  Okay for discharge from cardiac standpoint.  Follow up in heart failure clinic in 2 weeks, Dr. Harrison in 3 months.    Primary Cardiologist: Dr. Ezra Harrison    Subjective     Feeling well. No palpitations or chest pain. Has some mild discomfort in left upper arm.     Cardiac Diagnostics   Telemetry (personally reviewed): paced rhythm    ECG after CRT-D implant - atrial sensed ventricular paced rhythm with QRS duration of 118 ms.    Cardiac MRI 12/26  1.  Left ventricle is moderately enlarged with severe reduction in global systolic  function. There is severe desynchrony related to conduction abnormality (LBBB).  Normal wall thickness .  The quantified left ventricular ejection fraction is 21%.  There is areas of patchy epicardial and mid myocardial enhancement/fibrosis on LGE imaging most consistent with myocarditis. Pre-contrast T1 is abnormal and elevated at 1160 ms.  ECV: 33.6% (elevated, abnormal).   2.  Mild enlargement of right ventricle with severe reduction in systolic function.  RVEF:22%  3.  No significant valvular abnormalities.  4.  Normal right and left atrial size.   5.  There is a small  "pericardial effusion.    Echocardiogram (results reviewed):   TTE 12/24/23  1. Left ventricular chamber size is mildly enlarged. Wall thickness is normal.  Systolic function is severely reduced with global hypokinesis and intraventricular dyssynchrony due to left bundle branch block. The visually estimated left ventricular ejection fraction is 20-25%.  2. Right ventricular chamber size and systolic function are normal.  3. No hemodynamically significant valvular abnormalities.  4. No prior study available for comparison.    Cardiac Cath (results reviewed): 12/26/23  1.  Large-caliber normal-appearing epicardial coronary arteries in a right dominant system.       Physical Examination       /78 (BP Location: Left arm)   Pulse 91   Temp 97.8  F (36.6  C) (Oral)   Resp 18   Ht 1.778 m (5' 10\")   Wt 61.7 kg (136 lb)   SpO2 100%   BMI 19.51 kg/m           Intake/Output Summary (Last 24 hours) at 12/27/2023 0837  Last data filed at 12/27/2023 0741  Gross per 24 hour   Intake 480 ml   Output 2200 ml   Net -1720 ml       General: pleasant female. No acute distress.   HENT: external ears normal. Nares patent. Mucous membranes moist.  Eyes: perrla, extraocular muscles intact. No scleral icterus.   Neck: No JVD  Lungs: clear to auscultation. Stable ICD pocket.   COR: regular rate and rhythm, No murmurs, rubs, or gallops  Abd: nondistended, BS present  Extrem: No edema         Imaging      Chest x-ray  IMPRESSION: Left subclavian approach three lead pacer/ICD, with lead tips over the expected regions of the right atrium and right ventricle (one lead is presumed at the His site, as listed in the indication). No pneumothorax. Small pleural effusions.   Mild basilar atelectasis. Normal heart size.     Lab Results   Lab Results   Component Value Date    CHOL 216 (H) 12/24/2023     12/24/2023    TRIG 76 12/24/2023    BUN 10.9 12/29/2023     12/29/2023    CO2 27 12/29/2023       Lab Results   Component Value " Date    WBC 5.4 12/29/2023    HGB 12.0 12/29/2023    HCT 36.3 12/29/2023    MCV 99 12/29/2023     (L) 12/29/2023       Lab Results   Component Value Date    TSH 7.94 (H) 12/23/2023               Current Inpatient Scheduled Medications   Scheduled Meds:   [Held by provider] enoxaparin ANTICOAGULANT  40 mg Subcutaneous Q24H    lidocaine  2 patch Transdermal Q24H    losartan  25 mg Oral Daily    magnesium oxide  200 mg Oral Daily    magnesium oxide  400 mg Oral Q4H    metoprolol succinate ER  50 mg Oral BID    sodium chloride (PF)  3 mL Intracatheter Q8H    Vitamin D3  25 mcg Oral Daily     Continuous Infusions:   - MEDICATION INSTRUCTIONS -              Medications Prior to Admission   Prior to Admission medications    Medication Sig Start Date End Date Taking? Authorizing Provider   BIOTIN PO Take 1 tablet by mouth daily as needed   Yes Reported, Patient   Cholecalciferol (VITAMIN D-3 PO) Take 1 tablet by mouth daily as needed   Yes Reported, Patient   DENTA 5000 PLUS 1.1 % CREA daily 12/22/23  Yes Reported, Patient   MAGNESIUM GLYCINATE PO Take 1 tablet by mouth daily as needed   Yes Reported, Patient   MILK THISTLE PO Take 1 tablet by mouth daily as needed   Yes Reported, Patient   Turmeric (QC TUMERIC COMPLEX PO) Take 1 tablet by mouth daily as needed   Yes Reported, Patient

## 2023-12-30 LAB
ATRIAL RATE - MUSE: 73 BPM
DIASTOLIC BLOOD PRESSURE - MUSE: NORMAL MMHG
INTERPRETATION ECG - MUSE: NORMAL
P AXIS - MUSE: 69 DEGREES
PR INTERVAL - MUSE: 124 MS
QRS DURATION - MUSE: 118 MS
QT - MUSE: 516 MS
QTC - MUSE: 568 MS
R AXIS - MUSE: 52 DEGREES
SYSTOLIC BLOOD PRESSURE - MUSE: NORMAL MMHG
T AXIS - MUSE: 266 DEGREES
VENTRICULAR RATE- MUSE: 73 BPM

## 2024-01-02 DIAGNOSIS — I50.22 CHRONIC SYSTOLIC HEART FAILURE (H): Primary | ICD-10-CM

## 2024-01-04 NOTE — DISCHARGE INSTRUCTIONS
Implantable Cardiac Defibrillator (ICD) Post-operative Checklist    The Device Registered Nurse (RN) reviewed the ICD function.    The Device RN did a wound assessment and wound care teaching.    Please call the Device Nurses with any signs of infection or questions regarding wound healing. Device Nurse Line: [phone], Option#3.    The Device RN demonstrated and displayed the specific remote monitoring system for your ICD.    The Device RN reviewed the Partnership Agreement Form.    Patient Instructions  Do not lift your left arm above the shoulder height, perform any vigorous arm movements such as swimming, golfing, washing windows, shoveling show, vacuuming or lifting greater than 10-15lbs with the affected arm for 4 weeks form the date of implant (until 1/25/24).    To reduce the risk of infection, try to avoid any dental procedures for the first 6 weeks after your ICD implant.    You will receive a device identification (ID) card in the mail from the device  within 6 weeks to replace the temporary ID card you were given in the hospital.    You may travel by any mode of transportation; just show your ICD ID card. You may be subject to a hand search or use of a handheld wand, but official should not keep the wand over the implant site for greater than 5-10 seconds.     For any surgery, let your doctor know you have an ICD.    Your ICD system is MRI safe after 2/8/24    Most household appliances, including a microwave, will not interfere with your ICD function. If you suspect interference, simply move away from the source. Cell phones do not cause a problem. Please refer to the device booklet from the  or their website under the section on electromagnetic interference (SIRIA) for further guidelines on things that may interfere with your ICD.     If you receive a shock from your device:  Keep a diary of your symptoms and activities at the time of the shock.  If you receive 1 shock, your  symptoms subside and you feel well, call the Device Clinic. If this occurs after hours call the next morning.   If you receive 1 shock and your symptoms do not subside, or you receive multiple shocks: Call 911.      Device Clinic Contact Information  Device Nurses: [phone], Option #3.  Device Remote Specialists: [phone], Option #2. For questions about your Remote Transmission or Transmission Schedule   Device Schedulers: [phone], Option #1

## 2024-01-05 ENCOUNTER — DOCUMENTATION ONLY (OUTPATIENT)
Dept: CARDIOLOGY | Facility: CLINIC | Age: 64
End: 2024-01-05

## 2024-01-05 ENCOUNTER — ANCILLARY PROCEDURE (OUTPATIENT)
Dept: CARDIOLOGY | Facility: CLINIC | Age: 64
End: 2024-01-05
Attending: INTERNAL MEDICINE
Payer: COMMERCIAL

## 2024-01-05 ENCOUNTER — TRANSFERRED RECORDS (OUTPATIENT)
Dept: CARDIOLOGY | Facility: CLINIC | Age: 64
End: 2024-01-05

## 2024-01-05 DIAGNOSIS — I25.5 ISCHEMIC CARDIOMYOPATHY: Primary | ICD-10-CM

## 2024-01-05 DIAGNOSIS — Z95.810 ICD (IMPLANTABLE CARDIOVERTER-DEFIBRILLATOR) IN PLACE: ICD-10-CM

## 2024-01-05 DIAGNOSIS — I47.29 PAROXYSMAL VENTRICULAR TACHYCARDIA (H): ICD-10-CM

## 2024-01-05 NOTE — PROGRESS NOTES
Completed [place] paperwork for patient while in clinic for her device check s/p CRT-D implant on 12-28-23.  Faxed to her employer, scanned into patients chart and original copy given to patient.

## 2024-01-08 ENCOUNTER — HOSPITAL ENCOUNTER (OUTPATIENT)
Dept: CARDIAC REHAB | Facility: HOSPITAL | Age: 64
Discharge: HOME | End: 2024-01-08
Attending: INTERNAL MEDICINE
Payer: COMMERCIAL

## 2024-01-08 DIAGNOSIS — I50.22 CHRONIC SYSTOLIC HEART FAILURE (H): ICD-10-CM

## 2024-01-08 PROCEDURE — 93797 PHYS/QHP OP CAR RHAB WO ECG: CPT

## 2024-01-08 PROCEDURE — 93798 PHYS/QHP OP CAR RHAB W/ECG: CPT

## 2024-01-10 ENCOUNTER — TRANSFERRED RECORDS (OUTPATIENT)
Dept: HEALTH INFORMATION MANAGEMENT | Facility: CLINIC | Age: 64
End: 2024-01-10

## 2024-01-12 ENCOUNTER — HOSPITAL ENCOUNTER (OUTPATIENT)
Dept: CARDIAC REHAB | Facility: HOSPITAL | Age: 64
Discharge: HOME | End: 2024-01-12
Attending: INTERNAL MEDICINE
Payer: COMMERCIAL

## 2024-01-12 PROCEDURE — 93798 PHYS/QHP OP CAR RHAB W/ECG: CPT

## 2024-01-19 ENCOUNTER — TRANSFERRED RECORDS (OUTPATIENT)
Dept: CARDIOLOGY | Facility: CLINIC | Age: 64
End: 2024-01-19
Payer: COMMERCIAL

## 2024-01-22 ENCOUNTER — HOSPITAL ENCOUNTER (OUTPATIENT)
Dept: CARDIAC REHAB | Facility: HOSPITAL | Age: 64
Discharge: HOME | End: 2024-01-22
Attending: INTERNAL MEDICINE
Payer: COMMERCIAL

## 2024-01-22 PROCEDURE — 93798 PHYS/QHP OP CAR RHAB W/ECG: CPT

## 2024-01-26 ENCOUNTER — HOSPITAL ENCOUNTER (OUTPATIENT)
Dept: CARDIAC REHAB | Facility: HOSPITAL | Age: 64
Discharge: HOME | End: 2024-01-26
Attending: INTERNAL MEDICINE
Payer: COMMERCIAL

## 2024-01-26 PROCEDURE — 93798 PHYS/QHP OP CAR RHAB W/ECG: CPT

## 2024-01-29 ENCOUNTER — HOSPITAL ENCOUNTER (OUTPATIENT)
Dept: CARDIAC REHAB | Facility: HOSPITAL | Age: 64
Discharge: HOME | End: 2024-01-29
Attending: INTERNAL MEDICINE
Payer: COMMERCIAL

## 2024-01-29 PROCEDURE — 93798 PHYS/QHP OP CAR RHAB W/ECG: CPT

## 2024-02-02 ENCOUNTER — HOSPITAL ENCOUNTER (OUTPATIENT)
Dept: CARDIAC REHAB | Facility: HOSPITAL | Age: 64
Discharge: HOME | End: 2024-02-02
Attending: INTERNAL MEDICINE
Payer: COMMERCIAL

## 2024-02-02 LAB
MDC_IDC_LEAD_CONNECTION_STATUS: NORMAL
MDC_IDC_LEAD_IMPLANT_DT: NORMAL
MDC_IDC_LEAD_LOCATION: NORMAL
MDC_IDC_LEAD_LOCATION_DETAIL_1: NORMAL
MDC_IDC_LEAD_MFG: NORMAL
MDC_IDC_LEAD_MODEL: NORMAL
MDC_IDC_LEAD_POLARITY_TYPE: NORMAL
MDC_IDC_LEAD_SERIAL: NORMAL
MDC_IDC_LEAD_SPECIAL_FUNCTION: NORMAL
MDC_IDC_MSMT_BATTERY_DTM: NORMAL
MDC_IDC_MSMT_BATTERY_REMAINING_LONGEVITY: 128 MO
MDC_IDC_MSMT_BATTERY_RRT_TRIGGER: NORMAL
MDC_IDC_MSMT_BATTERY_VOLTAGE: 3.13 V
MDC_IDC_MSMT_CAP_CHARGE_ENERGY: 40 J
MDC_IDC_MSMT_CAP_CHARGE_TIME: 0 S
MDC_IDC_MSMT_CAP_CHARGE_TYPE: NORMAL
MDC_IDC_MSMT_LEADCHNL_LV_IMPEDANCE_VALUE: 266 OHM
MDC_IDC_MSMT_LEADCHNL_LV_IMPEDANCE_VALUE: 437 OHM
MDC_IDC_MSMT_LEADCHNL_LV_IMPEDANCE_VALUE: 627 OHM
MDC_IDC_MSMT_LEADCHNL_LV_IMPEDANCE_VALUE: 646 OHM
MDC_IDC_MSMT_LEADCHNL_LV_PACING_THRESHOLD_AMPLITUDE: 0.62 V
MDC_IDC_MSMT_LEADCHNL_LV_PACING_THRESHOLD_AMPLITUDE: 0.75 V
MDC_IDC_MSMT_LEADCHNL_LV_PACING_THRESHOLD_AMPLITUDE: 0.75 V
MDC_IDC_MSMT_LEADCHNL_LV_PACING_THRESHOLD_PULSEWIDTH: 0.4 MS
MDC_IDC_MSMT_LEADCHNL_RA_IMPEDANCE_VALUE: 456 OHM
MDC_IDC_MSMT_LEADCHNL_RA_IMPEDANCE_VALUE: 475 OHM
MDC_IDC_MSMT_LEADCHNL_RA_PACING_THRESHOLD_AMPLITUDE: 0.75 V
MDC_IDC_MSMT_LEADCHNL_RA_PACING_THRESHOLD_AMPLITUDE: 0.75 V
MDC_IDC_MSMT_LEADCHNL_RA_PACING_THRESHOLD_PULSEWIDTH: 0.4 MS
MDC_IDC_MSMT_LEADCHNL_RA_PACING_THRESHOLD_PULSEWIDTH: 0.4 MS
MDC_IDC_MSMT_LEADCHNL_RA_SENSING_INTR_AMPL: 3 MV
MDC_IDC_MSMT_LEADCHNL_RA_SENSING_INTR_AMPL: 3 MV
MDC_IDC_MSMT_LEADCHNL_RV_IMPEDANCE_VALUE: 304 OHM
MDC_IDC_MSMT_LEADCHNL_RV_IMPEDANCE_VALUE: 399 OHM
MDC_IDC_MSMT_LEADCHNL_RV_IMPEDANCE_VALUE: 456 OHM
MDC_IDC_MSMT_LEADCHNL_RV_PACING_THRESHOLD_AMPLITUDE: 1 V
MDC_IDC_MSMT_LEADCHNL_RV_PACING_THRESHOLD_AMPLITUDE: 1 V
MDC_IDC_MSMT_LEADCHNL_RV_PACING_THRESHOLD_PULSEWIDTH: 0.4 MS
MDC_IDC_MSMT_LEADCHNL_RV_PACING_THRESHOLD_PULSEWIDTH: 0.4 MS
MDC_IDC_MSMT_LEADCHNL_RV_SENSING_INTR_AMPL: 15.8 MV
MDC_IDC_MSMT_LEADCHNL_RV_SENSING_INTR_AMPL: 15.8 MV
MDC_IDC_PG_IMPLANT_DTM: NORMAL
MDC_IDC_PG_MFG: NORMAL
MDC_IDC_PG_MODEL: NORMAL
MDC_IDC_PG_SERIAL: NORMAL
MDC_IDC_PG_TYPE: NORMAL
MDC_IDC_SESS_CLINIC_NAME: NORMAL
MDC_IDC_SESS_DTM: NORMAL
MDC_IDC_SESS_TYPE: NORMAL
MDC_IDC_SET_BRADY_AT_MODE_SWITCH_RATE: 171 {BEATS}/MIN
MDC_IDC_SET_BRADY_LOWRATE: 60 {BEATS}/MIN
MDC_IDC_SET_BRADY_MAX_SENSOR_RATE: 120 {BEATS}/MIN
MDC_IDC_SET_BRADY_MAX_TRACKING_RATE: 130 {BEATS}/MIN
MDC_IDC_SET_BRADY_MODE: NORMAL
MDC_IDC_SET_BRADY_PAV_DELAY_HIGH: 100 MS
MDC_IDC_SET_BRADY_PAV_DELAY_LOW: 130 MS
MDC_IDC_SET_BRADY_SAV_DELAY_HIGH: 70 MS
MDC_IDC_SET_BRADY_SAV_DELAY_LOW: 100 MS
MDC_IDC_SET_CRT_PACED_CHAMBERS: NORMAL
MDC_IDC_SET_LEADCHNL_LV_PACING_AMPLITUDE: NORMAL
MDC_IDC_SET_LEADCHNL_LV_PACING_ANODE_ELECTRODE_1: NORMAL
MDC_IDC_SET_LEADCHNL_LV_PACING_ANODE_LOCATION_1: NORMAL
MDC_IDC_SET_LEADCHNL_LV_PACING_CAPTURE_MODE: NORMAL
MDC_IDC_SET_LEADCHNL_LV_PACING_CATHODE_ELECTRODE_1: NORMAL
MDC_IDC_SET_LEADCHNL_LV_PACING_CATHODE_LOCATION_1: NORMAL
MDC_IDC_SET_LEADCHNL_LV_PACING_POLARITY: NORMAL
MDC_IDC_SET_LEADCHNL_LV_PACING_PULSEWIDTH: 0.4 MS
MDC_IDC_SET_LEADCHNL_RA_PACING_AMPLITUDE: 2.25 V
MDC_IDC_SET_LEADCHNL_RA_PACING_ANODE_ELECTRODE_1: NORMAL
MDC_IDC_SET_LEADCHNL_RA_PACING_ANODE_LOCATION_1: NORMAL
MDC_IDC_SET_LEADCHNL_RA_PACING_CATHODE_ELECTRODE_1: NORMAL
MDC_IDC_SET_LEADCHNL_RA_PACING_CATHODE_LOCATION_1: NORMAL
MDC_IDC_SET_LEADCHNL_RA_PACING_POLARITY: NORMAL
MDC_IDC_SET_LEADCHNL_RA_PACING_PULSEWIDTH: 0.4 MS
MDC_IDC_SET_LEADCHNL_RA_SENSING_ANODE_ELECTRODE_1: NORMAL
MDC_IDC_SET_LEADCHNL_RA_SENSING_ANODE_LOCATION_1: NORMAL
MDC_IDC_SET_LEADCHNL_RA_SENSING_CATHODE_ELECTRODE_1: NORMAL
MDC_IDC_SET_LEADCHNL_RA_SENSING_CATHODE_LOCATION_1: NORMAL
MDC_IDC_SET_LEADCHNL_RA_SENSING_POLARITY: NORMAL
MDC_IDC_SET_LEADCHNL_RA_SENSING_SENSITIVITY: 0.3 MV
MDC_IDC_SET_LEADCHNL_RV_SENSING_ANODE_ELECTRODE_1: NORMAL
MDC_IDC_SET_LEADCHNL_RV_SENSING_ANODE_LOCATION_1: NORMAL
MDC_IDC_SET_LEADCHNL_RV_SENSING_CATHODE_ELECTRODE_1: NORMAL
MDC_IDC_SET_LEADCHNL_RV_SENSING_CATHODE_LOCATION_1: NORMAL
MDC_IDC_SET_LEADCHNL_RV_SENSING_POLARITY: NORMAL
MDC_IDC_SET_LEADCHNL_RV_SENSING_SENSITIVITY: 0.3 MV
MDC_IDC_SET_ZONE_DETECTION_BEATS_DENOMINATOR: 16 {BEATS}
MDC_IDC_SET_ZONE_DETECTION_BEATS_DENOMINATOR: 32 {BEATS}
MDC_IDC_SET_ZONE_DETECTION_BEATS_DENOMINATOR: 40 {BEATS}
MDC_IDC_SET_ZONE_DETECTION_BEATS_NUMERATOR: 16 {BEATS}
MDC_IDC_SET_ZONE_DETECTION_BEATS_NUMERATOR: 30 {BEATS}
MDC_IDC_SET_ZONE_DETECTION_BEATS_NUMERATOR: 32 {BEATS}
MDC_IDC_SET_ZONE_DETECTION_INTERVAL: 320 MS
MDC_IDC_SET_ZONE_DETECTION_INTERVAL: 350 MS
MDC_IDC_SET_ZONE_DETECTION_INTERVAL: 360 MS
MDC_IDC_SET_ZONE_DETECTION_INTERVAL: 400 MS
MDC_IDC_SET_ZONE_STATUS: NORMAL
MDC_IDC_SET_ZONE_TYPE: NORMAL
MDC_IDC_SET_ZONE_VENDOR_TYPE: NORMAL
MDC_IDC_STAT_AT_BURDEN_PERCENT: 0 %
MDC_IDC_STAT_AT_DTM_END: NORMAL
MDC_IDC_STAT_AT_DTM_START: NORMAL
MDC_IDC_STAT_AT_MODE_SW_COUNT: 0
MDC_IDC_STAT_BRADY_AP_VP_PERCENT: 0.35 %
MDC_IDC_STAT_BRADY_AP_VS_PERCENT: 0.01 %
MDC_IDC_STAT_BRADY_AS_VP_PERCENT: 97.25 %
MDC_IDC_STAT_BRADY_AS_VS_PERCENT: 2.4 %
MDC_IDC_STAT_BRADY_DTM_END: NORMAL
MDC_IDC_STAT_BRADY_DTM_START: NORMAL
MDC_IDC_STAT_BRADY_RA_PERCENT_PACED: 0.42 %
MDC_IDC_STAT_BRADY_RV_PERCENT_PACED: 0 %
MDC_IDC_STAT_CRT_DTM_END: NORMAL
MDC_IDC_STAT_CRT_DTM_START: NORMAL
MDC_IDC_STAT_CRT_LV_PERCENT_PACED: 97.59 %
MDC_IDC_STAT_CRT_PERCENT_PACED: 0 %
MDC_IDC_STAT_EPISODE_RECENT_COUNT: 0
MDC_IDC_STAT_EPISODE_RECENT_COUNT_DTM_END: NORMAL
MDC_IDC_STAT_EPISODE_RECENT_COUNT_DTM_START: NORMAL
MDC_IDC_STAT_EPISODE_TOTAL_COUNT: 0
MDC_IDC_STAT_EPISODE_TOTAL_COUNT_DTM_END: NORMAL
MDC_IDC_STAT_EPISODE_TOTAL_COUNT_DTM_START: NORMAL
MDC_IDC_STAT_EPISODE_TYPE: NORMAL
MDC_IDC_STAT_TACHYTHERAPY_ATP_DELIVERED_RECENT: 0
MDC_IDC_STAT_TACHYTHERAPY_ATP_DELIVERED_TOTAL: 0
MDC_IDC_STAT_TACHYTHERAPY_RECENT_DTM_END: NORMAL
MDC_IDC_STAT_TACHYTHERAPY_RECENT_DTM_START: NORMAL
MDC_IDC_STAT_TACHYTHERAPY_SHOCKS_ABORTED_RECENT: 0
MDC_IDC_STAT_TACHYTHERAPY_SHOCKS_ABORTED_TOTAL: 0
MDC_IDC_STAT_TACHYTHERAPY_SHOCKS_DELIVERED_RECENT: 0
MDC_IDC_STAT_TACHYTHERAPY_SHOCKS_DELIVERED_TOTAL: 0
MDC_IDC_STAT_TACHYTHERAPY_TOTAL_DTM_END: NORMAL
MDC_IDC_STAT_TACHYTHERAPY_TOTAL_DTM_START: NORMAL

## 2024-02-02 PROCEDURE — 93798 PHYS/QHP OP CAR RHAB W/ECG: CPT

## 2024-02-02 PROCEDURE — 93284 PRGRMG EVAL IMPLANTABLE DFB: CPT | Performed by: INTERNAL MEDICINE

## 2024-02-06 DIAGNOSIS — I25.5 ISCHEMIC CARDIOMYOPATHY: Primary | ICD-10-CM

## 2024-02-06 DIAGNOSIS — Z95.810 CARDIAC RESYNCHRONIZATION THERAPY DEFIBRILLATOR (CRT-D) IN PLACE: ICD-10-CM

## 2024-02-07 ENCOUNTER — HOSPITAL ENCOUNTER (OUTPATIENT)
Dept: CARDIAC REHAB | Facility: HOSPITAL | Age: 64
Discharge: HOME | End: 2024-02-07
Attending: INTERNAL MEDICINE
Payer: COMMERCIAL

## 2024-02-07 PROCEDURE — 93798 PHYS/QHP OP CAR RHAB W/ECG: CPT

## 2024-02-08 ENCOUNTER — APPOINTMENT (OUTPATIENT)
Dept: CARDIOLOGY | Facility: CLINIC | Age: 64
End: 2024-02-08
Payer: COMMERCIAL

## 2024-02-08 ENCOUNTER — OFFICE VISIT (OUTPATIENT)
Dept: CARDIOLOGY | Facility: CLINIC | Age: 64
End: 2024-02-08
Attending: INTERNAL MEDICINE
Payer: COMMERCIAL

## 2024-02-08 VITALS
RESPIRATION RATE: 16 BRPM | BODY MASS INDEX: 19.3 KG/M2 | HEART RATE: 67 BPM | SYSTOLIC BLOOD PRESSURE: 100 MMHG | HEIGHT: 70 IN | DIASTOLIC BLOOD PRESSURE: 62 MMHG | WEIGHT: 134.8 LBS

## 2024-02-08 DIAGNOSIS — R93.1 LVEF <40%: ICD-10-CM

## 2024-02-08 DIAGNOSIS — I10 PRIMARY HYPERTENSION: ICD-10-CM

## 2024-02-08 DIAGNOSIS — Z95.810 ICD (IMPLANTABLE CARDIOVERTER-DEFIBRILLATOR) IN PLACE: ICD-10-CM

## 2024-02-08 DIAGNOSIS — I51.4 MYOCARDITIS, UNSPECIFIED CHRONICITY, UNSPECIFIED MYOCARDITIS TYPE (H): ICD-10-CM

## 2024-02-08 DIAGNOSIS — Z95.810 CARDIAC RESYNCHRONIZATION THERAPY DEFIBRILLATOR (CRT-D) IN PLACE: Primary | ICD-10-CM

## 2024-02-08 DIAGNOSIS — I25.5 ISCHEMIC CARDIOMYOPATHY: ICD-10-CM

## 2024-02-08 DIAGNOSIS — I42.8 NONISCHEMIC CARDIOMYOPATHY (H): ICD-10-CM

## 2024-02-08 DIAGNOSIS — I50.82 BIVENTRICULAR HEART FAILURE (H): Primary | ICD-10-CM

## 2024-02-08 DIAGNOSIS — E03.9 HYPOTHYROIDISM, UNSPECIFIED TYPE: ICD-10-CM

## 2024-02-08 DIAGNOSIS — I46.9 CARDIAC ARREST (H): ICD-10-CM

## 2024-02-08 LAB
ANION GAP SERPL CALCULATED.3IONS-SCNC: 12 MMOL/L (ref 7–15)
BUN SERPL-MCNC: 11.5 MG/DL (ref 8–23)
CALCIUM SERPL-MCNC: 9.5 MG/DL (ref 8.8–10.2)
CHLORIDE SERPL-SCNC: 100 MMOL/L (ref 98–107)
CREAT SERPL-MCNC: 0.75 MG/DL (ref 0.51–0.95)
DEPRECATED HCO3 PLAS-SCNC: 28 MMOL/L (ref 22–29)
EGFRCR SERPLBLD CKD-EPI 2021: 88 ML/MIN/1.73M2
GLUCOSE SERPL-MCNC: 96 MG/DL (ref 70–99)
MAGNESIUM SERPL-MCNC: 2.1 MG/DL (ref 1.7–2.3)
MDC_IDC_EPISODE_DTM: NORMAL
MDC_IDC_EPISODE_DURATION: 14 S
MDC_IDC_EPISODE_ID: 1
MDC_IDC_EPISODE_TYPE: NORMAL
MDC_IDC_LEAD_CONNECTION_STATUS: NORMAL
MDC_IDC_LEAD_IMPLANT_DT: NORMAL
MDC_IDC_LEAD_LOCATION: NORMAL
MDC_IDC_LEAD_LOCATION_DETAIL_1: NORMAL
MDC_IDC_LEAD_MFG: NORMAL
MDC_IDC_LEAD_MODEL: NORMAL
MDC_IDC_LEAD_POLARITY_TYPE: NORMAL
MDC_IDC_LEAD_SERIAL: NORMAL
MDC_IDC_LEAD_SPECIAL_FUNCTION: NORMAL
MDC_IDC_MSMT_BATTERY_DTM: NORMAL
MDC_IDC_MSMT_BATTERY_REMAINING_LONGEVITY: 141 MO
MDC_IDC_MSMT_BATTERY_RRT_TRIGGER: NORMAL
MDC_IDC_MSMT_BATTERY_VOLTAGE: 3.13 V
MDC_IDC_MSMT_CAP_CHARGE_DTM: NORMAL
MDC_IDC_MSMT_CAP_CHARGE_ENERGY: 18 J
MDC_IDC_MSMT_CAP_CHARGE_TIME: 3.6 S
MDC_IDC_MSMT_CAP_CHARGE_TYPE: NORMAL
MDC_IDC_MSMT_LEADCHNL_LV_IMPEDANCE_VALUE: 323 OHM
MDC_IDC_MSMT_LEADCHNL_LV_IMPEDANCE_VALUE: 475 OHM
MDC_IDC_MSMT_LEADCHNL_LV_IMPEDANCE_VALUE: 703 OHM
MDC_IDC_MSMT_LEADCHNL_LV_IMPEDANCE_VALUE: 779 OHM
MDC_IDC_MSMT_LEADCHNL_LV_PACING_THRESHOLD_AMPLITUDE: 0.75 V
MDC_IDC_MSMT_LEADCHNL_LV_PACING_THRESHOLD_AMPLITUDE: 0.75 V
MDC_IDC_MSMT_LEADCHNL_LV_PACING_THRESHOLD_AMPLITUDE: 0.88 V
MDC_IDC_MSMT_LEADCHNL_LV_PACING_THRESHOLD_PULSEWIDTH: 0.4 MS
MDC_IDC_MSMT_LEADCHNL_RA_IMPEDANCE_VALUE: 570 OHM
MDC_IDC_MSMT_LEADCHNL_RA_IMPEDANCE_VALUE: 589 OHM
MDC_IDC_MSMT_LEADCHNL_RA_PACING_THRESHOLD_AMPLITUDE: 0.5 V
MDC_IDC_MSMT_LEADCHNL_RA_PACING_THRESHOLD_AMPLITUDE: 0.5 V
MDC_IDC_MSMT_LEADCHNL_RA_PACING_THRESHOLD_PULSEWIDTH: 0.4 MS
MDC_IDC_MSMT_LEADCHNL_RA_PACING_THRESHOLD_PULSEWIDTH: 0.4 MS
MDC_IDC_MSMT_LEADCHNL_RA_SENSING_INTR_AMPL: 3.4 MV
MDC_IDC_MSMT_LEADCHNL_RA_SENSING_INTR_AMPL: 3.4 MV
MDC_IDC_MSMT_LEADCHNL_RV_IMPEDANCE_VALUE: 323 OHM
MDC_IDC_MSMT_LEADCHNL_RV_IMPEDANCE_VALUE: 437 OHM
MDC_IDC_MSMT_LEADCHNL_RV_IMPEDANCE_VALUE: 475 OHM
MDC_IDC_MSMT_LEADCHNL_RV_PACING_THRESHOLD_AMPLITUDE: 1.25 V
MDC_IDC_MSMT_LEADCHNL_RV_PACING_THRESHOLD_AMPLITUDE: 1.25 V
MDC_IDC_MSMT_LEADCHNL_RV_PACING_THRESHOLD_PULSEWIDTH: 0.4 MS
MDC_IDC_MSMT_LEADCHNL_RV_PACING_THRESHOLD_PULSEWIDTH: 0.4 MS
MDC_IDC_MSMT_LEADCHNL_RV_SENSING_INTR_AMPL: 16.9 MV
MDC_IDC_MSMT_LEADCHNL_RV_SENSING_INTR_AMPL: 16.9 MV
MDC_IDC_PG_IMPLANT_DTM: NORMAL
MDC_IDC_PG_MFG: NORMAL
MDC_IDC_PG_MODEL: NORMAL
MDC_IDC_PG_SERIAL: NORMAL
MDC_IDC_PG_TYPE: NORMAL
MDC_IDC_SESS_CLINIC_NAME: NORMAL
MDC_IDC_SESS_DTM: NORMAL
MDC_IDC_SESS_TYPE: NORMAL
MDC_IDC_SET_BRADY_AT_MODE_SWITCH_RATE: 171 {BEATS}/MIN
MDC_IDC_SET_BRADY_LOWRATE: 60 {BEATS}/MIN
MDC_IDC_SET_BRADY_MAX_SENSOR_RATE: 120 {BEATS}/MIN
MDC_IDC_SET_BRADY_MAX_TRACKING_RATE: 130 {BEATS}/MIN
MDC_IDC_SET_BRADY_MODE: NORMAL
MDC_IDC_SET_BRADY_PAV_DELAY_HIGH: 100 MS
MDC_IDC_SET_BRADY_PAV_DELAY_LOW: 130 MS
MDC_IDC_SET_BRADY_SAV_DELAY_HIGH: 70 MS
MDC_IDC_SET_BRADY_SAV_DELAY_LOW: 100 MS
MDC_IDC_SET_CRT_PACED_CHAMBERS: NORMAL
MDC_IDC_SET_LEADCHNL_LV_PACING_AMPLITUDE: NORMAL
MDC_IDC_SET_LEADCHNL_LV_PACING_ANODE_ELECTRODE_1: NORMAL
MDC_IDC_SET_LEADCHNL_LV_PACING_ANODE_LOCATION_1: NORMAL
MDC_IDC_SET_LEADCHNL_LV_PACING_CAPTURE_MODE: NORMAL
MDC_IDC_SET_LEADCHNL_LV_PACING_CATHODE_ELECTRODE_1: NORMAL
MDC_IDC_SET_LEADCHNL_LV_PACING_CATHODE_LOCATION_1: NORMAL
MDC_IDC_SET_LEADCHNL_LV_PACING_POLARITY: NORMAL
MDC_IDC_SET_LEADCHNL_LV_PACING_PULSEWIDTH: 0.4 MS
MDC_IDC_SET_LEADCHNL_RA_PACING_AMPLITUDE: 1.5 V
MDC_IDC_SET_LEADCHNL_RA_PACING_ANODE_ELECTRODE_1: NORMAL
MDC_IDC_SET_LEADCHNL_RA_PACING_ANODE_LOCATION_1: NORMAL
MDC_IDC_SET_LEADCHNL_RA_PACING_CATHODE_ELECTRODE_1: NORMAL
MDC_IDC_SET_LEADCHNL_RA_PACING_CATHODE_LOCATION_1: NORMAL
MDC_IDC_SET_LEADCHNL_RA_PACING_POLARITY: NORMAL
MDC_IDC_SET_LEADCHNL_RA_PACING_PULSEWIDTH: 0.4 MS
MDC_IDC_SET_LEADCHNL_RA_SENSING_ANODE_ELECTRODE_1: NORMAL
MDC_IDC_SET_LEADCHNL_RA_SENSING_ANODE_LOCATION_1: NORMAL
MDC_IDC_SET_LEADCHNL_RA_SENSING_CATHODE_ELECTRODE_1: NORMAL
MDC_IDC_SET_LEADCHNL_RA_SENSING_CATHODE_LOCATION_1: NORMAL
MDC_IDC_SET_LEADCHNL_RA_SENSING_POLARITY: NORMAL
MDC_IDC_SET_LEADCHNL_RA_SENSING_SENSITIVITY: 0.3 MV
MDC_IDC_SET_LEADCHNL_RV_SENSING_ANODE_ELECTRODE_1: NORMAL
MDC_IDC_SET_LEADCHNL_RV_SENSING_ANODE_LOCATION_1: NORMAL
MDC_IDC_SET_LEADCHNL_RV_SENSING_CATHODE_ELECTRODE_1: NORMAL
MDC_IDC_SET_LEADCHNL_RV_SENSING_CATHODE_LOCATION_1: NORMAL
MDC_IDC_SET_LEADCHNL_RV_SENSING_POLARITY: NORMAL
MDC_IDC_SET_LEADCHNL_RV_SENSING_SENSITIVITY: 0.3 MV
MDC_IDC_SET_ZONE_DETECTION_BEATS_DENOMINATOR: 16 {BEATS}
MDC_IDC_SET_ZONE_DETECTION_BEATS_DENOMINATOR: 32 {BEATS}
MDC_IDC_SET_ZONE_DETECTION_BEATS_DENOMINATOR: 40 {BEATS}
MDC_IDC_SET_ZONE_DETECTION_BEATS_NUMERATOR: 16 {BEATS}
MDC_IDC_SET_ZONE_DETECTION_BEATS_NUMERATOR: 30 {BEATS}
MDC_IDC_SET_ZONE_DETECTION_BEATS_NUMERATOR: 32 {BEATS}
MDC_IDC_SET_ZONE_DETECTION_INTERVAL: 320 MS
MDC_IDC_SET_ZONE_DETECTION_INTERVAL: 350 MS
MDC_IDC_SET_ZONE_DETECTION_INTERVAL: 360 MS
MDC_IDC_SET_ZONE_DETECTION_INTERVAL: 400 MS
MDC_IDC_SET_ZONE_STATUS: NORMAL
MDC_IDC_SET_ZONE_TYPE: NORMAL
MDC_IDC_SET_ZONE_VENDOR_TYPE: NORMAL
MDC_IDC_STAT_AT_BURDEN_PERCENT: 0 %
MDC_IDC_STAT_AT_DTM_END: NORMAL
MDC_IDC_STAT_AT_DTM_START: NORMAL
MDC_IDC_STAT_AT_MODE_SW_COUNT: 0
MDC_IDC_STAT_BRADY_AP_VP_PERCENT: 1.34 %
MDC_IDC_STAT_BRADY_AP_VS_PERCENT: 0.01 %
MDC_IDC_STAT_BRADY_AS_VP_PERCENT: 98.06 %
MDC_IDC_STAT_BRADY_AS_VS_PERCENT: 0.59 %
MDC_IDC_STAT_BRADY_DTM_END: NORMAL
MDC_IDC_STAT_BRADY_DTM_START: NORMAL
MDC_IDC_STAT_BRADY_RA_PERCENT_PACED: 1.39 %
MDC_IDC_STAT_BRADY_RV_PERCENT_PACED: 0 %
MDC_IDC_STAT_CRT_DTM_END: NORMAL
MDC_IDC_STAT_CRT_DTM_START: NORMAL
MDC_IDC_STAT_CRT_LV_PERCENT_PACED: 99.4 %
MDC_IDC_STAT_CRT_PERCENT_PACED: 0 %
MDC_IDC_STAT_EPISODE_RECENT_COUNT: 0
MDC_IDC_STAT_EPISODE_RECENT_COUNT: 1
MDC_IDC_STAT_EPISODE_RECENT_COUNT_DTM_END: NORMAL
MDC_IDC_STAT_EPISODE_RECENT_COUNT_DTM_START: NORMAL
MDC_IDC_STAT_EPISODE_TOTAL_COUNT: 0
MDC_IDC_STAT_EPISODE_TOTAL_COUNT: 1
MDC_IDC_STAT_EPISODE_TOTAL_COUNT_DTM_END: NORMAL
MDC_IDC_STAT_EPISODE_TOTAL_COUNT_DTM_START: NORMAL
MDC_IDC_STAT_EPISODE_TYPE: NORMAL
MDC_IDC_STAT_TACHYTHERAPY_ATP_DELIVERED_RECENT: 0
MDC_IDC_STAT_TACHYTHERAPY_ATP_DELIVERED_TOTAL: 0
MDC_IDC_STAT_TACHYTHERAPY_RECENT_DTM_END: NORMAL
MDC_IDC_STAT_TACHYTHERAPY_RECENT_DTM_START: NORMAL
MDC_IDC_STAT_TACHYTHERAPY_SHOCKS_ABORTED_RECENT: 0
MDC_IDC_STAT_TACHYTHERAPY_SHOCKS_ABORTED_TOTAL: 0
MDC_IDC_STAT_TACHYTHERAPY_SHOCKS_DELIVERED_RECENT: 0
MDC_IDC_STAT_TACHYTHERAPY_SHOCKS_DELIVERED_TOTAL: 0
MDC_IDC_STAT_TACHYTHERAPY_TOTAL_DTM_END: NORMAL
MDC_IDC_STAT_TACHYTHERAPY_TOTAL_DTM_START: NORMAL
POTASSIUM SERPL-SCNC: 5 MMOL/L (ref 3.4–5.3)
SODIUM SERPL-SCNC: 140 MMOL/L (ref 135–145)
TSH SERPL DL<=0.005 MIU/L-ACNC: 4.42 UIU/ML (ref 0.3–4.2)

## 2024-02-08 PROCEDURE — 93284 PRGRMG EVAL IMPLANTABLE DFB: CPT | Performed by: INTERNAL MEDICINE

## 2024-02-08 PROCEDURE — 84443 ASSAY THYROID STIM HORMONE: CPT | Performed by: NURSE PRACTITIONER

## 2024-02-08 PROCEDURE — 99215 OFFICE O/P EST HI 40 MIN: CPT | Performed by: NURSE PRACTITIONER

## 2024-02-08 PROCEDURE — 36415 COLL VENOUS BLD VENIPUNCTURE: CPT | Performed by: NURSE PRACTITIONER

## 2024-02-08 PROCEDURE — 80048 BASIC METABOLIC PNL TOTAL CA: CPT | Performed by: NURSE PRACTITIONER

## 2024-02-08 PROCEDURE — 83735 ASSAY OF MAGNESIUM: CPT | Performed by: NURSE PRACTITIONER

## 2024-02-08 RX ORDER — METOPROLOL SUCCINATE 50 MG/1
50 TABLET, EXTENDED RELEASE ORAL 2 TIMES DAILY
Qty: 180 TABLET | Refills: 3 | Status: SHIPPED | OUTPATIENT
Start: 2024-02-08 | End: 2024-03-01

## 2024-02-08 RX ORDER — LORATADINE 10 MG/1
TABLET ORAL
COMMUNITY
End: 2024-05-14 | Stop reason: ALTCHOICE

## 2024-02-08 RX ORDER — LOSARTAN POTASSIUM 25 MG/1
25 TABLET ORAL DAILY
Qty: 90 TABLET | Refills: 3 | Status: SHIPPED | OUTPATIENT
Start: 2024-02-08

## 2024-02-08 NOTE — LETTER
2/8/2024    Physician No Ref-Primary  No address on file    RE: Sherry King       Dear Colleague,     I had the pleasure of seeing Sherry King in the [facility].        Assessment/Recommendations   Assessment:    1. Nonischemic cardiomyopathy, heart failure with reduced ejection fraction, biventricular heart failure, LVEF 21%, RVEF 22%, NYHA class II: Compensated.  She has fatigue.  Denies dyspnea on exertion, orthopnea, PND or edema.  Her weight is stable.  We discussed the results of her cardiac MRI.  Status post CRT-D December 28, 2023.  BiV pacing today at 99.4%.  She has not returned to work and is needing [facility] paperwork completed  2.  Hypertension: Controlled.  Blood pressure 100/62.  She has occasional lightheadedness.    Plan:  1.   Heart failure medications:  - Beta blocker therapy with metoprolol succinate 50 mg twice a day  - ARB therapy with losartan 25 mg daily  2.  Unable to titrate medications due to hypotension and occasional lightheadedness  3.  Continue current medications  4.  Continue monitoring weights, following a low-sodium diet and exercise  5.  BMP, magnesium and TSH pending  6.  Will complete [facility] paperwork    Sherry King will follow up with Dr. Huggins in 3 months per patient request and in the heart failure clinic in 1 month.     History of Present Illness/Subjective    Ms. Sherry King is a 63 year old female seen at [facility] heart failure clinic today for continued follow-up.  Her daughter accompanies her today.  She follows up for biventricular heart failure, nonischemic cardiomyopathy, myocarditis.  She was hospitalized December 23 to December 29 due to chest tightness, shortness of breath and tachycardia.  Heart rate was in the 150s.  She had a VT arrest and CODE BLUE was called and CPR was initiated.  She also had torsades.  She received CPR for 15 minutes.  He was given IV magnesium and shocked with 200 J.  Within a minute patient was alert.   He had a cardiac MRI which showed biventricular heart failure with LVEF 21% and RVEF 22% and left bundle branch block.  Also showed myocarditis.  She had a coronary angiogram which showed no coronary artery disease.  She had a CRT-D placed on December 28.  She has a past medical history significant for hypertension, CRT-D, LBBB.    Today, she has fatigue and occasional lightheadedness.  She denies shortness of breath, dyspnea on exertion, orthopnea, PND, palpitations, chest pain, abdominal fullness/bloating, and lower extremity edema.      She is monitoring home weights which are stable around 131 pounds.  She is following a low sodium diet.  She participates in regular physical activity including cardiac rehab a few days a week.         Cardiac MRI: 12/26/2023-reviewed  SUMMARY   ==========================================================================================================     1.  Left ventricle is moderately enlarged with severe reduction in global systolic function. There is  severe desynchrony related to conduction abnormality (LBBB).  Normal wall thickness .  The quantified left  ventricular ejection fraction is 21%.  There is areas of patchy epicardial and mid myocardial  enhancement/fibrosis on LGE imaging most consistent with myocarditis.  Pre-contrast T1 is abnormal and  elevated at 1160 ms.  ECV: 33.6% (elevated, abnormal).   2.  Mild enlargement of right ventricle with severe reduction in systolic function.  RVEF:22%  3.  No significant valvular abnormalities.  4.  Normal right and left atrial size.   5.  There is a small pericardial effusion.       Coronary angiogram: 12/26/2023-reviewed  Pre Procedure Diagnosis    Cardiomyopathy  Ventricular tachycardia    Post Procedure Diagnosis    Nonischemic cardiomyopathy      Conclusion    1.  Large-caliber normal-appearing epicardial coronary arteries in a right dominant system.         Recommendations    General Recommendations:  - Patient given  "specific instructions regarding care of arteriotomy site, activity restrictions, signs and symptoms of cardiac or vascular complications and to seek immediate medical evaluation should they occur.   - Arterial sheath removed from radial artery with TR Band placement.        Physical Examination Review of Systems   /62 (BP Location: Right arm, Patient Position: Sitting, Cuff Size: Adult Small)   Pulse 67   Resp 16   Ht 1.778 m (5' 10\")   Wt 61.1 kg (134 lb 12.8 oz)   BMI 19.34 kg/m    Body mass index is 19.34 kg/m .  Wt Readings from Last 3 Encounters:   02/08/24 61.1 kg (134 lb 12.8 oz)   12/29/23 61.7 kg (136 lb)   12/23/23 62.1 kg (137 lb)       General Appearance:   no acute distress   ENT/Mouth: No abnormalities   EYES:  no scleral icterus, normal conjunctivae   Neck: no thyromegaly   Chest/Lungs:   lungs are clear to auscultation, no rales or wheezing, equal chest wall expansion    Cardiovascular:   Regular. Normal first and second heart sounds with no murmurs, rubs, or gallops, no edema bilaterally    Abdomen:  bowel sounds are present   Extremities: no cyanosis or clubbing   Skin: warm   Neurologic: no tremors     Psychiatric: alert and oriented x3    Enc Vitals  BP: 100/62  Pulse: 67  Resp: 16  Weight: 61.1 kg (134 lb 12.8 oz)  Height: 177.8 cm (5' 10\")                                         Medical History  Surgical History Family History Social History   Past Medical History:   Diagnosis Date    Biventricular heart failure (H) 02/08/2024    Cardiac arrest (H) 02/08/2024    Cardiac resynchronization therapy defibrillator (CRT-D) in place 12/29/2023    Congestive heart failure (H)     Hypertension     Nonischemic cardiomyopathy (H) 02/08/2024    Primary hypertension 02/08/2024    Thyroid disease     Past Surgical History:   Procedure Laterality Date    CORONARY ANGIOGRAPHY ADULT ORDER      CV CORONARY ANGIOGRAM N/A 12/26/2023    Procedure: Coronary Angiogram;  Surgeon: Simon Kwon MD;  " Location: [facility] LAB CV    EP ICD & LEAD IMPLANT-HIS LEAD BIVENTRICULAR N/A 12/28/2023    Procedure: Implantable Cardioverter Defibrillator Device & Lead Implant - HIS Lead Biventricular;  Surgeon: Lior Moreno MD;  Location: [facility] CV    IMPLANT AUTOMATIC IMPLANTABLE CARDIOVERTER DEFIBRILLATOR      PICC TRIPLE LUMEN PLACEMENT  12/25/2023    No family history on file. Social History     Socioeconomic History    Marital status:      Spouse name: Not on file    Number of children: Not on file    Years of education: Not on file    Highest education level: Not on file   Occupational History    Not on file   Tobacco Use    Smoking status: Never     Passive exposure: Never    Smokeless tobacco: Never   Substance and Sexual Activity    Alcohol use: Not on file    Drug use: Not on file    Sexual activity: Not on file   Other Topics Concern    Not on file   Social History Narrative    Not on file     Social Determinants of Health     Financial Resource Strain: Not on file   Food Insecurity: Not on file   Transportation Needs: Not on file   Physical Activity: Not on file   Stress: Not on file   Social Connections: Not on file   Interpersonal Safety: Not on file   Housing Stability: Not on file          Medications  Allergies   Current Outpatient Medications   Medication Sig Dispense Refill    BIOTIN PO Take 1 tablet by mouth daily as needed      Cholecalciferol (VITAMIN D-3 PO) Take 1 tablet by mouth daily as needed      DENTA 5000 PLUS 1.1 % CREA daily      losartan (COZAAR) 25 MG tablet Take 1 tablet (25 mg) by mouth daily 90 tablet 3    MAGNESIUM GLYCINATE PO Take 2 tablets by mouth daily      metoprolol succinate ER (TOPROL XL) 50 MG 24 hr tablet Take 1 tablet (50 mg) by mouth 2 times daily 180 tablet 3    MILK THISTLE PO Take 1 tablet by mouth daily as needed      Turmeric (QC TUMERIC COMPLEX PO) Take 1 tablet by mouth daily as needed      vitamin B complex with vitamin C (VITAMIN  B COMPLEX)  tablet as directed Orally      loratadine (CLARITIN) 10 MG tablet 1 tablet Orally Once a day (Patient not taking: Reported on 2/8/2024)      Allergies   Allergen Reactions    Lanolin Rash         Lab Results    Chemistry/lipid CBC Cardiac Enzymes/BNP/TSH/INR   Lab Results   Component Value Date    CHOL 216 (H) 12/24/2023     12/24/2023    TRIG 76 12/24/2023    BUN 10.9 12/29/2023     12/29/2023    CO2 27 12/29/2023    Lab Results   Component Value Date    WBC 5.4 12/29/2023    HGB 12.0 12/29/2023    HCT 36.3 12/29/2023    MCV 99 12/29/2023     (L) 12/29/2023    Lab Results   Component Value Date    TSH 7.94 (H) 12/23/2023             This note has been dictated using voice recognition software. Any grammatical, typographical, or context distortions are unintentional and inherent to the software    40 minutes spent on the date of encounter doing chart review, review of outside records, review of test results, interpretation with above tests, patient visit, documentation, and discussion with family.                  Thank you for allowing me to participate in the care of your patient.      Sincerely,     SHELLEY Montesinos [facility]  cc:   Lior Moreno MD  [address]  [place] [address]

## 2024-02-08 NOTE — PROGRESS NOTES
Assessment/Recommendations   Assessment:    1. Nonischemic cardiomyopathy, heart failure with reduced ejection fraction, biventricular heart failure, LVEF 21%, RVEF 22%, NYHA class II: Compensated.  She has fatigue.  Denies dyspnea on exertion, orthopnea, PND or edema.  Her weight is stable.  We discussed the results of her cardiac MRI.  Status post CRT-D December 28, 2023.  BiV pacing today at 99.4%.  She has not returned to work and is needing [facility] paperwork completed  2.  Hypertension: Controlled.  Blood pressure 100/62.  She has occasional lightheadedness.    Plan:  1.   Heart failure medications:  - Beta blocker therapy with metoprolol succinate 50 mg twice a day  - ARB therapy with losartan 25 mg daily  2.  Unable to titrate medications due to hypotension and occasional lightheadedness  3.  Continue current medications  4.  Continue monitoring weights, following a low-sodium diet and exercise  5.  BMP, magnesium and TSH pending  6.  Will complete [facility] paperwork  7.  Schedule echocardiogram late March to reassess LVEF post CRT-D implant  8.  Continue cardiac rehab    Sherry King will follow up with Dr. Huggins in 3 months per patient request and in the heart failure clinic in 1 month.     History of Present Illness/Subjective    Ms. Sherry King is a 63 year old female seen at [facility] heart failure clinic today for continued follow-up.  Her daughter accompanies her today.  She follows up for biventricular heart failure, nonischemic cardiomyopathy, myocarditis.  She was hospitalized December 23 to December 29 due to chest tightness, shortness of breath and tachycardia.  Heart rate was in the 150s.  She had a VT arrest and CODE BLUE was called and CPR was initiated.  She also had torsades.  She received CPR for 15 minutes.  He was given IV magnesium and shocked with 200 J.  Within a minute patient was alert.  He had a cardiac MRI which showed biventricular heart failure with LVEF 21% and RVEF  22% and left bundle branch block.  Also showed myocarditis.  She had a coronary angiogram which showed no coronary artery disease.  She had a CRT-D placed on December 28.  She has a past medical history significant for hypertension, CRT-D, LBBB.    Today, she has fatigue and occasional lightheadedness.  She denies shortness of breath, dyspnea on exertion, orthopnea, PND, palpitations, chest pain, abdominal fullness/bloating, and lower extremity edema.      She is monitoring home weights which are stable around 131 pounds.  She is following a low sodium diet.  She participates in regular physical activity including cardiac rehab a few days a week.         Cardiac MRI: 12/26/2023-reviewed  SUMMARY   ==========================================================================================================     1.  Left ventricle is moderately enlarged with severe reduction in global systolic function. There is  severe desynchrony related to conduction abnormality (LBBB).  Normal wall thickness .  The quantified left  ventricular ejection fraction is 21%.  There is areas of patchy epicardial and mid myocardial  enhancement/fibrosis on LGE imaging most consistent with myocarditis.  Pre-contrast T1 is abnormal and  elevated at 1160 ms.  ECV: 33.6% (elevated, abnormal).   2.  Mild enlargement of right ventricle with severe reduction in systolic function.  RVEF:22%  3.  No significant valvular abnormalities.  4.  Normal right and left atrial size.   5.  There is a small pericardial effusion.       Coronary angiogram: 12/26/2023-reviewed  Pre Procedure Diagnosis    Cardiomyopathy  Ventricular tachycardia    Post Procedure Diagnosis    Nonischemic cardiomyopathy      Conclusion    1.  Large-caliber normal-appearing epicardial coronary arteries in a right dominant system.         Recommendations    General Recommendations:  - Patient given specific instructions regarding care of arteriotomy site, activity restrictions, signs  "and symptoms of cardiac or vascular complications and to seek immediate medical evaluation should they occur.   - Arterial sheath removed from radial artery with TR Band placement.        Physical Examination Review of Systems   /62 (BP Location: Right arm, Patient Position: Sitting, Cuff Size: Adult Small)   Pulse 67   Resp 16   Ht 1.778 m (5' 10\")   Wt 61.1 kg (134 lb 12.8 oz)   BMI 19.34 kg/m    Body mass index is 19.34 kg/m .  Wt Readings from Last 3 Encounters:   02/08/24 61.1 kg (134 lb 12.8 oz)   12/29/23 61.7 kg (136 lb)   12/23/23 62.1 kg (137 lb)       General Appearance:   no acute distress   ENT/Mouth: No abnormalities   EYES:  no scleral icterus, normal conjunctivae   Neck: no thyromegaly   Chest/Lungs:   lungs are clear to auscultation, no rales or wheezing, equal chest wall expansion    Cardiovascular:   Regular. Normal first and second heart sounds with no murmurs, rubs, or gallops, no edema bilaterally    Abdomen:  bowel sounds are present   Extremities: no cyanosis or clubbing   Skin: warm   Neurologic: no tremors     Psychiatric: alert and oriented x3    Enc Vitals  BP: 100/62  Pulse: 67  Resp: 16  Weight: 61.1 kg (134 lb 12.8 oz)  Height: 177.8 cm (5' 10\")                                         Medical History  Surgical History Family History Social History   Past Medical History:   Diagnosis Date    Biventricular heart failure (H) 02/08/2024    Cardiac arrest (H) 02/08/2024    Cardiac resynchronization therapy defibrillator (CRT-D) in place 12/29/2023    Congestive heart failure (H)     Hypertension     Nonischemic cardiomyopathy (H) 02/08/2024    Primary hypertension 02/08/2024    Thyroid disease     Past Surgical History:   Procedure Laterality Date    CORONARY ANGIOGRAPHY ADULT ORDER      CV CORONARY ANGIOGRAM N/A 12/26/2023    Procedure: Coronary Angiogram;  Surgeon: Simon Kwon MD;  Location: [facility] CATH LAB CV    EP ICD & LEAD IMPLANT-HIS LEAD BIVENTRICULAR N/A " 12/28/2023    Procedure: Implantable Cardioverter Defibrillator Device & Lead Implant - HIS Lead Biventricular;  Surgeon: Lior Moreno MD;  Location: [facility] CV    IMPLANT AUTOMATIC IMPLANTABLE CARDIOVERTER DEFIBRILLATOR      PICC TRIPLE LUMEN PLACEMENT  12/25/2023    No family history on file. Social History     Socioeconomic History    Marital status:      Spouse name: Not on file    Number of children: Not on file    Years of education: Not on file    Highest education level: Not on file   Occupational History    Not on file   Tobacco Use    Smoking status: Never     Passive exposure: Never    Smokeless tobacco: Never   Substance and Sexual Activity    Alcohol use: Not on file    Drug use: Not on file    Sexual activity: Not on file   Other Topics Concern    Not on file   Social History Narrative    Not on file     Social Determinants of Health     Financial Resource Strain: Not on file   Food Insecurity: Not on file   Transportation Needs: Not on file   Physical Activity: Not on file   Stress: Not on file   Social Connections: Not on file   Interpersonal Safety: Not on file   Housing Stability: Not on file          Medications  Allergies   Current Outpatient Medications   Medication Sig Dispense Refill    BIOTIN PO Take 1 tablet by mouth daily as needed      Cholecalciferol (VITAMIN D-3 PO) Take 1 tablet by mouth daily as needed      DENTA 5000 PLUS 1.1 % CREA daily      losartan (COZAAR) 25 MG tablet Take 1 tablet (25 mg) by mouth daily 90 tablet 3    MAGNESIUM GLYCINATE PO Take 2 tablets by mouth daily      metoprolol succinate ER (TOPROL XL) 50 MG 24 hr tablet Take 1 tablet (50 mg) by mouth 2 times daily 180 tablet 3    MILK THISTLE PO Take 1 tablet by mouth daily as needed      Turmeric (QC TUMERIC COMPLEX PO) Take 1 tablet by mouth daily as needed      vitamin B complex with vitamin C (VITAMIN  B COMPLEX) tablet as directed Orally      loratadine (CLARITIN) 10 MG tablet 1 tablet Orally  Once a day (Patient not taking: Reported on 2/8/2024)      Allergies   Allergen Reactions    Lanolin Rash         Lab Results    Chemistry/lipid CBC Cardiac Enzymes/BNP/TSH/INR   Lab Results   Component Value Date    CHOL 216 (H) 12/24/2023     12/24/2023    TRIG 76 12/24/2023    BUN 10.9 12/29/2023     12/29/2023    CO2 27 12/29/2023    Lab Results   Component Value Date    WBC 5.4 12/29/2023    HGB 12.0 12/29/2023    HCT 36.3 12/29/2023    MCV 99 12/29/2023     (L) 12/29/2023    Lab Results   Component Value Date    TSH 7.94 (H) 12/23/2023             This note has been dictated using voice recognition software. Any grammatical, typographical, or context distortions are unintentional and inherent to the software    40 minutes spent on the date of encounter doing chart review, review of outside records, review of test results, interpretation with above tests, patient visit, documentation, and discussion with family.

## 2024-02-08 NOTE — PATIENT INSTRUCTIONS
Sherry King,    It was a pleasure to see you today at [facility].     My recommendations after this visit include:  - Please follow up with Dr Huggins in 3 months   - Please follow up with Antonina Hernandez in 1 month  - I have checked labs  - Schedule echocardiogram late March  - Continue current medications    Antonina Hernandez, CNP

## 2024-02-13 ENCOUNTER — TELEPHONE (OUTPATIENT)
Dept: CARDIOLOGY | Facility: CLINIC | Age: 64
End: 2024-02-13
Payer: COMMERCIAL

## 2024-02-13 NOTE — TELEPHONE ENCOUNTER
Called FMLA twice with lengthy hold times. Ubalo messaged pt for instructions how to follow up with FMLA and possibly receiving a direct number for representative for me to call.    Sarkis Banks RN [facility]

## 2024-02-13 NOTE — TELEPHONE ENCOUNTER
[facility] Call Center    Phone Message    May a detailed message be left on voicemail: yes     Reason for Call: Other: Shahzad is requesting a call back to verify changes that were made on the [facility] paperwork that he received.   Use ID # [id number] to identify when calling     Action Taken: Other: cardio    Travel Screening: Not Applicable    Thank you!  [facility]

## 2024-02-14 ENCOUNTER — HOSPITAL ENCOUNTER (OUTPATIENT)
Dept: CARDIAC REHAB | Facility: HOSPITAL | Age: 64
Discharge: HOME | End: 2024-02-14
Attending: INTERNAL MEDICINE
Payer: COMMERCIAL

## 2024-02-14 PROCEDURE — 93798 PHYS/QHP OP CAR RHAB W/ECG: CPT

## 2024-02-16 ENCOUNTER — HOSPITAL ENCOUNTER (OUTPATIENT)
Dept: CARDIAC REHAB | Facility: HOSPITAL | Age: 64
Discharge: HOME | End: 2024-02-16
Attending: INTERNAL MEDICINE
Payer: COMMERCIAL

## 2024-02-16 PROCEDURE — 93798 PHYS/QHP OP CAR RHAB W/ECG: CPT

## 2024-03-01 ENCOUNTER — OFFICE VISIT (OUTPATIENT)
Dept: CARDIOLOGY | Facility: CLINIC | Age: 64
End: 2024-03-01
Attending: NURSE PRACTITIONER
Payer: COMMERCIAL

## 2024-03-01 VITALS
HEART RATE: 70 BPM | HEIGHT: 70 IN | RESPIRATION RATE: 16 BRPM | BODY MASS INDEX: 19.04 KG/M2 | WEIGHT: 133 LBS | DIASTOLIC BLOOD PRESSURE: 64 MMHG | SYSTOLIC BLOOD PRESSURE: 94 MMHG

## 2024-03-01 DIAGNOSIS — R93.1 LVEF <40%: ICD-10-CM

## 2024-03-01 DIAGNOSIS — I42.8 NONISCHEMIC CARDIOMYOPATHY (H): ICD-10-CM

## 2024-03-01 DIAGNOSIS — I51.4 MYOCARDITIS, UNSPECIFIED CHRONICITY, UNSPECIFIED MYOCARDITIS TYPE (H): ICD-10-CM

## 2024-03-01 DIAGNOSIS — I10 PRIMARY HYPERTENSION: ICD-10-CM

## 2024-03-01 DIAGNOSIS — I50.82 BIVENTRICULAR HEART FAILURE (H): Primary | ICD-10-CM

## 2024-03-01 PROCEDURE — 99214 OFFICE O/P EST MOD 30 MIN: CPT | Performed by: NURSE PRACTITIONER

## 2024-03-01 RX ORDER — LIDOCAINE 50 MG/G
OINTMENT TOPICAL
COMMUNITY
Start: 2024-02-21

## 2024-03-01 RX ORDER — METOPROLOL SUCCINATE 50 MG/1
50 TABLET, EXTENDED RELEASE ORAL DAILY
Status: SHIPPED
Start: 2024-03-01 | End: 2024-04-22

## 2024-03-01 NOTE — LETTER
3/1/2024    Sadie Kimball MD  [address] [place] [address]    RE: Sherry King       Dear Colleague,     I had the pleasure of seeing Sherry King in the [facility].        Assessment/Recommendations   Assessment:    1. Nonischemic cardiomyopathy, heart failure with reduced ejection fraction, biventricular heart failure, LVEF 21%, RVEF 22%, NYHA class II:  2.  Hypotension: She states she has been having dizziness.  She will check her blood pressure and has been 77/50 at home.  This is occurring a few times a week.    Plan:  1.   Heart failure medications:  - Beta blocker therapy with metoprolol succinate reduced to 50 mg daily due to symptomatic hypotension  - ARB therapy with losartan 25 mg daily  2.  Echocardiogram scheduled March 25  3.  Continue monitoring daily weights and following a low-salt diet  4.  Encouraged regular activity    Sherry King will follow up with Dr. Huggins in 3 months and in the heart failure clinic depending on echocardiogram results.     History of Present Illness/Subjective    Ms. Sherry King is a 63 year old female seen at [facility] heart failure clinic today for continued follow-up.  She follows up for biventricular heart failure, nonischemic cardiomyopathy, myocarditis.  She was hospitalized December 23 to December 29 due to chest tightness, shortness of breath and tachycardia.  Heart rate was in the 150s.  She had a VT arrest and CODE BLUE was called and CPR was initiated.  She also had torsades.  She received CPR for 15 minutes.  He was given IV magnesium and shocked with 200 J.  Within a minute patient was alert.  He had a cardiac MRI which showed biventricular heart failure with LVEF 21% and RVEF 22% and left bundle branch block.  Also showed myocarditis.  She had a coronary angiogram which showed no coronary artery disease.  She had a CRT-D placed on December 28.  She has a past medical history significant for hypertension,  "CRT-D, LBBB.     Today, she has fatigue and occasional lightheadedness/dizziness.  She denies shortness of breath, dyspnea on exertion, orthopnea, PND, palpitations, chest pain, abdominal fullness/bloating, and lower extremity edema.      She is monitoring home weights which are stable.  She is following a low sodium diet.  She participates in regular physical activity     Physical Examination Review of Systems   BP 94/64 (BP Location: Left arm, Patient Position: Sitting, Cuff Size: Adult Regular)   Pulse 70   Resp 16   Ht 1.778 m (5' 10\")   Wt 60.3 kg (133 lb)   BMI 19.08 kg/m    Body mass index is 19.08 kg/m .  Wt Readings from Last 3 Encounters:   03/01/24 60.3 kg (133 lb)   02/08/24 61.1 kg (134 lb 12.8 oz)   12/29/23 61.7 kg (136 lb)       General Appearance:   no acute distress   ENT/Mouth: No abnormalities   EYES:  no scleral icterus, normal conjunctivae   Neck: no thyromegaly   Chest/Lungs:   lungs are clear to auscultation, no rales or wheezing, equal chest wall expansion    Cardiovascular:   Regular. Normal first and second heart sounds with no murmurs, rubs, or gallops, no edema bilaterally    Abdomen:  bowel sounds are present   Extremities: no cyanosis or clubbing   Skin: warm   Neurologic: no tremors     Psychiatric: alert and oriented x3    Enc Vitals  BP: 94/64  Pulse: 70  Resp: 16  Weight: 60.3 kg (133 lb) (With boots.)  Height: 177.8 cm (5' 10\")                                         Medical History  Surgical History Family History Social History   Past Medical History:   Diagnosis Date    Biventricular heart failure (H) 02/08/2024    Cardiac arrest (H) 02/08/2024    Cardiac resynchronization therapy defibrillator (CRT-D) in place 12/29/2023    Congestive heart failure (H)     Hypertension     Nonischemic cardiomyopathy (H) 02/08/2024    Primary hypertension 02/08/2024    Thyroid disease     Past Surgical History:   Procedure Laterality Date    CORONARY ANGIOGRAPHY ADULT ORDER      CV " CORONARY ANGIOGRAM N/A 12/26/2023    Procedure: Coronary Angiogram;  Surgeon: Simon Kwon MD;  Location: [facility] LAB CV    EP ICD & LEAD IMPLANT-HIS LEAD BIVENTRICULAR N/A 12/28/2023    Procedure: Implantable Cardioverter Defibrillator Device & Lead Implant - HIS Lead Biventricular;  Surgeon: Lior Moreno MD;  Location: [facility] LAB CV    IMPLANT AUTOMATIC IMPLANTABLE CARDIOVERTER DEFIBRILLATOR      PICC TRIPLE LUMEN PLACEMENT  12/25/2023    No family history on file. Social History     Socioeconomic History    Marital status:      Spouse name: Not on file    Number of children: Not on file    Years of education: Not on file    Highest education level: Not on file   Occupational History    Not on file   Tobacco Use    Smoking status: Never     Passive exposure: Never    Smokeless tobacco: Never   Substance and Sexual Activity    Alcohol use: Not on file    Drug use: Not on file    Sexual activity: Not on file   Other Topics Concern    Not on file   Social History Narrative    Not on file     Social Determinants of Health     Financial Resource Strain: Not on file   Food Insecurity: Not on file   Transportation Needs: Not on file   Physical Activity: Not on file   Stress: Not on file   Social Connections: Not on file   Interpersonal Safety: Not on file   Housing Stability: Not on file          Medications  Allergies   Current Outpatient Medications   Medication Sig Dispense Refill    BIOTIN PO Take 1 tablet by mouth daily      Cholecalciferol (VITAMIN D-3 PO) Take 1 tablet by mouth daily      DENTA 5000 PLUS 1.1 % CREA Take 1 g by mouth daily      lidocaine (XYLOCAINE) 5 % external ointment Apply topically nightly as needed for moderate pain (Itching)      loratadine (CLARITIN) 10 MG tablet       losartan (COZAAR) 25 MG tablet Take 1 tablet (25 mg) by mouth daily 90 tablet 3    MAGNESIUM GLYCINATE PO Take 2 tablets by mouth daily as needed      metoprolol succinate ER (TOPROL XL) 50 MG 24  hr tablet Take 1 tablet (50 mg) by mouth daily      MILK THISTLE PO Take 1 tablet by mouth daily      Turmeric (QC TUMERIC COMPLEX PO) Take 1 tablet by mouth daily      vitamin B complex with vitamin C (VITAMIN  B COMPLEX) tablet as directed Orally      Allergies   Allergen Reactions    Lanolin Rash         Lab Results    Chemistry/lipid CBC Cardiac Enzymes/BNP/TSH/INR   Lab Results   Component Value Date    CHOL 216 (H) 12/24/2023     12/24/2023    TRIG 76 12/24/2023    BUN 11.5 02/08/2024     02/08/2024    CO2 28 02/08/2024    Lab Results   Component Value Date    WBC 5.4 12/29/2023    HGB 12.0 12/29/2023    HCT 36.3 12/29/2023    MCV 99 12/29/2023     (L) 12/29/2023    Lab Results   Component Value Date    TSH 4.42 (H) 02/08/2024             This note has been dictated using voice recognition software. Any grammatical, typographical, or context distortions are unintentional and inherent to the software    30 minutes spent on the date of encounter doing chart review, review of outside records, review of test results, interpretation with above tests, patient visit, and documentation.                  Thank you for allowing me to participate in the care of your patient.      Sincerely,     SHELLEY Montesinos CNP     [facility]  cc:   SHELLEY Montesinos CNP  [address], [address]  [place] [address]

## 2024-03-01 NOTE — PROGRESS NOTES
Assessment/Recommendations   Assessment:    1. Nonischemic cardiomyopathy, heart failure with reduced ejection fraction, biventricular heart failure, LVEF 21%, RVEF 22%, NYHA class II:  2.  Hypotension: She states she has been having dizziness.  She will check her blood pressure and has been 77/50 at home.  This is occurring a few times a week.    Plan:  1.   Heart failure medications:  - Beta blocker therapy with metoprolol succinate reduced to 50 mg daily due to symptomatic hypotension  - ARB therapy with losartan 25 mg daily  2.  Echocardiogram scheduled March 25  3.  Continue monitoring daily weights and following a low-salt diet  4.  Encouraged regular activity    Sherry King will follow up with Dr. Huggins in 3 months and in the heart failure clinic depending on echocardiogram results.     History of Present Illness/Subjective    Ms. Sherry King is a 63 year old female seen at [facility] heart failure clinic today for continued follow-up.  She follows up for biventricular heart failure, nonischemic cardiomyopathy, myocarditis.  She was hospitalized December 23 to December 29 due to chest tightness, shortness of breath and tachycardia.  Heart rate was in the 150s.  She had a VT arrest and CODE BLUE was called and CPR was initiated.  She also had torsades.  She received CPR for 15 minutes.  He was given IV magnesium and shocked with 200 J.  Within a minute patient was alert.  He had a cardiac MRI which showed biventricular heart failure with LVEF 21% and RVEF 22% and left bundle branch block.  Also showed myocarditis.  She had a coronary angiogram which showed no coronary artery disease.  She had a CRT-D placed on December 28.  She has a past medical history significant for hypertension, CRT-D, LBBB.     Today, she has fatigue and occasional lightheadedness/dizziness.  She denies shortness of breath, dyspnea on exertion, orthopnea, PND, palpitations, chest pain, abdominal fullness/bloating,  "and lower extremity edema.      She is monitoring home weights which are stable.  She is following a low sodium diet.  She participates in regular physical activity     Physical Examination Review of Systems   BP 94/64 (BP Location: Left arm, Patient Position: Sitting, Cuff Size: Adult Regular)   Pulse 70   Resp 16   Ht 1.778 m (5' 10\")   Wt 60.3 kg (133 lb)   BMI 19.08 kg/m    Body mass index is 19.08 kg/m .  Wt Readings from Last 3 Encounters:   03/01/24 60.3 kg (133 lb)   02/08/24 61.1 kg (134 lb 12.8 oz)   12/29/23 61.7 kg (136 lb)       General Appearance:   no acute distress   ENT/Mouth: No abnormalities   EYES:  no scleral icterus, normal conjunctivae   Neck: no thyromegaly   Chest/Lungs:   lungs are clear to auscultation, no rales or wheezing, equal chest wall expansion    Cardiovascular:   Regular. Normal first and second heart sounds with no murmurs, rubs, or gallops, no edema bilaterally    Abdomen:  bowel sounds are present   Extremities: no cyanosis or clubbing   Skin: warm   Neurologic: no tremors     Psychiatric: alert and oriented x3    Enc Vitals  BP: 94/64  Pulse: 70  Resp: 16  Weight: 60.3 kg (133 lb) (With boots.)  Height: 177.8 cm (5' 10\")                                         Medical History  Surgical History Family History Social History   Past Medical History:   Diagnosis Date    Biventricular heart failure (H) 02/08/2024    Cardiac arrest (H) 02/08/2024    Cardiac resynchronization therapy defibrillator (CRT-D) in place 12/29/2023    Congestive heart failure (H)     Hypertension     Nonischemic cardiomyopathy (H) 02/08/2024    Primary hypertension 02/08/2024    Thyroid disease     Past Surgical History:   Procedure Laterality Date    CORONARY ANGIOGRAPHY ADULT ORDER      CV CORONARY ANGIOGRAM N/A 12/26/2023    Procedure: Coronary Angiogram;  Surgeon: Simon Kwon MD;  Location: [facility] CATH LAB CV    EP ICD & LEAD IMPLANT-HIS LEAD BIVENTRICULAR N/A 12/28/2023    Procedure: " Implantable Cardioverter Defibrillator Device & Lead Implant - HIS Lead Biventricular;  Surgeon: Lior Moreno MD;  Location: [facility] CV    IMPLANT AUTOMATIC IMPLANTABLE CARDIOVERTER DEFIBRILLATOR      PICC TRIPLE LUMEN PLACEMENT  12/25/2023    No family history on file. Social History     Socioeconomic History    Marital status:      Spouse name: Not on file    Number of children: Not on file    Years of education: Not on file    Highest education level: Not on file   Occupational History    Not on file   Tobacco Use    Smoking status: Never     Passive exposure: Never    Smokeless tobacco: Never   Substance and Sexual Activity    Alcohol use: Not on file    Drug use: Not on file    Sexual activity: Not on file   Other Topics Concern    Not on file   Social History Narrative    Not on file     Social Determinants of Health     Financial Resource Strain: Not on file   Food Insecurity: Not on file   Transportation Needs: Not on file   Physical Activity: Not on file   Stress: Not on file   Social Connections: Not on file   Interpersonal Safety: Not on file   Housing Stability: Not on file          Medications  Allergies   Current Outpatient Medications   Medication Sig Dispense Refill    BIOTIN PO Take 1 tablet by mouth daily      Cholecalciferol (VITAMIN D-3 PO) Take 1 tablet by mouth daily      DENTA 5000 PLUS 1.1 % CREA Take 1 g by mouth daily      lidocaine (XYLOCAINE) 5 % external ointment Apply topically nightly as needed for moderate pain (Itching)      loratadine (CLARITIN) 10 MG tablet       losartan (COZAAR) 25 MG tablet Take 1 tablet (25 mg) by mouth daily 90 tablet 3    MAGNESIUM GLYCINATE PO Take 2 tablets by mouth daily as needed      metoprolol succinate ER (TOPROL XL) 50 MG 24 hr tablet Take 1 tablet (50 mg) by mouth daily      MILK THISTLE PO Take 1 tablet by mouth daily      Turmeric (QC TUMERIC COMPLEX PO) Take 1 tablet by mouth daily      vitamin B complex with vitamin C (VITAMIN   B COMPLEX) tablet as directed Orally      Allergies   Allergen Reactions    Lanolin Rash         Lab Results    Chemistry/lipid CBC Cardiac Enzymes/BNP/TSH/INR   Lab Results   Component Value Date    CHOL 216 (H) 12/24/2023     12/24/2023    TRIG 76 12/24/2023    BUN 11.5 02/08/2024     02/08/2024    CO2 28 02/08/2024    Lab Results   Component Value Date    WBC 5.4 12/29/2023    HGB 12.0 12/29/2023    HCT 36.3 12/29/2023    MCV 99 12/29/2023     (L) 12/29/2023    Lab Results   Component Value Date    TSH 4.42 (H) 02/08/2024             This note has been dictated using voice recognition software. Any grammatical, typographical, or context distortions are unintentional and inherent to the software    30 minutes spent on the date of encounter doing chart review, review of outside records, review of test results, interpretation with above tests, patient visit, and documentation.

## 2024-03-01 NOTE — PATIENT INSTRUCTIONS
Sherry King,    It was a pleasure to see you today at [facility].     My recommendations after this visit include:  - Please follow up with Dr Huggins in 3 months   - Please follow up with Antonina Hernandez depending on echocardiogram results  - Reduce metoprolol to 50 mg daily   - Echocardiogram scheduled March 25    Antonina Hernandez, CNP

## 2024-03-03 ENCOUNTER — HEALTH MAINTENANCE LETTER (OUTPATIENT)
Age: 64
End: 2024-03-03

## 2024-03-04 ENCOUNTER — ANCILLARY PROCEDURE (OUTPATIENT)
Dept: CARDIOLOGY | Facility: CLINIC | Age: 64
End: 2024-03-04
Attending: INTERNAL MEDICINE
Payer: COMMERCIAL

## 2024-03-04 ENCOUNTER — MEDICAL CORRESPONDENCE (OUTPATIENT)
Dept: CARDIAC REHAB | Facility: HOSPITAL | Age: 64
End: 2024-03-04

## 2024-03-04 DIAGNOSIS — Z95.810 CARDIAC RESYNCHRONIZATION THERAPY DEFIBRILLATOR (CRT-D) IN PLACE: ICD-10-CM

## 2024-03-04 DIAGNOSIS — I25.5 ISCHEMIC CARDIOMYOPATHY: ICD-10-CM

## 2024-03-05 LAB
MDC_IDC_EPISODE_DTM: NORMAL
MDC_IDC_EPISODE_DURATION: 21 S
MDC_IDC_EPISODE_ID: 4
MDC_IDC_EPISODE_TYPE: NORMAL
MDC_IDC_LEAD_CONNECTION_STATUS: NORMAL
MDC_IDC_LEAD_IMPLANT_DT: NORMAL
MDC_IDC_LEAD_LOCATION: NORMAL
MDC_IDC_LEAD_LOCATION_DETAIL_1: NORMAL
MDC_IDC_LEAD_MFG: NORMAL
MDC_IDC_LEAD_MODEL: NORMAL
MDC_IDC_LEAD_POLARITY_TYPE: NORMAL
MDC_IDC_LEAD_SERIAL: NORMAL
MDC_IDC_LEAD_SPECIAL_FUNCTION: NORMAL
MDC_IDC_MSMT_BATTERY_DTM: NORMAL
MDC_IDC_MSMT_BATTERY_REMAINING_LONGEVITY: 141 MO
MDC_IDC_MSMT_BATTERY_RRT_TRIGGER: NORMAL
MDC_IDC_MSMT_BATTERY_VOLTAGE: 3.12 V
MDC_IDC_MSMT_CAP_CHARGE_DTM: NORMAL
MDC_IDC_MSMT_CAP_CHARGE_ENERGY: 18 J
MDC_IDC_MSMT_CAP_CHARGE_TIME: 3.6 S
MDC_IDC_MSMT_CAP_CHARGE_TYPE: NORMAL
MDC_IDC_MSMT_LEADCHNL_LV_IMPEDANCE_VALUE: 342 OHM
MDC_IDC_MSMT_LEADCHNL_LV_IMPEDANCE_VALUE: 437 OHM
MDC_IDC_MSMT_LEADCHNL_LV_IMPEDANCE_VALUE: 703 OHM
MDC_IDC_MSMT_LEADCHNL_LV_PACING_THRESHOLD_AMPLITUDE: 0.75 V
MDC_IDC_MSMT_LEADCHNL_LV_PACING_THRESHOLD_PULSEWIDTH: 0.4 MS
MDC_IDC_MSMT_LEADCHNL_RA_IMPEDANCE_VALUE: 513 OHM
MDC_IDC_MSMT_LEADCHNL_RA_SENSING_INTR_AMPL: 1.9 MV
MDC_IDC_MSMT_LEADCHNL_RV_IMPEDANCE_VALUE: 304 OHM
MDC_IDC_MSMT_LEADCHNL_RV_IMPEDANCE_VALUE: 437 OHM
MDC_IDC_MSMT_LEADCHNL_RV_SENSING_INTR_AMPL: 1 MV
MDC_IDC_PG_IMPLANT_DTM: NORMAL
MDC_IDC_PG_MFG: NORMAL
MDC_IDC_PG_MODEL: NORMAL
MDC_IDC_PG_SERIAL: NORMAL
MDC_IDC_PG_TYPE: NORMAL
MDC_IDC_SESS_CLINIC_NAME: NORMAL
MDC_IDC_SESS_DTM: NORMAL
MDC_IDC_SESS_TYPE: NORMAL
MDC_IDC_SET_BRADY_AT_MODE_SWITCH_RATE: 171 {BEATS}/MIN
MDC_IDC_SET_BRADY_LOWRATE: 60 {BEATS}/MIN
MDC_IDC_SET_BRADY_MAX_SENSOR_RATE: 120 {BEATS}/MIN
MDC_IDC_SET_BRADY_MAX_TRACKING_RATE: 130 {BEATS}/MIN
MDC_IDC_SET_BRADY_MODE: NORMAL
MDC_IDC_SET_BRADY_PAV_DELAY_HIGH: 100 MS
MDC_IDC_SET_BRADY_PAV_DELAY_LOW: 130 MS
MDC_IDC_SET_BRADY_SAV_DELAY_HIGH: 70 MS
MDC_IDC_SET_BRADY_SAV_DELAY_LOW: 100 MS
MDC_IDC_SET_CRT_PACED_CHAMBERS: NORMAL
MDC_IDC_SET_LEADCHNL_LV_PACING_AMPLITUDE: 1.25 V
MDC_IDC_SET_LEADCHNL_LV_PACING_ANODE_ELECTRODE_1: NORMAL
MDC_IDC_SET_LEADCHNL_LV_PACING_ANODE_LOCATION_1: NORMAL
MDC_IDC_SET_LEADCHNL_LV_PACING_CAPTURE_MODE: NORMAL
MDC_IDC_SET_LEADCHNL_LV_PACING_CATHODE_ELECTRODE_1: NORMAL
MDC_IDC_SET_LEADCHNL_LV_PACING_CATHODE_LOCATION_1: NORMAL
MDC_IDC_SET_LEADCHNL_LV_PACING_POLARITY: NORMAL
MDC_IDC_SET_LEADCHNL_LV_PACING_PULSEWIDTH: 0.4 MS
MDC_IDC_SET_LEADCHNL_RA_PACING_AMPLITUDE: 1.5 V
MDC_IDC_SET_LEADCHNL_RA_PACING_ANODE_ELECTRODE_1: NORMAL
MDC_IDC_SET_LEADCHNL_RA_PACING_ANODE_LOCATION_1: NORMAL
MDC_IDC_SET_LEADCHNL_RA_PACING_CATHODE_ELECTRODE_1: NORMAL
MDC_IDC_SET_LEADCHNL_RA_PACING_CATHODE_LOCATION_1: NORMAL
MDC_IDC_SET_LEADCHNL_RA_PACING_POLARITY: NORMAL
MDC_IDC_SET_LEADCHNL_RA_PACING_PULSEWIDTH: 0.4 MS
MDC_IDC_SET_LEADCHNL_RA_SENSING_ANODE_ELECTRODE_1: NORMAL
MDC_IDC_SET_LEADCHNL_RA_SENSING_ANODE_LOCATION_1: NORMAL
MDC_IDC_SET_LEADCHNL_RA_SENSING_CATHODE_ELECTRODE_1: NORMAL
MDC_IDC_SET_LEADCHNL_RA_SENSING_CATHODE_LOCATION_1: NORMAL
MDC_IDC_SET_LEADCHNL_RA_SENSING_POLARITY: NORMAL
MDC_IDC_SET_LEADCHNL_RA_SENSING_SENSITIVITY: 0.3 MV
MDC_IDC_SET_LEADCHNL_RV_SENSING_ANODE_ELECTRODE_1: NORMAL
MDC_IDC_SET_LEADCHNL_RV_SENSING_ANODE_LOCATION_1: NORMAL
MDC_IDC_SET_LEADCHNL_RV_SENSING_CATHODE_ELECTRODE_1: NORMAL
MDC_IDC_SET_LEADCHNL_RV_SENSING_CATHODE_LOCATION_1: NORMAL
MDC_IDC_SET_LEADCHNL_RV_SENSING_POLARITY: NORMAL
MDC_IDC_SET_LEADCHNL_RV_SENSING_SENSITIVITY: 0.3 MV
MDC_IDC_SET_ZONE_DETECTION_BEATS_DENOMINATOR: 16 {BEATS}
MDC_IDC_SET_ZONE_DETECTION_BEATS_DENOMINATOR: 32 {BEATS}
MDC_IDC_SET_ZONE_DETECTION_BEATS_DENOMINATOR: 40 {BEATS}
MDC_IDC_SET_ZONE_DETECTION_BEATS_NUMERATOR: 16 {BEATS}
MDC_IDC_SET_ZONE_DETECTION_BEATS_NUMERATOR: 30 {BEATS}
MDC_IDC_SET_ZONE_DETECTION_BEATS_NUMERATOR: 32 {BEATS}
MDC_IDC_SET_ZONE_DETECTION_INTERVAL: 320 MS
MDC_IDC_SET_ZONE_DETECTION_INTERVAL: 350 MS
MDC_IDC_SET_ZONE_DETECTION_INTERVAL: 360 MS
MDC_IDC_SET_ZONE_DETECTION_INTERVAL: 400 MS
MDC_IDC_SET_ZONE_STATUS: NORMAL
MDC_IDC_SET_ZONE_TYPE: NORMAL
MDC_IDC_SET_ZONE_VENDOR_TYPE: NORMAL
MDC_IDC_STAT_AT_BURDEN_PERCENT: 0 %
MDC_IDC_STAT_AT_DTM_END: NORMAL
MDC_IDC_STAT_AT_DTM_START: NORMAL
MDC_IDC_STAT_BRADY_DTM_END: NORMAL
MDC_IDC_STAT_BRADY_DTM_START: NORMAL
MDC_IDC_STAT_BRADY_RV_PERCENT_PACED: 0 %
MDC_IDC_STAT_CRT_DTM_END: NORMAL
MDC_IDC_STAT_CRT_DTM_START: NORMAL
MDC_IDC_STAT_CRT_LV_PERCENT_PACED: 93.53 %
MDC_IDC_STAT_CRT_PERCENT_PACED: 0 %
MDC_IDC_STAT_EPISODE_RECENT_COUNT: 0
MDC_IDC_STAT_EPISODE_RECENT_COUNT: 1
MDC_IDC_STAT_EPISODE_RECENT_COUNT_DTM_END: NORMAL
MDC_IDC_STAT_EPISODE_RECENT_COUNT_DTM_START: NORMAL
MDC_IDC_STAT_EPISODE_TOTAL_COUNT: 0
MDC_IDC_STAT_EPISODE_TOTAL_COUNT: 1
MDC_IDC_STAT_EPISODE_TOTAL_COUNT: 3
MDC_IDC_STAT_EPISODE_TOTAL_COUNT_DTM_END: NORMAL
MDC_IDC_STAT_EPISODE_TOTAL_COUNT_DTM_START: NORMAL
MDC_IDC_STAT_EPISODE_TYPE: NORMAL
MDC_IDC_STAT_TACHYTHERAPY_ATP_DELIVERED_RECENT: 0
MDC_IDC_STAT_TACHYTHERAPY_ATP_DELIVERED_TOTAL: 0
MDC_IDC_STAT_TACHYTHERAPY_RECENT_DTM_END: NORMAL
MDC_IDC_STAT_TACHYTHERAPY_RECENT_DTM_START: NORMAL
MDC_IDC_STAT_TACHYTHERAPY_SHOCKS_ABORTED_RECENT: 0
MDC_IDC_STAT_TACHYTHERAPY_SHOCKS_ABORTED_TOTAL: 0
MDC_IDC_STAT_TACHYTHERAPY_SHOCKS_DELIVERED_RECENT: 0
MDC_IDC_STAT_TACHYTHERAPY_SHOCKS_DELIVERED_TOTAL: 0
MDC_IDC_STAT_TACHYTHERAPY_TOTAL_DTM_END: NORMAL
MDC_IDC_STAT_TACHYTHERAPY_TOTAL_DTM_START: NORMAL

## 2024-03-25 ENCOUNTER — HOSPITAL ENCOUNTER (OUTPATIENT)
Dept: CARDIOLOGY | Facility: HOSPITAL | Age: 64
Discharge: HOME | End: 2024-03-25
Attending: NURSE PRACTITIONER | Admitting: NURSE PRACTITIONER
Payer: COMMERCIAL

## 2024-03-25 DIAGNOSIS — I50.82 BIVENTRICULAR HEART FAILURE (H): ICD-10-CM

## 2024-03-25 LAB — LVEF ECHO: NORMAL

## 2024-03-25 PROCEDURE — 93306 TTE W/DOPPLER COMPLETE: CPT | Mod: 26 | Performed by: INTERNAL MEDICINE

## 2024-03-25 PROCEDURE — 93306 TTE W/DOPPLER COMPLETE: CPT

## 2024-03-26 DIAGNOSIS — I42.8 NONISCHEMIC CARDIOMYOPATHY (H): Primary | ICD-10-CM

## 2024-04-22 ENCOUNTER — TELEPHONE (OUTPATIENT)
Dept: CARDIOLOGY | Facility: CLINIC | Age: 64
End: 2024-04-22
Payer: COMMERCIAL

## 2024-04-22 DIAGNOSIS — R93.1 LVEF <40%: ICD-10-CM

## 2024-04-22 RX ORDER — METOPROLOL SUCCINATE 50 MG/1
50 TABLET, EXTENDED RELEASE ORAL DAILY
Qty: 90 TABLET | Refills: 3 | Status: SHIPPED | OUTPATIENT
Start: 2024-04-22 | End: 2024-05-14

## 2024-04-22 NOTE — TELEPHONE ENCOUNTER
Health Call Center    Phone Message    May a detailed message be left on voicemail: yes     Reason for Call: Medication Refill Request    Has the patient contacted the pharmacy for the refill? Yes   Name of medication being requested: metoprolol succinate ER (TOPROL XL) 50 MG 24 hr tablet   Provider who prescribed the medication: Mary  Pharmacy:  call [phone]  [facility] [id number] [facility] - [place], [place] - [address]  Date medication is needed: OUT of medication. Pharmacy will not fill medication. Patient was told to have us call ASAP.     Patient will be missing one day if not called in today. Is that ok? Please reach out. Thank you      Action Taken: Other: cardiology     Travel Screening: Not Applicable    Thank you!  [facility]

## 2024-05-09 ENCOUNTER — ANCILLARY PROCEDURE (OUTPATIENT)
Dept: CARDIOLOGY | Facility: CLINIC | Age: 64
End: 2024-05-09
Attending: INTERNAL MEDICINE
Payer: COMMERCIAL

## 2024-05-09 DIAGNOSIS — I47.29 PAROXYSMAL VENTRICULAR TACHYCARDIA (H): ICD-10-CM

## 2024-05-09 DIAGNOSIS — Z95.810 ICD (IMPLANTABLE CARDIOVERTER-DEFIBRILLATOR) IN PLACE: ICD-10-CM

## 2024-05-09 DIAGNOSIS — I25.5 ISCHEMIC CARDIOMYOPATHY: ICD-10-CM

## 2024-05-10 ENCOUNTER — TELEPHONE (OUTPATIENT)
Dept: CARDIOLOGY | Facility: CLINIC | Age: 64
End: 2024-05-10

## 2024-05-10 LAB
MDC_IDC_EPISODE_DTM: NORMAL
MDC_IDC_EPISODE_DURATION: 1 S
MDC_IDC_EPISODE_DURATION: 12 S
MDC_IDC_EPISODE_DURATION: 131 S
MDC_IDC_EPISODE_DURATION: 14 S
MDC_IDC_EPISODE_DURATION: 15 S
MDC_IDC_EPISODE_DURATION: 2 S
MDC_IDC_EPISODE_DURATION: 2 S
MDC_IDC_EPISODE_DURATION: 20 S
MDC_IDC_EPISODE_DURATION: 24 S
MDC_IDC_EPISODE_DURATION: 24 S
MDC_IDC_EPISODE_DURATION: 26 S
MDC_IDC_EPISODE_DURATION: 266 S
MDC_IDC_EPISODE_DURATION: 30 S
MDC_IDC_EPISODE_DURATION: 35 S
MDC_IDC_EPISODE_DURATION: 35 S
MDC_IDC_EPISODE_DURATION: 41 S
MDC_IDC_EPISODE_DURATION: 44 S
MDC_IDC_EPISODE_DURATION: 44 S
MDC_IDC_EPISODE_DURATION: 52 S
MDC_IDC_EPISODE_DURATION: 56 S
MDC_IDC_EPISODE_DURATION: 76 S
MDC_IDC_EPISODE_DURATION: 79 S
MDC_IDC_EPISODE_DURATION: 80 S
MDC_IDC_EPISODE_DURATION: 9 S
MDC_IDC_EPISODE_ID: 10
MDC_IDC_EPISODE_ID: 11
MDC_IDC_EPISODE_ID: 12
MDC_IDC_EPISODE_ID: 13
MDC_IDC_EPISODE_ID: 14
MDC_IDC_EPISODE_ID: 15
MDC_IDC_EPISODE_ID: 16
MDC_IDC_EPISODE_ID: 17
MDC_IDC_EPISODE_ID: 18
MDC_IDC_EPISODE_ID: 19
MDC_IDC_EPISODE_ID: 20
MDC_IDC_EPISODE_ID: 21
MDC_IDC_EPISODE_ID: 22
MDC_IDC_EPISODE_ID: 23
MDC_IDC_EPISODE_ID: 24
MDC_IDC_EPISODE_ID: 25
MDC_IDC_EPISODE_ID: 26
MDC_IDC_EPISODE_ID: 27
MDC_IDC_EPISODE_ID: 28
MDC_IDC_EPISODE_ID: 5
MDC_IDC_EPISODE_ID: 6
MDC_IDC_EPISODE_ID: 7
MDC_IDC_EPISODE_ID: 8
MDC_IDC_EPISODE_ID: 9
MDC_IDC_EPISODE_TYPE: NORMAL
MDC_IDC_LEAD_CONNECTION_STATUS: NORMAL
MDC_IDC_LEAD_IMPLANT_DT: NORMAL
MDC_IDC_LEAD_LOCATION: NORMAL
MDC_IDC_LEAD_LOCATION_DETAIL_1: NORMAL
MDC_IDC_LEAD_MFG: NORMAL
MDC_IDC_LEAD_MODEL: NORMAL
MDC_IDC_LEAD_POLARITY_TYPE: NORMAL
MDC_IDC_LEAD_SERIAL: NORMAL
MDC_IDC_LEAD_SPECIAL_FUNCTION: NORMAL
MDC_IDC_MSMT_BATTERY_DTM: NORMAL
MDC_IDC_MSMT_BATTERY_REMAINING_LONGEVITY: 136 MO
MDC_IDC_MSMT_BATTERY_RRT_TRIGGER: NORMAL
MDC_IDC_MSMT_BATTERY_VOLTAGE: 3.07 V
MDC_IDC_MSMT_CAP_CHARGE_DTM: NORMAL
MDC_IDC_MSMT_CAP_CHARGE_ENERGY: 18 J
MDC_IDC_MSMT_CAP_CHARGE_TIME: 3.8 S
MDC_IDC_MSMT_CAP_CHARGE_TYPE: NORMAL
MDC_IDC_MSMT_LEADCHNL_LV_IMPEDANCE_VALUE: 342 OHM
MDC_IDC_MSMT_LEADCHNL_LV_IMPEDANCE_VALUE: 456 OHM
MDC_IDC_MSMT_LEADCHNL_LV_IMPEDANCE_VALUE: 703 OHM
MDC_IDC_MSMT_LEADCHNL_LV_PACING_THRESHOLD_AMPLITUDE: 0.88 V
MDC_IDC_MSMT_LEADCHNL_LV_PACING_THRESHOLD_PULSEWIDTH: 0.4 MS
MDC_IDC_MSMT_LEADCHNL_RA_IMPEDANCE_VALUE: 513 OHM
MDC_IDC_MSMT_LEADCHNL_RA_SENSING_INTR_AMPL: 1.8 MV
MDC_IDC_MSMT_LEADCHNL_RV_IMPEDANCE_VALUE: 304 OHM
MDC_IDC_MSMT_LEADCHNL_RV_IMPEDANCE_VALUE: 456 OHM
MDC_IDC_MSMT_LEADCHNL_RV_SENSING_INTR_AMPL: 19.3 MV
MDC_IDC_PG_IMPLANT_DTM: NORMAL
MDC_IDC_PG_MFG: NORMAL
MDC_IDC_PG_MODEL: NORMAL
MDC_IDC_PG_SERIAL: NORMAL
MDC_IDC_PG_TYPE: NORMAL
MDC_IDC_SESS_CLINIC_NAME: NORMAL
MDC_IDC_SESS_DTM: NORMAL
MDC_IDC_SESS_TYPE: NORMAL
MDC_IDC_SET_BRADY_AT_MODE_SWITCH_RATE: 171 {BEATS}/MIN
MDC_IDC_SET_BRADY_LOWRATE: 60 {BEATS}/MIN
MDC_IDC_SET_BRADY_MAX_SENSOR_RATE: 120 {BEATS}/MIN
MDC_IDC_SET_BRADY_MAX_TRACKING_RATE: 130 {BEATS}/MIN
MDC_IDC_SET_BRADY_MODE: NORMAL
MDC_IDC_SET_BRADY_PAV_DELAY_HIGH: 100 MS
MDC_IDC_SET_BRADY_PAV_DELAY_LOW: 130 MS
MDC_IDC_SET_BRADY_SAV_DELAY_HIGH: 70 MS
MDC_IDC_SET_BRADY_SAV_DELAY_LOW: 100 MS
MDC_IDC_SET_CRT_PACED_CHAMBERS: NORMAL
MDC_IDC_SET_LEADCHNL_LV_PACING_AMPLITUDE: 1.5 V
MDC_IDC_SET_LEADCHNL_LV_PACING_ANODE_ELECTRODE_1: NORMAL
MDC_IDC_SET_LEADCHNL_LV_PACING_ANODE_LOCATION_1: NORMAL
MDC_IDC_SET_LEADCHNL_LV_PACING_CAPTURE_MODE: NORMAL
MDC_IDC_SET_LEADCHNL_LV_PACING_CATHODE_ELECTRODE_1: NORMAL
MDC_IDC_SET_LEADCHNL_LV_PACING_CATHODE_LOCATION_1: NORMAL
MDC_IDC_SET_LEADCHNL_LV_PACING_POLARITY: NORMAL
MDC_IDC_SET_LEADCHNL_LV_PACING_PULSEWIDTH: 0.4 MS
MDC_IDC_SET_LEADCHNL_RA_PACING_AMPLITUDE: 1.5 V
MDC_IDC_SET_LEADCHNL_RA_PACING_ANODE_ELECTRODE_1: NORMAL
MDC_IDC_SET_LEADCHNL_RA_PACING_ANODE_LOCATION_1: NORMAL
MDC_IDC_SET_LEADCHNL_RA_PACING_CATHODE_ELECTRODE_1: NORMAL
MDC_IDC_SET_LEADCHNL_RA_PACING_CATHODE_LOCATION_1: NORMAL
MDC_IDC_SET_LEADCHNL_RA_PACING_POLARITY: NORMAL
MDC_IDC_SET_LEADCHNL_RA_PACING_PULSEWIDTH: 0.4 MS
MDC_IDC_SET_LEADCHNL_RA_SENSING_ANODE_ELECTRODE_1: NORMAL
MDC_IDC_SET_LEADCHNL_RA_SENSING_ANODE_LOCATION_1: NORMAL
MDC_IDC_SET_LEADCHNL_RA_SENSING_CATHODE_ELECTRODE_1: NORMAL
MDC_IDC_SET_LEADCHNL_RA_SENSING_CATHODE_LOCATION_1: NORMAL
MDC_IDC_SET_LEADCHNL_RA_SENSING_POLARITY: NORMAL
MDC_IDC_SET_LEADCHNL_RA_SENSING_SENSITIVITY: 0.3 MV
MDC_IDC_SET_LEADCHNL_RV_SENSING_ANODE_ELECTRODE_1: NORMAL
MDC_IDC_SET_LEADCHNL_RV_SENSING_ANODE_LOCATION_1: NORMAL
MDC_IDC_SET_LEADCHNL_RV_SENSING_CATHODE_ELECTRODE_1: NORMAL
MDC_IDC_SET_LEADCHNL_RV_SENSING_CATHODE_LOCATION_1: NORMAL
MDC_IDC_SET_LEADCHNL_RV_SENSING_POLARITY: NORMAL
MDC_IDC_SET_LEADCHNL_RV_SENSING_SENSITIVITY: 0.3 MV
MDC_IDC_SET_ZONE_DETECTION_BEATS_DENOMINATOR: 16 {BEATS}
MDC_IDC_SET_ZONE_DETECTION_BEATS_DENOMINATOR: 32 {BEATS}
MDC_IDC_SET_ZONE_DETECTION_BEATS_DENOMINATOR: 40 {BEATS}
MDC_IDC_SET_ZONE_DETECTION_BEATS_NUMERATOR: 16 {BEATS}
MDC_IDC_SET_ZONE_DETECTION_BEATS_NUMERATOR: 30 {BEATS}
MDC_IDC_SET_ZONE_DETECTION_BEATS_NUMERATOR: 32 {BEATS}
MDC_IDC_SET_ZONE_DETECTION_INTERVAL: 320 MS
MDC_IDC_SET_ZONE_DETECTION_INTERVAL: 350 MS
MDC_IDC_SET_ZONE_DETECTION_INTERVAL: 360 MS
MDC_IDC_SET_ZONE_DETECTION_INTERVAL: 400 MS
MDC_IDC_SET_ZONE_STATUS: NORMAL
MDC_IDC_SET_ZONE_TYPE: NORMAL
MDC_IDC_SET_ZONE_VENDOR_TYPE: NORMAL
MDC_IDC_STAT_AT_BURDEN_PERCENT: 0 %
MDC_IDC_STAT_AT_DTM_END: NORMAL
MDC_IDC_STAT_AT_DTM_START: NORMAL
MDC_IDC_STAT_BRADY_DTM_END: NORMAL
MDC_IDC_STAT_BRADY_DTM_START: NORMAL
MDC_IDC_STAT_BRADY_RV_PERCENT_PACED: 0 %
MDC_IDC_STAT_CRT_DTM_END: NORMAL
MDC_IDC_STAT_CRT_DTM_START: NORMAL
MDC_IDC_STAT_CRT_LV_PERCENT_PACED: 89.25 %
MDC_IDC_STAT_CRT_PERCENT_PACED: 0 %
MDC_IDC_STAT_EPISODE_RECENT_COUNT: 0
MDC_IDC_STAT_EPISODE_RECENT_COUNT: 2
MDC_IDC_STAT_EPISODE_RECENT_COUNT: 22
MDC_IDC_STAT_EPISODE_RECENT_COUNT_DTM_END: NORMAL
MDC_IDC_STAT_EPISODE_RECENT_COUNT_DTM_START: NORMAL
MDC_IDC_STAT_EPISODE_TOTAL_COUNT: 0
MDC_IDC_STAT_EPISODE_TOTAL_COUNT: 25
MDC_IDC_STAT_EPISODE_TOTAL_COUNT: 3
MDC_IDC_STAT_EPISODE_TOTAL_COUNT_DTM_END: NORMAL
MDC_IDC_STAT_EPISODE_TOTAL_COUNT_DTM_START: NORMAL
MDC_IDC_STAT_EPISODE_TYPE: NORMAL
MDC_IDC_STAT_TACHYTHERAPY_ATP_DELIVERED_RECENT: 0
MDC_IDC_STAT_TACHYTHERAPY_ATP_DELIVERED_TOTAL: 0
MDC_IDC_STAT_TACHYTHERAPY_RECENT_DTM_END: NORMAL
MDC_IDC_STAT_TACHYTHERAPY_RECENT_DTM_START: NORMAL
MDC_IDC_STAT_TACHYTHERAPY_SHOCKS_ABORTED_RECENT: 0
MDC_IDC_STAT_TACHYTHERAPY_SHOCKS_ABORTED_TOTAL: 0
MDC_IDC_STAT_TACHYTHERAPY_SHOCKS_DELIVERED_RECENT: 0
MDC_IDC_STAT_TACHYTHERAPY_SHOCKS_DELIVERED_TOTAL: 0
MDC_IDC_STAT_TACHYTHERAPY_TOTAL_DTM_END: NORMAL
MDC_IDC_STAT_TACHYTHERAPY_TOTAL_DTM_START: NORMAL

## 2024-05-10 PROCEDURE — 93295 DEV INTERROG REMOTE 1/2/MLT: CPT | Performed by: INTERNAL MEDICINE

## 2024-05-10 PROCEDURE — 93296 REM INTERROG EVL PM/IDS: CPT | Performed by: INTERNAL MEDICINE

## 2024-05-10 NOTE — TELEPHONE ENCOUNTER
----- Message from Erin HEATH Bob sent at 5/10/2024  9:44 AM CDT -----  Regarding: device RN review  Encounter Type: routine remote CRT-D transmission.   Device: Medtronic Cobalt.   Pacing %/Programmed: AP 0%, LV 89% at DDD 60/130 ppm. LV pacing only.   Lead(s): stable.   Battery longevity: 11yrs, 4mo estimated.   Presenting: AS- 90 bpm.   Atrial high rates: since 3/4/24; 22 fast A&V episodes, limited EGMs all appear to be ST/ bpm, unable to see onset/termination.   Anticoagulant: None.   Ventricular High rates: since 3/4/24; two NSVT detected.   Comments: Normal device function. Routed to device RN to review low LV pacing.   Plan: next remote check scheduled for 8/23/24. KAYLEY Rojas, Device Specialist      Transmission reviewed. Agree with findings. Decrease in conduction system pacing probably dude to PVCs as well as lost of atrial tracking due to PVARP- can consider reprogramming minimum PVARP from 250ms to 220ms, however, device may be tracking at higher rates.  Furthermore, it looks like Metoprolol Succinate was decreased from 50 mg BID to 50 mg daily in March due to hypotension, this seems to correlate with an increase in patient's heart rate and decrease in pacing per trends. Routed to Dr. Moreno.      Malachi Jaramillo, RN

## 2024-05-14 ENCOUNTER — OFFICE VISIT (OUTPATIENT)
Dept: CARDIOLOGY | Facility: CLINIC | Age: 64
End: 2024-05-14
Attending: NURSE PRACTITIONER
Payer: COMMERCIAL

## 2024-05-14 VITALS
SYSTOLIC BLOOD PRESSURE: 100 MMHG | DIASTOLIC BLOOD PRESSURE: 68 MMHG | RESPIRATION RATE: 20 BRPM | HEART RATE: 76 BPM | WEIGHT: 134.2 LBS | OXYGEN SATURATION: 96 % | BODY MASS INDEX: 19.26 KG/M2

## 2024-05-14 DIAGNOSIS — I50.82 BIVENTRICULAR HEART FAILURE (H): ICD-10-CM

## 2024-05-14 DIAGNOSIS — R93.1 LVEF <40%: ICD-10-CM

## 2024-05-14 DIAGNOSIS — I47.29 NSVT (NONSUSTAINED VENTRICULAR TACHYCARDIA) (H): ICD-10-CM

## 2024-05-14 DIAGNOSIS — I42.8 NON-ISCHEMIC CARDIOMYOPATHY (H): ICD-10-CM

## 2024-05-14 DIAGNOSIS — I50.22 CHRONIC SYSTOLIC HEART FAILURE (H): Primary | ICD-10-CM

## 2024-05-14 DIAGNOSIS — Z95.810 PRESENCE OF CARDIAC RESYNCHRONIZATION THERAPY DEFIBRILLATOR (CRT-D): ICD-10-CM

## 2024-05-14 PROCEDURE — G2211 COMPLEX E/M VISIT ADD ON: HCPCS | Performed by: INTERNAL MEDICINE

## 2024-05-14 PROCEDURE — 99215 OFFICE O/P EST HI 40 MIN: CPT | Performed by: INTERNAL MEDICINE

## 2024-05-14 RX ORDER — METOPROLOL SUCCINATE 25 MG/1
25 TABLET, EXTENDED RELEASE ORAL DAILY
Qty: 90 TABLET | Refills: 3 | Status: SHIPPED | OUTPATIENT
Start: 2024-05-14 | End: 2024-06-05

## 2024-05-14 NOTE — LETTER
5/14/2024    Sadie Kimball MD  [address] [facility] [address]    RE: Sherry HICKEY Christine       Dear Colleague,     I had the pleasure of seeing Sherry King in the [facility].    [facility] CARE   [address]  [place] [address]   [email or web address]   OFFICE: [phone]     CARDIOLOGY CLINIC NOTE     Thank you, Dr. Kimball, Sadie Arboleda, for asking the [facility] Heart Care team to see Ms. Sherry King to evaluate Follow Up         Assessment/Recommendations   Assessment:    Nonischemic cardiomyopathy with LVEF of 21% which has improved to 40-45% by late March  Chronic heart failure with reduced LVEF. NYHA class I-II  CRT-D in situ - resynchronization lead placed at His-purkinje site. Functioning normally, though with somewhat reduced CRT efficiency.  NSVT - 2 episodes in last device interrogation  Hypotension - BP better with decrease in metoprolol. She is complaining of generalized fatigue and lack of energy which improved with previous metoprolol dose reduction.    Plan:  Decrease metoprolol succinate to 25 mg daily. If there is an increase in NSVT events or a significant decline in LV pacing, then would consider increasing the metoprolol back to 50 mg.  Continue other medications without changes.  Follow up in HF clinic in the fall as planned and with me in a year.    Total time spent on the day of the encounter doing chart review, patient visit, documentation was 42 min    The longitudinal plan of care for heart failure, cardiomyopathy, ICD was addressed during this visit. Due to the added complexity in care, I will continue to support Sherry King in the subsequent management of this condition(s) and with the ongoing continuity of care of this condition(s).         History of Present Illness   Ms. Sherry King is a 63 year old female who presents for follow-up.     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 She was admitted in December, 2023 for chest tightness, shortness of breath and tachycardia. She suffered a VT arrest and received CPR for 15 minutes before regaining ROSC. She had a CRT-D placed on 12/28/23.     Sherry reports generalized fatigue. She is very tired at the end of the day and needs to go to sleep. These symptoms have been present since her cardiac event last December and improved somewhat since decreasing metoprolol at a previous visit. 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 She estimates getting about 15,000 steps daily. She wears knee high compression stockings while at work and reports some swelling in her thighs at the end of a work day.    Other than noted above, Ms. King denies any chest pain/pressure/tightness, shortness of breath at rest or with exertion, light headedness/dizziness, pre-syncope, syncope, lower extremity swelling, palpitations, paroxysmal nocturnal dyspnea (PND), or orthopnea.     Cardiac Problems and Cardiac Diagnostics     Most Recent Cardiac testing:  ECG dated 12/28/23 (personaly reviewed and interpreted): atrial sensed, ventricular paced rhythm.    ICD interrogation 5/10/24  Encounter Type: routine remote CRT-D transmission.   Device: Medtronic Cobalt.   Pacing %/Programmed: AP 0%, LV 89% at DDD 60/130 ppm. LV pacing only.   Lead(s): stable.   Battery longevity: 11yrs, 4mo estimated.   Presenting: AS- 90 bpm.   Atrial high rates: since 3/4/24; 22 fast A&V episodes, limited EGMs all appear to be ST/ bpm, unable to see onset/termination.   Anticoagulant: None.   Ventricular High rates: since 3/4/24; two NSVT detected.   Comments: Normal device function. Routed to device RN to review low LV pacing.     ECHO (report reviewed):   TTE 3/25/24  The visual ejection fraction is 40-45%.  There is septal akinesis.  There is severe inferior wall hypokinesis.  The right ventricle is normal in size and  function.  There is a ICD lead in the right ventricle.  No significant valve disease.  Compared to the prior study dated 12/24/2023, there are changes as noted.  Overall LV systolic function has improved although there is evidence of inferior and septal wall motion abnormalities.    Cardiac MRI 12/26/23  1.  Left ventricle is moderately enlarged with severe reduction in global systolic function. There is severe desynchrony related to conduction abnormality (LBBB).  Normal wall thickness .  The quantified left ventricular ejection fraction is 21%.  There is areas of patchy epicardial and mid myocardial enhancement/fibrosis on LGE imaging most consistent with myocarditis.  Pre-contrast T1 is abnormal and elevated at 1160 ms.  ECV: 33.6% (elevated, abnormal).   2.  Mild enlargement of right ventricle with severe reduction in systolic function.  RVEF:22%  3.  No significant valvular abnormalities.  4.  Normal right and left atrial size.   5.  There is a small pericardial effusion.    Cardiac cath: from 12/26/23 demonstrated   Left Main   The vessel was visualized by selective angiography and is moderate in size. There was 0% vessel disease.      Left Anterior Descending   The vessel was visualized by selective angiography and is moderate in size. There was 0% vessel disease.      Ramus Intermedius   The vessel was visualized by selective angiography and is moderate in size. There was 0% vessel disease.      Left Circumflex   The vessel was visualized by selective angiography and is moderate in size. There was 0% vessel disease.      Right Coronary Artery   The vessel was visualized by selective angiography and is moderate in size. There was 0% vessel disease.             Medications  Allergies   Current Outpatient Medications   Medication Sig Dispense Refill    BIOTIN PO Take 1 tablet by mouth daily      Cholecalciferol (VITAMIN D-3 PO) Take 1 tablet by mouth daily      DENTA 5000 PLUS 1.1 % CREA Take 1 g by mouth daily       losartan (COZAAR) 25 MG tablet Take 1 tablet (25 mg) by mouth daily 90 tablet 3    MAGNESIUM GLYCINATE PO Take 2 tablets by mouth daily as needed      metoprolol succinate ER (TOPROL XL) 50 MG 24 hr tablet Take 1 tablet (50 mg) by mouth daily 90 tablet 3    MILK THISTLE PO Take 1 tablet by mouth daily      Turmeric (QC TUMERIC COMPLEX PO) Take 1 tablet by mouth daily      UNABLE TO FIND Take 1 tablet by mouth daily MEDICATION NAME: Zyrtec      vitamin B complex with vitamin C (VITAMIN  B COMPLEX) tablet as directed Orally      lidocaine (XYLOCAINE) 5 % external ointment Apply topically nightly as needed for moderate pain (Itching) (Patient not taking: Reported on 5/14/2024)        Allergies   Allergen Reactions    Lanolin Rash        Physical Examination Review of Systems   Vitals: /68 (BP Location: Right arm, Patient Position: Sitting, Cuff Size: Adult Regular)   Pulse 76   Resp 20   Wt 60.9 kg (134 lb 3.2 oz)   SpO2 96%   BMI 19.26 kg/m    BMI= Body mass index is 19.26 kg/m .  Wt Readings from Last 3 Encounters:   05/14/24 60.9 kg (134 lb 3.2 oz)   03/01/24 60.3 kg (133 lb)   02/08/24 61.1 kg (134 lb 12.8 oz)       General: pleasant female. No acute distress.   Neck: No JVD  Lungs: clear to auscultation  COR: normal rate, regular rhythm, No murmurs, rubs, or gallops  Extrem: No edema        Please refer above for cardiac ROS details.       Past History       Social History:   Social History     Socioeconomic History    Marital status:      Spouse name: Not on file    Number of children: Not on file    Years of education: Not on file    Highest education level: Not on file   Occupational History    Not on file   Tobacco Use    Smoking status: Never     Passive exposure: Never    Smokeless tobacco: Never   Substance and Sexual Activity    Alcohol use: Not on file    Drug use: Not on file    Sexual activity: Not on file   Other Topics Concern    Not on file   Social History Narrative    Not on  file     Social Determinants of Health     Financial Resource Strain: Not on file   Food Insecurity: Not on file   Transportation Needs: Not on file   Physical Activity: Not on file   Stress: Not on file   Social Connections: Not on file   Interpersonal Safety: Not on file   Housing Stability: Not on file            Lab Results    Chemistry/lipid CBC Cardiac Enzymes/BNP/TSH/INR   Lab Results   Component Value Date    CHOL 216 (H) 12/24/2023     12/24/2023    TRIG 76 12/24/2023    BUN 11.5 02/08/2024     02/08/2024    CO2 28 02/08/2024    Lab Results   Component Value Date    WBC 5.4 12/29/2023    HGB 12.0 12/29/2023    HCT 36.3 12/29/2023    MCV 99 12/29/2023     (L) 12/29/2023    Lab Results   Component Value Date    TSH 4.42 (H) 02/08/2024                Thank you for allowing me to participate in the care of your patient.      Sincerely,     Tao Huggins MD     [facility]  cc:   SHELLEY Montesinos CNP  [address] [facility] [address], [address]  [place] [address]

## 2024-05-14 NOTE — PROGRESS NOTES
[facility] HEART CARE   [address]  [place] [address]   [email or web address]   OFFICE: [phone]     CARDIOLOGY CLINIC NOTE     Thank you, Sadie Willingham, for asking the [facility] Care team to see Ms. Sherry King to evaluate Follow Up         Assessment/Recommendations   Assessment:    Nonischemic cardiomyopathy with LVEF of 21% which has improved to 40-45% by late March  Chronic heart failure with reduced LVEF. NYHA class I-II  CRT-D in situ - resynchronization lead placed at His-purkinje site. Functioning normally, though with somewhat reduced CRT efficiency.  NSVT - 2 episodes in last device interrogation  Hypotension - BP better with decrease in metoprolol. She is complaining of generalized fatigue and lack of energy which improved with previous metoprolol dose reduction.    Plan:  Decrease metoprolol succinate to 25 mg daily. If there is an increase in NSVT events or a significant decline in LV pacing, then would consider increasing the metoprolol back to 50 mg.  Continue other medications without changes.  Follow up in HF clinic in the fall as planned and with me in a year.    Total time spent on the day of the encounter doing chart review, patient visit, documentation was 42 min    The longitudinal plan of care for heart failure, cardiomyopathy, ICD was addressed during this visit. Due to the added complexity in care, I will continue to support Sherry King in the subsequent management of this condition(s) and with the ongoing continuity of care of this condition(s).         History of Present Illness   Ms. Sherry King is a 63 year old female who presents for follow-up.     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 She was admitted in December, 2023 for chest tightness, shortness of breath and tachycardia. She suffered a VT arrest and received CPR for 15 minutes before regaining ROSC. She had a  CRT-D placed on 12/28/23.     Sherry reports generalized fatigue. She is very tired at the end of the day and needs to go to sleep. These symptoms have been present since her cardiac event last December and improved somewhat since decreasing metoprolol at a previous visit. 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 She estimates getting about 15,000 steps daily. She wears knee high compression stockings while at work and reports some swelling in her thighs at the end of a work day.    Other than noted above, Ms. King denies any chest pain/pressure/tightness, shortness of breath at rest or with exertion, light headedness/dizziness, pre-syncope, syncope, lower extremity swelling, palpitations, paroxysmal nocturnal dyspnea (PND), or orthopnea.     Cardiac Problems and Cardiac Diagnostics     Most Recent Cardiac testing:  ECG dated 12/28/23 (personaly reviewed and interpreted): atrial sensed, ventricular paced rhythm.    ICD interrogation 5/10/24  Encounter Type: routine remote CRT-D transmission.   Device: Medtronic Cobalt.   Pacing %/Programmed: AP 0%, LV 89% at DDD 60/130 ppm. LV pacing only.   Lead(s): stable.   Battery longevity: 11yrs, 4mo estimated.   Presenting: AS- 90 bpm.   Atrial high rates: since 3/4/24; 22 fast A&V episodes, limited EGMs all appear to be ST/ bpm, unable to see onset/termination.   Anticoagulant: None.   Ventricular High rates: since 3/4/24; two NSVT detected.   Comments: Normal device function. Routed to device RN to review low LV pacing.     ECHO (report reviewed):   TTE 3/25/24  The visual ejection fraction is 40-45%.  There is septal akinesis.  There is severe inferior wall hypokinesis.  The right ventricle is normal in size and function.  There is a ICD lead in the right ventricle.  No significant valve disease.  Compared to the prior study dated 12/24/2023, there are changes as noted.  Overall LV systolic function has improved  although there is evidence of inferior and septal wall motion abnormalities.    Cardiac MRI 12/26/23  1.  Left ventricle is moderately enlarged with severe reduction in global systolic function. There is severe desynchrony related to conduction abnormality (LBBB).  Normal wall thickness .  The quantified left ventricular ejection fraction is 21%.  There is areas of patchy epicardial and mid myocardial enhancement/fibrosis on LGE imaging most consistent with myocarditis.  Pre-contrast T1 is abnormal and elevated at 1160 ms.  ECV: 33.6% (elevated, abnormal).   2.  Mild enlargement of right ventricle with severe reduction in systolic function.  RVEF:22%  3.  No significant valvular abnormalities.  4.  Normal right and left atrial size.   5.  There is a small pericardial effusion.    Cardiac cath: from 12/26/23 demonstrated   Left Main   The vessel was visualized by selective angiography and is moderate in size. There was 0% vessel disease.      Left Anterior Descending   The vessel was visualized by selective angiography and is moderate in size. There was 0% vessel disease.      Ramus Intermedius   The vessel was visualized by selective angiography and is moderate in size. There was 0% vessel disease.      Left Circumflex   The vessel was visualized by selective angiography and is moderate in size. There was 0% vessel disease.      Right Coronary Artery   The vessel was visualized by selective angiography and is moderate in size. There was 0% vessel disease.             Medications  Allergies   Current Outpatient Medications   Medication Sig Dispense Refill    BIOTIN PO Take 1 tablet by mouth daily      Cholecalciferol (VITAMIN D-3 PO) Take 1 tablet by mouth daily      DENTA 5000 PLUS 1.1 % CREA Take 1 g by mouth daily      losartan (COZAAR) 25 MG tablet Take 1 tablet (25 mg) by mouth daily 90 tablet 3    MAGNESIUM GLYCINATE PO Take 2 tablets by mouth daily as needed      metoprolol succinate ER (TOPROL XL) 50 MG 24 hr  tablet Take 1 tablet (50 mg) by mouth daily 90 tablet 3    MILK THISTLE PO Take 1 tablet by mouth daily      Turmeric (QC TUMERIC COMPLEX PO) Take 1 tablet by mouth daily      UNABLE TO FIND Take 1 tablet by mouth daily MEDICATION NAME: Zyrtec      vitamin B complex with vitamin C (VITAMIN  B COMPLEX) tablet as directed Orally      lidocaine (XYLOCAINE) 5 % external ointment Apply topically nightly as needed for moderate pain (Itching) (Patient not taking: Reported on 5/14/2024)        Allergies   Allergen Reactions    Lanolin Rash        Physical Examination Review of Systems   Vitals: /68 (BP Location: Right arm, Patient Position: Sitting, Cuff Size: Adult Regular)   Pulse 76   Resp 20   Wt 60.9 kg (134 lb 3.2 oz)   SpO2 96%   BMI 19.26 kg/m    BMI= Body mass index is 19.26 kg/m .  Wt Readings from Last 3 Encounters:   05/14/24 60.9 kg (134 lb 3.2 oz)   03/01/24 60.3 kg (133 lb)   02/08/24 61.1 kg (134 lb 12.8 oz)       General: pleasant female. No acute distress.   Neck: No JVD  Lungs: clear to auscultation  COR: normal rate, regular rhythm, No murmurs, rubs, or gallops  Extrem: No edema        Please refer above for cardiac ROS details.       Past History       Social History:   Social History     Socioeconomic History    Marital status:      Spouse name: Not on file    Number of children: Not on file    Years of education: Not on file    Highest education level: Not on file   Occupational History    Not on file   Tobacco Use    Smoking status: Never     Passive exposure: Never    Smokeless tobacco: Never   Substance and Sexual Activity    Alcohol use: Not on file    Drug use: Not on file    Sexual activity: Not on file   Other Topics Concern    Not on file   Social History Narrative    Not on file     Social Determinants of Health     Financial Resource Strain: Not on file   Food Insecurity: Not on file   Transportation Needs: Not on file   Physical Activity: Not on file   Stress: Not on file    Social Connections: Not on file   Interpersonal Safety: Not on file   Housing Stability: Not on file            Lab Results    Chemistry/lipid CBC Cardiac Enzymes/BNP/TSH/INR   Lab Results   Component Value Date    CHOL 216 (H) 12/24/2023     12/24/2023    TRIG 76 12/24/2023    BUN 11.5 02/08/2024     02/08/2024    CO2 28 02/08/2024    Lab Results   Component Value Date    WBC 5.4 12/29/2023    HGB 12.0 12/29/2023    HCT 36.3 12/29/2023    MCV 99 12/29/2023     (L) 12/29/2023    Lab Results   Component Value Date    TSH 4.42 (H) 02/08/2024

## 2024-05-14 NOTE — PATIENT INSTRUCTIONS
It was a pleasure to meet with you today.      Below is a summary of your visit.   Decrease metoprolol to 25 mg daily  Continue losartan and other medications  Continue to be generally active.   Continue the magnesium glycinate  Follow up with Antonina in the heart failure clinic as planned and with me in a year.    Increasing the magnesium in your diet may help to suppress your PVCs. These foods include the following  Dark chocolate  Avocados  Nuts, such as almonds, cashews, Brazil nuts  Legumes, such as lentils, beans, chickpeas, peas, soy beans  Tofu  Seeds, such as flax, pumpkin, and sergio  Whole grains such as Quinoa and buckwheat  Certain kinds of fish, such as [place], Mackerel, and Halibut  Bananas  Leafy greens, such as kale, spinach, and collared greens       Please do not hesitate to call the [facility] with any questions or concerns at [phone]. You can also reach my nurse, Joanna, at [phone].    Sincerely,

## 2024-06-05 DIAGNOSIS — I47.29 NSVT (NONSUSTAINED VENTRICULAR TACHYCARDIA) (H): ICD-10-CM

## 2024-06-05 DIAGNOSIS — I42.8 NON-ISCHEMIC CARDIOMYOPATHY (H): ICD-10-CM

## 2024-06-05 RX ORDER — METOPROLOL SUCCINATE 50 MG/1
50 TABLET, EXTENDED RELEASE ORAL DAILY
Status: SHIPPED
Start: 2024-06-05 | End: 2024-06-12

## 2024-06-05 NOTE — TELEPHONE ENCOUNTER
Notified Pt, she is in agreement. Message sent to scheduling for HF NP-lissy    From: Tao Huggins MD  Sent: 6/4/2024   5:05 PM CDT  To: Joanna Louise; Lior Moreno MD; Malachi Jaramillo RN; *    Her blood pressure at her OV on 5/10 was 100/68 mmHg. We can increase her metoprolol back to 50 mg daily.   ANDREW

## 2024-06-12 ENCOUNTER — TELEPHONE (OUTPATIENT)
Dept: CARDIOLOGY | Facility: CLINIC | Age: 64
End: 2024-06-12
Payer: COMMERCIAL

## 2024-06-12 DIAGNOSIS — I42.8 NON-ISCHEMIC CARDIOMYOPATHY (H): ICD-10-CM

## 2024-06-12 DIAGNOSIS — I47.29 NSVT (NONSUSTAINED VENTRICULAR TACHYCARDIA) (H): ICD-10-CM

## 2024-06-12 RX ORDER — METOPROLOL SUCCINATE 50 MG/1
50 TABLET, EXTENDED RELEASE ORAL DAILY
Qty: 90 TABLET | Refills: 2 | Status: SHIPPED | OUTPATIENT
Start: 2024-06-12

## 2024-06-12 NOTE — TELEPHONE ENCOUNTER
[facility] Call [facility]    Phone Message    May a detailed message be left on voicemail: yes     Reason for Call: Medication Refill Request    Has the patient contacted the pharmacy for the refill? Yes   Name of medication being requested: metoprolol succinate ER (TOPROL XL) 50 MG 24 hr tablet   Provider who prescribed the medication: Bhavna   Pharmacy:    [facility] [id number] [facility] - [place], [place] - [address]    Date medication is needed: 1 left only tonight        Action Taken: Other: cardiology     Travel Screening: Not Applicable    Thank you!  [facility]       Date of Service:

## 2024-08-23 ENCOUNTER — ANCILLARY PROCEDURE (OUTPATIENT)
Dept: CARDIOLOGY | Facility: CLINIC | Age: 64
End: 2024-08-23
Attending: INTERNAL MEDICINE
Payer: COMMERCIAL

## 2024-08-23 DIAGNOSIS — Z95.810 ICD (IMPLANTABLE CARDIOVERTER-DEFIBRILLATOR) IN PLACE: ICD-10-CM

## 2024-08-23 DIAGNOSIS — I25.5 ISCHEMIC CARDIOMYOPATHY: ICD-10-CM

## 2024-08-23 DIAGNOSIS — I47.29 PAROXYSMAL VENTRICULAR TACHYCARDIA (H): ICD-10-CM

## 2024-08-23 LAB
MDC_IDC_EPISODE_DTM: NORMAL
MDC_IDC_EPISODE_DURATION: 1 S
MDC_IDC_EPISODE_DURATION: 1 S
MDC_IDC_EPISODE_DURATION: 10 S
MDC_IDC_EPISODE_DURATION: 14 S
MDC_IDC_EPISODE_DURATION: 16 S
MDC_IDC_EPISODE_DURATION: 17 S
MDC_IDC_EPISODE_DURATION: 19 S
MDC_IDC_EPISODE_DURATION: 23 S
MDC_IDC_EPISODE_DURATION: 23 S
MDC_IDC_EPISODE_DURATION: 27 S
MDC_IDC_EPISODE_DURATION: 31 S
MDC_IDC_EPISODE_DURATION: 33 S
MDC_IDC_EPISODE_DURATION: 40 S
MDC_IDC_EPISODE_DURATION: 43 S
MDC_IDC_EPISODE_DURATION: 49 S
MDC_IDC_EPISODE_DURATION: 58 S
MDC_IDC_EPISODE_DURATION: 59 S
MDC_IDC_EPISODE_DURATION: 6 S
MDC_IDC_EPISODE_DURATION: 7 S
MDC_IDC_EPISODE_DURATION: 76 S
MDC_IDC_EPISODE_DURATION: 8 S
MDC_IDC_EPISODE_ID: 29
MDC_IDC_EPISODE_ID: 30
MDC_IDC_EPISODE_ID: 31
MDC_IDC_EPISODE_ID: 32
MDC_IDC_EPISODE_ID: 33
MDC_IDC_EPISODE_ID: 34
MDC_IDC_EPISODE_ID: 35
MDC_IDC_EPISODE_ID: 36
MDC_IDC_EPISODE_ID: 37
MDC_IDC_EPISODE_ID: 38
MDC_IDC_EPISODE_ID: 39
MDC_IDC_EPISODE_ID: 40
MDC_IDC_EPISODE_ID: 41
MDC_IDC_EPISODE_ID: 42
MDC_IDC_EPISODE_ID: 43
MDC_IDC_EPISODE_ID: 44
MDC_IDC_EPISODE_ID: 45
MDC_IDC_EPISODE_ID: 46
MDC_IDC_EPISODE_ID: 47
MDC_IDC_EPISODE_ID: 48
MDC_IDC_EPISODE_ID: 49
MDC_IDC_EPISODE_ID: 50
MDC_IDC_EPISODE_ID: 51
MDC_IDC_EPISODE_TYPE: NORMAL
MDC_IDC_LEAD_CONNECTION_STATUS: NORMAL
MDC_IDC_LEAD_IMPLANT_DT: NORMAL
MDC_IDC_LEAD_LOCATION: NORMAL
MDC_IDC_LEAD_LOCATION_DETAIL_1: NORMAL
MDC_IDC_LEAD_MFG: NORMAL
MDC_IDC_LEAD_MODEL: NORMAL
MDC_IDC_LEAD_POLARITY_TYPE: NORMAL
MDC_IDC_LEAD_SERIAL: NORMAL
MDC_IDC_LEAD_SPECIAL_FUNCTION: NORMAL
MDC_IDC_MSMT_BATTERY_DTM: NORMAL
MDC_IDC_MSMT_BATTERY_REMAINING_LONGEVITY: 133 MO
MDC_IDC_MSMT_BATTERY_RRT_TRIGGER: NORMAL
MDC_IDC_MSMT_BATTERY_VOLTAGE: 3.04 V
MDC_IDC_MSMT_CAP_CHARGE_DTM: NORMAL
MDC_IDC_MSMT_CAP_CHARGE_ENERGY: 18 J
MDC_IDC_MSMT_CAP_CHARGE_TIME: 3.8 S
MDC_IDC_MSMT_CAP_CHARGE_TYPE: NORMAL
MDC_IDC_MSMT_LEADCHNL_LV_IMPEDANCE_VALUE: 323 OHM
MDC_IDC_MSMT_LEADCHNL_LV_IMPEDANCE_VALUE: 437 OHM
MDC_IDC_MSMT_LEADCHNL_LV_IMPEDANCE_VALUE: 684 OHM
MDC_IDC_MSMT_LEADCHNL_LV_PACING_THRESHOLD_AMPLITUDE: 1 V
MDC_IDC_MSMT_LEADCHNL_LV_PACING_THRESHOLD_PULSEWIDTH: 0.4 MS
MDC_IDC_MSMT_LEADCHNL_RA_IMPEDANCE_VALUE: 513 OHM
MDC_IDC_MSMT_LEADCHNL_RA_SENSING_INTR_AMPL: 2 MV
MDC_IDC_MSMT_LEADCHNL_RV_IMPEDANCE_VALUE: 323 OHM
MDC_IDC_MSMT_LEADCHNL_RV_IMPEDANCE_VALUE: 437 OHM
MDC_IDC_MSMT_LEADCHNL_RV_SENSING_INTR_AMPL: 19.9 MV
MDC_IDC_PG_IMPLANT_DTM: NORMAL
MDC_IDC_PG_MFG: NORMAL
MDC_IDC_PG_MODEL: NORMAL
MDC_IDC_PG_SERIAL: NORMAL
MDC_IDC_PG_TYPE: NORMAL
MDC_IDC_SESS_CLINIC_NAME: NORMAL
MDC_IDC_SESS_DTM: NORMAL
MDC_IDC_SESS_TYPE: NORMAL
MDC_IDC_SET_BRADY_AT_MODE_SWITCH_RATE: 171 {BEATS}/MIN
MDC_IDC_SET_BRADY_LOWRATE: 60 {BEATS}/MIN
MDC_IDC_SET_BRADY_MAX_SENSOR_RATE: 120 {BEATS}/MIN
MDC_IDC_SET_BRADY_MAX_TRACKING_RATE: 130 {BEATS}/MIN
MDC_IDC_SET_BRADY_MODE: NORMAL
MDC_IDC_SET_BRADY_PAV_DELAY_HIGH: 100 MS
MDC_IDC_SET_BRADY_PAV_DELAY_LOW: 130 MS
MDC_IDC_SET_BRADY_SAV_DELAY_HIGH: 70 MS
MDC_IDC_SET_BRADY_SAV_DELAY_LOW: 100 MS
MDC_IDC_SET_CRT_PACED_CHAMBERS: NORMAL
MDC_IDC_SET_LEADCHNL_LV_PACING_AMPLITUDE: 1.5 V
MDC_IDC_SET_LEADCHNL_LV_PACING_ANODE_ELECTRODE_1: NORMAL
MDC_IDC_SET_LEADCHNL_LV_PACING_ANODE_LOCATION_1: NORMAL
MDC_IDC_SET_LEADCHNL_LV_PACING_CAPTURE_MODE: NORMAL
MDC_IDC_SET_LEADCHNL_LV_PACING_CATHODE_ELECTRODE_1: NORMAL
MDC_IDC_SET_LEADCHNL_LV_PACING_CATHODE_LOCATION_1: NORMAL
MDC_IDC_SET_LEADCHNL_LV_PACING_POLARITY: NORMAL
MDC_IDC_SET_LEADCHNL_LV_PACING_PULSEWIDTH: 0.4 MS
MDC_IDC_SET_LEADCHNL_RA_PACING_AMPLITUDE: 1.5 V
MDC_IDC_SET_LEADCHNL_RA_PACING_ANODE_ELECTRODE_1: NORMAL
MDC_IDC_SET_LEADCHNL_RA_PACING_ANODE_LOCATION_1: NORMAL
MDC_IDC_SET_LEADCHNL_RA_PACING_CATHODE_ELECTRODE_1: NORMAL
MDC_IDC_SET_LEADCHNL_RA_PACING_CATHODE_LOCATION_1: NORMAL
MDC_IDC_SET_LEADCHNL_RA_PACING_POLARITY: NORMAL
MDC_IDC_SET_LEADCHNL_RA_PACING_PULSEWIDTH: 0.4 MS
MDC_IDC_SET_LEADCHNL_RA_SENSING_ANODE_ELECTRODE_1: NORMAL
MDC_IDC_SET_LEADCHNL_RA_SENSING_ANODE_LOCATION_1: NORMAL
MDC_IDC_SET_LEADCHNL_RA_SENSING_CATHODE_ELECTRODE_1: NORMAL
MDC_IDC_SET_LEADCHNL_RA_SENSING_CATHODE_LOCATION_1: NORMAL
MDC_IDC_SET_LEADCHNL_RA_SENSING_POLARITY: NORMAL
MDC_IDC_SET_LEADCHNL_RA_SENSING_SENSITIVITY: 0.3 MV
MDC_IDC_SET_LEADCHNL_RV_SENSING_ANODE_ELECTRODE_1: NORMAL
MDC_IDC_SET_LEADCHNL_RV_SENSING_ANODE_LOCATION_1: NORMAL
MDC_IDC_SET_LEADCHNL_RV_SENSING_CATHODE_ELECTRODE_1: NORMAL
MDC_IDC_SET_LEADCHNL_RV_SENSING_CATHODE_LOCATION_1: NORMAL
MDC_IDC_SET_LEADCHNL_RV_SENSING_POLARITY: NORMAL
MDC_IDC_SET_LEADCHNL_RV_SENSING_SENSITIVITY: 0.3 MV
MDC_IDC_SET_ZONE_DETECTION_BEATS_DENOMINATOR: 16 {BEATS}
MDC_IDC_SET_ZONE_DETECTION_BEATS_DENOMINATOR: 32 {BEATS}
MDC_IDC_SET_ZONE_DETECTION_BEATS_DENOMINATOR: 40 {BEATS}
MDC_IDC_SET_ZONE_DETECTION_BEATS_NUMERATOR: 16 {BEATS}
MDC_IDC_SET_ZONE_DETECTION_BEATS_NUMERATOR: 30 {BEATS}
MDC_IDC_SET_ZONE_DETECTION_BEATS_NUMERATOR: 32 {BEATS}
MDC_IDC_SET_ZONE_DETECTION_INTERVAL: 320 MS
MDC_IDC_SET_ZONE_DETECTION_INTERVAL: 350 MS
MDC_IDC_SET_ZONE_DETECTION_INTERVAL: 360 MS
MDC_IDC_SET_ZONE_DETECTION_INTERVAL: 400 MS
MDC_IDC_SET_ZONE_STATUS: NORMAL
MDC_IDC_SET_ZONE_TYPE: NORMAL
MDC_IDC_SET_ZONE_VENDOR_TYPE: NORMAL
MDC_IDC_STAT_AT_BURDEN_PERCENT: 0 %
MDC_IDC_STAT_AT_DTM_END: NORMAL
MDC_IDC_STAT_AT_DTM_START: NORMAL
MDC_IDC_STAT_BRADY_DTM_END: NORMAL
MDC_IDC_STAT_BRADY_DTM_START: NORMAL
MDC_IDC_STAT_BRADY_RA_PERCENT_PACED: 0 %
MDC_IDC_STAT_BRADY_RV_PERCENT_PACED: 0 %
MDC_IDC_STAT_CRT_DTM_END: NORMAL
MDC_IDC_STAT_CRT_DTM_START: NORMAL
MDC_IDC_STAT_CRT_LV_PERCENT_PACED: 88.1 %
MDC_IDC_STAT_CRT_PERCENT_PACED: 0 %
MDC_IDC_STAT_EPISODE_RECENT_COUNT: 0
MDC_IDC_STAT_EPISODE_RECENT_COUNT: 2
MDC_IDC_STAT_EPISODE_RECENT_COUNT: 21
MDC_IDC_STAT_EPISODE_RECENT_COUNT_DTM_END: NORMAL
MDC_IDC_STAT_EPISODE_RECENT_COUNT_DTM_START: NORMAL
MDC_IDC_STAT_EPISODE_TOTAL_COUNT: 0
MDC_IDC_STAT_EPISODE_TOTAL_COUNT: 46
MDC_IDC_STAT_EPISODE_TOTAL_COUNT: 5
MDC_IDC_STAT_EPISODE_TOTAL_COUNT_DTM_END: NORMAL
MDC_IDC_STAT_EPISODE_TOTAL_COUNT_DTM_START: NORMAL
MDC_IDC_STAT_EPISODE_TYPE: NORMAL
MDC_IDC_STAT_TACHYTHERAPY_ATP_DELIVERED_RECENT: 0
MDC_IDC_STAT_TACHYTHERAPY_ATP_DELIVERED_TOTAL: 0
MDC_IDC_STAT_TACHYTHERAPY_RECENT_DTM_END: NORMAL
MDC_IDC_STAT_TACHYTHERAPY_RECENT_DTM_START: NORMAL
MDC_IDC_STAT_TACHYTHERAPY_SHOCKS_ABORTED_RECENT: 0
MDC_IDC_STAT_TACHYTHERAPY_SHOCKS_ABORTED_TOTAL: 0
MDC_IDC_STAT_TACHYTHERAPY_SHOCKS_DELIVERED_RECENT: 0
MDC_IDC_STAT_TACHYTHERAPY_SHOCKS_DELIVERED_TOTAL: 0
MDC_IDC_STAT_TACHYTHERAPY_TOTAL_DTM_END: NORMAL
MDC_IDC_STAT_TACHYTHERAPY_TOTAL_DTM_START: NORMAL

## 2024-08-23 PROCEDURE — 93296 REM INTERROG EVL PM/IDS: CPT | Performed by: INTERNAL MEDICINE

## 2024-08-23 PROCEDURE — 93295 DEV INTERROG REMOTE 1/2/MLT: CPT | Performed by: INTERNAL MEDICINE

## 2024-09-29 ENCOUNTER — HEALTH MAINTENANCE LETTER (OUTPATIENT)
Age: 64
End: 2024-09-29

## 2025-01-18 ENCOUNTER — HEALTH MAINTENANCE LETTER (OUTPATIENT)
Age: 65
End: 2025-01-18

## 2025-02-11 ENCOUNTER — TELEPHONE (OUTPATIENT)
Dept: CARDIOLOGY | Facility: CLINIC | Age: 65
End: 2025-02-11
Payer: COMMERCIAL

## 2025-02-11 NOTE — TELEPHONE ENCOUNTER
Encounter Type: routine remote CRT-D transmission.   Device: Medtronic Cobalt XT HF.  Pacing %/Programmed: AP <1%, CSP 79% at DDD 60/130 ppm.   Lead(s): stable.   Battery longevity: 10yrs, 9mo estimated.   Presenting: AS - CSP 75 bpm. T wave oversensing noted.   Atrial high rates: since 8/23/24; 12 SVT episodes, unable to see onset/termination, EGMs suggest 1:1 ST/ bpm.  Anticoagulant: None.   Ventricular High rates: since 8/23/24; three nonsustained in the VT zone, EGMs show T wave oversensing. no innappropriate therapies noted.   Comments: Routed to device RN to review T wave oversensing.  Plan: Patient due for annual device check in February 2025. Reminder letter sent. KAYLEY Rojas, Device Specialist  ADD: agree with above. Called TS who suggested ruducing RV sensitivity to 0.45 mV. Will consider this at clinic visit, due Feb, 2025. CL

## 2025-03-01 DIAGNOSIS — I47.29 NSVT (NONSUSTAINED VENTRICULAR TACHYCARDIA) (H): ICD-10-CM

## 2025-03-01 DIAGNOSIS — I42.8 NON-ISCHEMIC CARDIOMYOPATHY (H): ICD-10-CM

## 2025-03-04 RX ORDER — METOPROLOL SUCCINATE 50 MG/1
50 TABLET, EXTENDED RELEASE ORAL DAILY
Qty: 90 TABLET | Refills: 0 | Status: SHIPPED | OUTPATIENT
Start: 2025-03-04

## 2025-05-17 DIAGNOSIS — R93.1 LVEF <40%: ICD-10-CM

## 2025-05-19 RX ORDER — LOSARTAN POTASSIUM 25 MG/1
25 TABLET ORAL DAILY
Qty: 90 TABLET | Refills: 0 | OUTPATIENT
Start: 2025-05-19

## 2025-05-19 RX ORDER — LOSARTAN POTASSIUM 25 MG/1
25 TABLET ORAL DAILY
Qty: 60 TABLET | Refills: 0 | Status: SHIPPED | OUTPATIENT
Start: 2025-05-19

## 2025-05-19 NOTE — TELEPHONE ENCOUNTER
Patient called in and scheduled device check and appt with provider please refill ASAP, she is out, thanks!       [facility] [place] [facility] - [place], [place] - [address]

## 2025-05-31 DIAGNOSIS — I42.8 NON-ISCHEMIC CARDIOMYOPATHY (H): ICD-10-CM

## 2025-05-31 DIAGNOSIS — I47.29 NSVT (NONSUSTAINED VENTRICULAR TACHYCARDIA) (H): ICD-10-CM

## 2025-06-02 RX ORDER — METOPROLOL SUCCINATE 50 MG/1
50 TABLET, EXTENDED RELEASE ORAL DAILY
Qty: 90 TABLET | Refills: 0 | Status: SHIPPED | OUTPATIENT
Start: 2025-06-02

## 2025-06-18 ENCOUNTER — MYC MEDICAL ADVICE (OUTPATIENT)
Dept: CARDIOLOGY | Facility: CLINIC | Age: 65
End: 2025-06-18
Payer: COMMERCIAL

## 2025-06-18 DIAGNOSIS — Z87.898 HX OF PROLONGED Q-T INTERVAL ON ECG: Primary | ICD-10-CM

## 2025-06-19 NOTE — TELEPHONE ENCOUNTER
Spoke with patient, she is asking if  will refer her for genetic testing for Long QT syndrome so she is able to share the information with her daughters.-jessie

## 2025-06-25 NOTE — TELEPHONE ENCOUNTER
[facility] updated with 's response. Referral placed.-njm  ___________  ----- Message -----  From: Tao Huggins MD  Sent: 6/25/2025   3:13 PM CDT  To: Flavia Norman, RN; [facility] Cv Rn Team Y  Subject: FW: Long QT Hereditary Question                  I am okay with a genetics referral. I suspect her prolonged QT interval is due to her pacing and previous development of LBBB, but there is no harm in a discussion with a  regarding   risks/benefits of genetic testing.   JKH  ----- Message -----  From: Flavia Norman RN  Sent: 6/19/2025  10:17 AM CDT  To: Tao Huggins MD  Subject: Long QT Hereditary Question                      ----- Message from Flavia Norman RN sent at 6/19/2025 10:17 AM CDT -----    Please see notes and advise if referral may be sent to genetics.  Thank you.  -jessie

## 2025-07-14 DIAGNOSIS — R93.1 LVEF <40%: ICD-10-CM

## 2025-07-14 RX ORDER — LOSARTAN POTASSIUM 25 MG/1
25 TABLET ORAL DAILY
Qty: 90 TABLET | Refills: 0 | Status: SHIPPED | OUTPATIENT
Start: 2025-07-14 | End: 2025-07-17

## 2025-07-17 ENCOUNTER — OFFICE VISIT (OUTPATIENT)
Dept: CARDIOLOGY | Facility: CLINIC | Age: 65
End: 2025-07-17
Payer: COMMERCIAL

## 2025-07-17 VITALS
SYSTOLIC BLOOD PRESSURE: 128 MMHG | HEART RATE: 74 BPM | WEIGHT: 146 LBS | HEIGHT: 70 IN | RESPIRATION RATE: 17 BRPM | BODY MASS INDEX: 20.9 KG/M2 | DIASTOLIC BLOOD PRESSURE: 76 MMHG

## 2025-07-17 DIAGNOSIS — I42.8 NONISCHEMIC CARDIOMYOPATHY (H): Primary | ICD-10-CM

## 2025-07-17 DIAGNOSIS — R53.83 FATIGUE, UNSPECIFIED TYPE: ICD-10-CM

## 2025-07-17 DIAGNOSIS — I47.29 NSVT (NONSUSTAINED VENTRICULAR TACHYCARDIA) (H): ICD-10-CM

## 2025-07-17 DIAGNOSIS — I42.8 NON-ISCHEMIC CARDIOMYOPATHY (H): ICD-10-CM

## 2025-07-17 RX ORDER — METOPROLOL SUCCINATE 50 MG/1
50 TABLET, EXTENDED RELEASE ORAL DAILY
Qty: 90 TABLET | Refills: 3 | Status: SHIPPED | OUTPATIENT
Start: 2025-07-17

## 2025-07-17 RX ORDER — LOSARTAN POTASSIUM 25 MG/1
25 TABLET ORAL DAILY
Qty: 90 TABLET | Refills: 3 | Status: SHIPPED | OUTPATIENT
Start: 2025-07-17

## 2025-07-17 NOTE — LETTER
7/17/2025    Sadie Kimball MD  [address] [facility] [address]    RE: Sherry King       Dear Colleague,     I had the pleasure of seeing Sherry King in the [facility].    HEART CARE ENCOUNTER CONSULTATON NOTE      [facility]  [phone]      Assessment/Recommendations   Assessment:   Nonischemic cardiomyopathy, chronic HFrEF, LBBB s/p CRT-D: EF improved from 21% to 45% 12/2023-3/2024. Normal coronary arteries on angiogram 12/2023.  Metoprolol succinate 50 mg daily, losartan 25 mg daily  - has missed/cancelled several appt and device checks.  Appears compensated on exam, no JVD.  NSVT: Metoprolol succinate 50 mg daily - no palpitations  Fatigue: Seems to be progressed over the last year.  Did discuss at last cardiology visit.  She has not had any labs or workup done since early 2024.  At that time TSH was mildly elevated.  Consider sleep apnea with her children reporting snoring, although denies apnea.  She does have swelling in legs and feet that is chronic and unchanged.  Does not recall if improved on lower metoprolol succinate.      Plan:   Labs ordered today, for standard monitoring/fatigue workup  Device check next month  Repeat echocardiogram monitoring of nonischemic cardiomyopathy  Encouraged to schedule follow-up with PCP  Consultation for prolonged QT upcoming    Patient is going to check with billing/insurance that orders today    Follow up in 1 year or sooner pending results     History of Present Illness/Subjective    HPI: Sherry King is a 64 year old female with PMHx of nonischemic CM, HFrEF, LBBB, CRT-D, NSVT presents for follow up.    Patient reports ongoing fatigue, seems to be worsening over the last year.  Also feels she is gaining weight steadily over the last years, notices swelling in legs and hands.  She wears cotton compression stockings on lower extremities.  She stands a lot working in retail.  Occasionally feels a chest  "tightness.  Denies palpitations or racing heart rate with this with history of NSVT seen on device check.  Metoprolol previously reduced, but increased back to 50 mg daily due to this finding.  Does not recall improvement in fatigue with that change.  Has not had device check for 6 months.  Is scheduled for in person check next month.  A year ago LVEF was improved to 40-45% following CRT-D placement, previously 21% per echocardiogram monitoring.   Her kids have told her she snores.  She wakes up in the night frequently, but it is is to use the bathroom.  Denies orthopnea or PND.  Magnesium supplement.  Denies lightheadedness or dizziness.      Echocardiogram 3/2024 results:  The visual ejection fraction is 40-45%.  There is septal akinesis.  There is severe inferior wall hypokinesis.  The right ventricle is normal in size and function.  There is a ICD lead in the right ventricle.  No significant valve disease.  Compared to the prior study dated 12/24/2023, there are changes as noted.  Overall LV systolic function has improved although there is evidence of  inferior and septal wall motion abnormalities.     Physical Examination  Review of Systems   Vitals: /76 (BP Location: Right arm, Patient Position: Sitting, Cuff Size: Adult Regular)   Pulse 74   Resp 17   Ht 1.778 m (5' 10\")   Wt 66.2 kg (146 lb)   BMI 20.95 kg/m    BMI= Body mass index is 20.95 kg/m .  Wt Readings from Last 3 Encounters:   07/17/25 66.2 kg (146 lb)   05/14/24 60.9 kg (134 lb 3.2 oz)   03/01/24 60.3 kg (133 lb)           ENT/Mouth: membranes moist, no oral lesions or bleeding gums.      EYES:  no scleral icterus, normal conjunctivae                    Neck: No carotid bruit or thyromegaly   Chest/Lungs:   lungs are clear to auscultation, no rales or wheezing,  equal chest wall expansion    Cardiovascular:   Regular. Normal first and second heart sounds with no murmurs, rubs, or gallops; the radial  pulses are intact, Jugular venous " pressure normal, absent edema bilaterally wearing compression garments       Extremities: no cyanosis or clubbing   Skin: no xanthelasma, warm.    Neurologic:  no tremors     Psychiatric: alert and oriented x3, calm        Please refer above for cardiac ROS details.        Medical History  Surgical History Family History Social History   Past Medical History:   Diagnosis Date     Biventricular heart failure (H) 02/08/2024     Cardiac arrest (H) 02/08/2024     Cardiac resynchronization therapy defibrillator (CRT-D) in place 12/29/2023     Congestive heart failure (H)      Hypertension      Nonischemic cardiomyopathy (H) 02/08/2024     Primary hypertension 02/08/2024     Thyroid disease      Past Surgical History:   Procedure Laterality Date     CORONARY ANGIOGRAPHY ADULT ORDER       CV CORONARY ANGIOGRAM N/A 12/26/2023    Procedure: Coronary Angiogram;  Surgeon: Simon Kwon MD;  Location: [facility] CV     EP ICD & LEAD IMPLANT-HIS LEAD BIVENTRICULAR N/A 12/28/2023    Procedure: Implantable Cardioverter Defibrillator Device & Lead Implant - HIS Lead Biventricular;  Surgeon: Lior Moreno MD;  Location: [facility] CV     IMPLANT AUTOMATIC IMPLANTABLE CARDIOVERTER DEFIBRILLATOR       PICC TRIPLE LUMEN PLACEMENT  12/25/2023     No family history on file.     Social History     Socioeconomic History     Marital status:      Spouse name: Not on file     Number of children: Not on file     Years of education: Not on file     Highest education level: Not on file   Occupational History     Not on file   Tobacco Use     Smoking status: Never     Passive exposure: Never     Smokeless tobacco: Never   Substance and Sexual Activity     Alcohol use: Not on file     Drug use: Not on file     Sexual activity: Not on file   Other Topics Concern     Not on file   Social History Narrative     Not on file     Social Drivers of Health     Financial Resource Strain: Not on file   Food Insecurity: Not on file    Transportation Needs: Not on file   Physical Activity: Not on file   Stress: Not on file   Social Connections: Not on file   Interpersonal Safety: Not on file   Housing Stability: Not on file           Medications  Allergies   Current Outpatient Medications   Medication Sig Dispense Refill     BIOTIN PO Take 1 tablet by mouth daily       Cholecalciferol (VITAMIN D-3 PO) Take 1 tablet by mouth daily       DENTA 5000 PLUS 1.1 % CREA Take 1 g by mouth daily       losartan (COZAAR) 25 MG tablet TAKE 1 TABLET (25 MG) BY MOUTH DAILY. NO FURTHER REFILLS UNTIL SEEN 90 tablet 0     MAGNESIUM GLYCINATE PO Take 2 tablets by mouth daily as needed       metoprolol succinate ER (TOPROL XL) 50 MG 24 hr tablet TAKE 1 TABLET BY MOUTH EVERY DAY 90 tablet 0     MILK THISTLE PO Take 1 tablet by mouth daily       Turmeric (QC TUMERIC COMPLEX PO) Take 1 tablet by mouth daily       UNABLE TO FIND Take 1 tablet by mouth daily MEDICATION NAME: Zyrtec       vitamin B complex with vitamin C (VITAMIN  B COMPLEX) tablet as directed Orally       lidocaine (XYLOCAINE) 5 % external ointment Apply topically nightly as needed for moderate pain (Itching) (Patient not taking: Reported on 7/17/2025)         Allergies   Allergen Reactions     Lanolin Rash          Lab Results    Chemistry/lipid CBC Cardiac Enzymes/BNP/TSH/INR   Recent Labs   Lab Test 12/24/23  0428   CHOL 216*      LDL 98   TRIG 76     Recent Labs   Lab Test 12/24/23  0428   LDL 98     Recent Labs   Lab Test 02/08/24  0952      POTASSIUM 5.0   CHLORIDE 100   CO2 28   GLC 96   BUN 11.5   CR 0.75   GFRESTIMATED 88   YO 9.5     Recent Labs   Lab Test 02/08/24  0952 12/29/23  0644 12/28/23  0448   CR 0.75 0.60 0.64     Recent Labs   Lab Test 12/25/23  0335   A1C 5.1          Recent Labs   Lab Test 12/29/23  0644   WBC 5.4   HGB 12.0   HCT 36.3   MCV 99   *     Recent Labs   Lab Test 12/29/23  0644 12/28/23  0448 12/27/23  0457   HGB 12.0 12.3 12.9    No results for  "input(s): \"TROPONINI\" in the last 40598 hours.  Recent Labs   Lab Test 12/23/23  1757   NTBNPI 2,726*     Recent Labs   Lab Test 02/08/24  0952   TSH 4.42*     No results for input(s): \"INR\" in the last 45990 hours.       This note has been dictated using voice recognition software. Any grammatical, typographical, or context distortions are unintentional and inherent to the software    Rosa Sparrow PA-C                                       Thank you for allowing me to participate in the care of your patient.      Sincerely,     Rosa Cherry PA-C     [facility]  cc:   Antonina Hernandez, SHELLEY CNP  No address on file      "

## 2025-07-17 NOTE — PROGRESS NOTES
HEART CARE ENCOUNTER CONSULTATON NOTE      [facility]  [phone]      Assessment/Recommendations   Assessment:   Nonischemic cardiomyopathy, chronic HFrEF, LBBB s/p CRT-D: EF improved from 21% to 45% 12/2023-3/2024. Normal coronary arteries on angiogram 12/2023.  Metoprolol succinate 50 mg daily, losartan 25 mg daily  - has missed/cancelled several appt and device checks.  Appears compensated on exam, no JVD.  NSVT: Metoprolol succinate 50 mg daily - no palpitations  Fatigue: Seems to be progressed over the last year.  Did discuss at last cardiology visit.  She has not had any labs or workup done since early 2024.  At that time TSH was mildly elevated.  Consider sleep apnea with her children reporting snoring, although denies apnea.  She does have swelling in legs and feet that is chronic and unchanged.  Does not recall if improved on lower metoprolol succinate.      Plan:   Labs ordered today, for standard monitoring/fatigue workup  Device check next month  Repeat echocardiogram monitoring of nonischemic cardiomyopathy  Encouraged to schedule follow-up with PCP  Consultation for prolonged QT upcoming    Patient is going to check with billing/insurance that orders today    Follow up in 1 year or sooner pending results     History of Present Illness/Subjective    HPI: Sherry King is a 64 year old female with PMHx of nonischemic CM, HFrEF, LBBB, CRT-D, NSVT presents for follow up.    Patient reports ongoing fatigue, seems to be worsening over the last year.  Also feels she is gaining weight steadily over the last years, notices swelling in legs and hands.  She wears cotton compression stockings on lower extremities.  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  Denies palpitations or racing heart rate with this with history of NSVT seen on device check.  Metoprolol previously reduced, but increased back to 50 mg daily due to this finding.  Does not recall  "improvement in fatigue with that change.  Has not had device check for 6 months.  Is scheduled for in person check next month.  A year ago LVEF was improved to 40-45% following CRT-D placement, previously 21% per echocardiogram monitoring.   Her kids have told her she snores.  She wakes up in the night frequently, but it is is to use the bathroom.  Denies orthopnea or PND.  Magnesium supplement.  Denies lightheadedness or dizziness.      Echocardiogram 3/2024 results:  The visual ejection fraction is 40-45%.  There is septal akinesis.  There is severe inferior wall hypokinesis.  The right ventricle is normal in size and function.  There is a ICD lead in the right ventricle.  No significant valve disease.  Compared to the prior study dated 12/24/2023, there are changes as noted.  Overall LV systolic function has improved although there is evidence of  inferior and septal wall motion abnormalities.     Physical Examination  Review of Systems   Vitals: /76 (BP Location: Right arm, Patient Position: Sitting, Cuff Size: Adult Regular)   Pulse 74   Resp 17   Ht 1.778 m (5' 10\")   Wt 66.2 kg (146 lb)   BMI 20.95 kg/m    BMI= Body mass index is 20.95 kg/m .  Wt Readings from Last 3 Encounters:   07/17/25 66.2 kg (146 lb)   05/14/24 60.9 kg (134 lb 3.2 oz)   03/01/24 60.3 kg (133 lb)           ENT/Mouth: membranes moist, no oral lesions or bleeding gums.      EYES:  no scleral icterus, normal conjunctivae                    Neck: No carotid bruit or thyromegaly   Chest/Lungs:   lungs are clear to auscultation, no rales or wheezing,  equal chest wall expansion    Cardiovascular:   Regular. Normal first and second heart sounds with no murmurs, rubs, or gallops; the radial  pulses are intact, Jugular venous pressure normal, absent edema bilaterally wearing compression garments       Extremities: no cyanosis or clubbing   Skin: no xanthelasma, warm.    Neurologic:  no tremors     Psychiatric: alert and oriented x3, " calm        Please refer above for cardiac ROS details.        Medical History  Surgical History Family History Social History   Past Medical History:   Diagnosis Date    Biventricular heart failure (H) 02/08/2024    Cardiac arrest (H) 02/08/2024    Cardiac resynchronization therapy defibrillator (CRT-D) in place 12/29/2023    Congestive heart failure (H)     Hypertension     Nonischemic cardiomyopathy (H) 02/08/2024    Primary hypertension 02/08/2024    Thyroid disease      Past Surgical History:   Procedure Laterality Date    CORONARY ANGIOGRAPHY ADULT ORDER      CV CORONARY ANGIOGRAM N/A 12/26/2023    Procedure: Coronary Angiogram;  Surgeon: Simon Kwon MD;  Location: [facility] CATH LAB CV    EP ICD & LEAD IMPLANT-HIS LEAD BIVENTRICULAR N/A 12/28/2023    Procedure: Implantable Cardioverter Defibrillator Device & Lead Implant - HIS Lead Biventricular;  Surgeon: Lior Moreno MD;  Location: [facility] CATH LAB CV    IMPLANT AUTOMATIC IMPLANTABLE CARDIOVERTER DEFIBRILLATOR      PICC TRIPLE LUMEN PLACEMENT  12/25/2023     No family history on file.     Social History     Socioeconomic History    Marital status:      Spouse name: Not on file    Number of children: Not on file    Years of education: Not on file    Highest education level: Not on file   Occupational History    Not on file   Tobacco Use    Smoking status: Never     Passive exposure: Never    Smokeless tobacco: Never   Substance and Sexual Activity    Alcohol use: Not on file    Drug use: Not on file    Sexual activity: Not on file   Other Topics Concern    Not on file   Social History Narrative    Not on file     Social Drivers of Health     Financial Resource Strain: Not on file   Food Insecurity: Not on file   Transportation Needs: Not on file   Physical Activity: Not on file   Stress: Not on file   Social Connections: Not on file   Interpersonal Safety: Not on file   Housing Stability: Not on file           Medications  Allergies   Current  "Outpatient Medications   Medication Sig Dispense Refill    BIOTIN PO Take 1 tablet by mouth daily      Cholecalciferol (VITAMIN D-3 PO) Take 1 tablet by mouth daily      DENTA 5000 PLUS 1.1 % CREA Take 1 g by mouth daily      losartan (COZAAR) 25 MG tablet TAKE 1 TABLET (25 MG) BY MOUTH DAILY. NO FURTHER REFILLS UNTIL SEEN 90 tablet 0    MAGNESIUM GLYCINATE PO Take 2 tablets by mouth daily as needed      metoprolol succinate ER (TOPROL XL) 50 MG 24 hr tablet TAKE 1 TABLET BY MOUTH EVERY DAY 90 tablet 0    MILK THISTLE PO Take 1 tablet by mouth daily      Turmeric (QC TUMERIC COMPLEX PO) Take 1 tablet by mouth daily      UNABLE TO FIND Take 1 tablet by mouth daily MEDICATION NAME: Zyrtec      vitamin B complex with vitamin C (VITAMIN  B COMPLEX) tablet as directed Orally      lidocaine (XYLOCAINE) 5 % external ointment Apply topically nightly as needed for moderate pain (Itching) (Patient not taking: Reported on 7/17/2025)         Allergies   Allergen Reactions    Lanolin Rash          Lab Results    Chemistry/lipid CBC Cardiac Enzymes/BNP/TSH/INR   Recent Labs   Lab Test 12/24/23  0428   CHOL 216*      LDL 98   TRIG 76     Recent Labs   Lab Test 12/24/23  0428   LDL 98     Recent Labs   Lab Test 02/08/24  0952      POTASSIUM 5.0   CHLORIDE 100   CO2 28   GLC 96   BUN 11.5   CR 0.75   GFRESTIMATED 88   YO 9.5     Recent Labs   Lab Test 02/08/24  0952 12/29/23  0644 12/28/23  0448   CR 0.75 0.60 0.64     Recent Labs   Lab Test 12/25/23  0335   A1C 5.1          Recent Labs   Lab Test 12/29/23  0644   WBC 5.4   HGB 12.0   HCT 36.3   MCV 99   *     Recent Labs   Lab Test 12/29/23  0644 12/28/23  0448 12/27/23  0457   HGB 12.0 12.3 12.9    No results for input(s): \"TROPONINI\" in the last 29834 hours.  Recent Labs   Lab Test 12/23/23  1757   NTBNPI 2,726*     Recent Labs   Lab Test 02/08/24  0952   TSH 4.42*     No results for input(s): \"INR\" in the last 39196 hours.       This note has been dictated " using voice recognition software. Any grammatical, typographical, or context distortions are unintentional and inherent to the software    Rosa Sparrow PA-C

## 2025-07-28 NOTE — PROGRESS NOTES
Virtual Visit Details     Type of service:  Video Visit     Originating Location (pt. Location): Home  Distant Location (provider location):  Off-site  Platform used for Video Visit: Linda    Here is a copy of the progress note from your recent genetic counseling visit to the [facility] on Date: 2025.    PROGRESS NOTE: Sherry was referred by Tao Huggins MD for genetic counseling due to her history of nonischemic cardiomyopathy, cardiac arrest, and a prolonged QT interval. I had the opportunity to talk with Sherry today to discuss the genetic component of these conditions and testing options available to her.     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 She was admitted in 2023 for chest tightness, shortness of breath, and tachycardia, and suffered a VT arrest. She had a cardiac MRI at this time which showed the left ventricle to be moderately enlarged with severe reduction in global systolic function, LBBB, and areas of patchy epicardial and mid myocardial enhancement/fibrosis on LGE most consistent with myocarditis. She had a CRT-D placed after this. She was noted to have a prolonged QT interval during this time as well (QTc= 563).       FAMILY HISTORY: A detailed family history was obtained today and was significant for the following cardiac history:    Three daughters who are in good health. Two have a history of fainting, but this seems to be in the setting of standing up quickly.  Three sisters. One has a history of CAD and has had stents placed.  Father  In his 80's with a history of multiple heart attacks with the first being in his 60s.  Mother  at age 94. She had a history of stroke.    There is no additional history of cardiomyopathy, arrhythmias, heart attacks, sudden cardiac death, genetic conditions, or birth defects. (Scanned pedigree may be under media  tab)    DISCUSSION: We discussed Sherry's history of NICM, cardiac arrest, and a prolonged QT interval.      Non-ischemic cardiomyopathy results from causes other than loss of blood flow to the heart.  Cardiomyopathy can be caused by both acquired and genetic causes.  Acquired causes include exposure to toxins, injury related to coronary artery disease (ischemic), and chronic high blood pressure. When familial, it often results in dilated cardiomyopathy (DCM), where the left ventricle gets enlarged or stretched out.  There is a genetic basis found in 10-50% of DCM cases. Even when other causes are apparent, such as myocarditis, a potential underlying genetic component should not be ruled out.    Long QT syndrome (LQTS) is a disorder of the heart s electrical system that can lead to abnormal heart rhythms (arrhythmias). It is most often caused by genetic mutations, though it can also be acquired due to factors such as certain medications, electrolyte imbalances, or medical conditions. Inherited LQTS is associated with mutations in genes that regulate ion channels, which help control the electrical activity of the heart. This disruption can prolong the QT interval on an electrocardiogram (ECG), increasing the risk of life-threatening arrhythmias like torsades de pointes. Even when LQTS appears to be acquired, an underlying genetic predisposition may still contribute to the condition and should be considered.    Given Sherry's history of NICM and a prolonged QT interval, we discussed genetic testing via a comprehensive cardiomyopathy and arrhythmia panel to assess for genetic contributions to these conditions.    Reviewed autosomal dominant (AD) inheritance pattern most commonly associated with these conditions, including the 50% risk for recurrence. Briefly reviewed autosomal recessive  Inheritance for the rare cases in which that occurs. Explained that gene mutations are associated with reduced penetrance and  variable expressivity, meaning that individuals who carry a gene mutation may or may not get the disease and onset and severity can vary from one family member to the next. This explains why you may not see arrhythmia in each generation of a family.     Genetic testing is indicated for individuals with NICM and arrhythmias to better understand the cause of their heart disease, progression, the likelihood of noncardiac health risks, availability of gene therapy or enzyme replacement therapies, and risk to relatives. Genetic testing that includes a panel of genes is the most cost effective and timely approach.     DNA sample via saliva or blood is collected and sent to testing lab for evaluation of selected genes. The results could directly impact care and treatment. This testing is medically necessary.     Explained three possible outcomes of genetic testing including: positive identification of a mutation, no mutation identified, and identification of a variant of unknown significance (VUS). If a mutation is identified, presymptomatic testing would be available to at risk family members. If no mutation is identified, it does not rule out the possibility of a genetic component to this disease. Family members could still be at risk for developing the same condition. If a VUS is identified, it is unclear if the mutation is disease causing or just a normal variation. It may take time and possibly additional testing to determine the meaning of a VUS result.     Test results take approximately 2-4 weeks on average. Discussed cost of testing through commercial lab. Explained that they will work with insurance carrier and attempt to create an estimated out of pocket cost, which will be sent to the patient via email. Patient's estimated out of pocket cost for this test is: $728.16. Patient can also proactively call [employer] at [phone] to apply for their financial assistance program, which would cap patient's out of  pocket cost at $300 if accepted.     Discussed pros and cons of genetic testing. Explained that results could determine the cause for NICM and prolonged QT interval.  If a mutation is identified, presymptomatic testing is available to all at risk relatives. Reviewed possible issues associated with presymptomatic testing including genetic discrimination, current laws to prevent discrimination (ie. ISIAH), insurance issues, incidental findings, and emotional and psychosocial outcomes of testing.     Explained that clinical evaluation is recommended for all first degree relatives (parents, siblings, and children) of an affected individual regardless of decision to pursue genetic testing.  Clinical evaluation may include history, cardiac exam, EKG, event monitoring, and exercise stress testing.    All questions answered at this time.     PLAN: Sherry elected to proceed with genetic testing via a comprehensive arrhythmia and cardiomyopathy panel.  Requisition was completed and verbal consent was obtained, due to virtual visit.  A saliva sample kit will be sent directly to the patient.  Once sample is collected, kit will be mailed to [employer]  laboratory.  I will contact patient when results are available.     TOTAL TIME SPENT: I spent 40 minutes on the day of the encounter doing chart review, collecting medical and family history, counseling, documentation and further activities as noted above.    Kemi Irizarry MS, [facility] [place]  Licensed, Certified Genetic Counselor  [facility]  [facility]

## 2025-07-29 ENCOUNTER — VIRTUAL VISIT (OUTPATIENT)
Dept: CARDIOLOGY | Facility: CLINIC | Age: 65
End: 2025-07-29
Payer: COMMERCIAL

## 2025-07-29 VITALS — HEIGHT: 70 IN | WEIGHT: 144 LBS | BODY MASS INDEX: 20.62 KG/M2

## 2025-07-29 DIAGNOSIS — I47.20 PAROXYSMAL VENTRICULAR TACHYCARDIA (H): ICD-10-CM

## 2025-07-29 DIAGNOSIS — I42.8 NONISCHEMIC CARDIOMYOPATHY (H): ICD-10-CM

## 2025-07-29 DIAGNOSIS — Z87.898 HX OF PROLONGED Q-T INTERVAL ON ECG: ICD-10-CM

## 2025-07-29 DIAGNOSIS — I46.9 CARDIAC ARREST (H): Primary | ICD-10-CM

## 2025-07-29 PROCEDURE — 96041 GENETIC COUNSELING SVC EA 30: CPT | Mod: GT,95

## 2025-07-29 PROCEDURE — 1126F AMNT PAIN NOTED NONE PRSNT: CPT | Mod: 95

## 2025-07-29 ASSESSMENT — PAIN SCALES - GENERAL: PAINLEVEL_OUTOF10: NO PAIN (0)

## 2025-07-29 NOTE — LETTER
7/29/2025      RE: Sherry King  [address] [place] [address]       Dear Colleague,    Thank you for the opportunity to participate in the care of your patient, Sherry King, at the [facility]. Please see a copy of my visit note below.    Virtual Visit Details     Type of service:  Video Visit     Originating Location (pt. Location): Home  Distant Location (provider location):  Off-site  Platform used for Video Visit: Linda    Here is a copy of the progress note from your recent genetic counseling visit to [facility] on Date: 7/29/2025.    PROGRESS NOTE: Sherry was referred by Tao Huggins MD for genetic counseling due to her history of nonischemic cardiomyopathy, cardiac arrest, and a prolonged QT interval. I had the opportunity to talk with Sherry today to discuss the genetic component of these conditions and testing options available to her.     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 She was admitted in December 2023 for chest tightness, shortness of breath, and tachycardia, and suffered a VT arrest. She had a cardiac MRI at this time which showed the left ventricle to be moderately enlarged with severe reduction in global systolic function, LBBB, and areas of patchy epicardial and mid myocardial enhancement/fibrosis on LGE most consistent with myocarditis. She had a CRT-D placed after this. She was noted to have a prolonged QT interval during this time as well (QTc= 563).       FAMILY HISTORY: A detailed family history was obtained today and was significant for the following cardiac history:    Three daughters who are in good health. Two have a history of fainting, but this seems to be in the setting of standing up quickly.  Three sisters. One has a history of CAD and has had stents  placed.  Father  In his 80's with a history of multiple heart attacks with the first being in his 60s.  Mother  at age 94. She had a history of stroke.    There is no additional history of cardiomyopathy, arrhythmias, heart attacks, sudden cardiac death, genetic conditions, or birth defects. (Scanned pedigree may be under media tab)    DISCUSSION: We discussed Sherry's history of NICM, cardiac arrest, and a prolonged QT interval.      Non-ischemic cardiomyopathy results from causes other than loss of blood flow to the heart.  Cardiomyopathy can be caused by both acquired and genetic causes.  Acquired causes include exposure to toxins, injury related to coronary artery disease (ischemic), and chronic high blood pressure. When familial, it often results in dilated cardiomyopathy (DCM), where the left ventricle gets enlarged or stretched out.  There is a genetic basis found in 10-50% of DCM cases. Even when other causes are apparent, such as myocarditis, a potential underlying genetic component should not be ruled out.    Long QT syndrome (LQTS) is a disorder of the heart s electrical system that can lead to abnormal heart rhythms (arrhythmias). It is most often caused by genetic mutations, though it can also be acquired due to factors such as certain medications, electrolyte imbalances, or medical conditions. Inherited LQTS is associated with mutations in genes that regulate ion channels, which help control the electrical activity of the heart. This disruption can prolong the QT interval on an electrocardiogram (ECG), increasing the risk of life-threatening arrhythmias like torsades de pointes. Even when LQTS appears to be acquired, an underlying genetic predisposition may still contribute to the condition and should be considered.    Given Sherry's history of NICM and a prolonged QT interval, we discussed genetic testing via a comprehensive cardiomyopathy and arrhythmia panel to assess for genetic  contributions to these conditions.    Reviewed autosomal dominant (AD) inheritance pattern most commonly associated with these conditions, including the 50% risk for recurrence. Briefly reviewed autosomal recessive  Inheritance for the rare cases in which that occurs. Explained that gene mutations are associated with reduced penetrance and variable expressivity, meaning that individuals who carry a gene mutation may or may not get the disease and onset and severity can vary from one family member to the next. This explains why you may not see arrhythmia in each generation of a family.     Genetic testing is indicated for individuals with NICM and arrhythmias to better understand the cause of their heart disease, progression, the likelihood of noncardiac health risks, availability of gene therapy or enzyme replacement therapies, and risk to relatives. Genetic testing that includes a panel of genes is the most cost effective and timely approach.     DNA sample via saliva or blood is collected and sent to testing lab for evaluation of selected genes. The results could directly impact care and treatment. This testing is medically necessary.     Explained three possible outcomes of genetic testing including: positive identification of a mutation, no mutation identified, and identification of a variant of unknown significance (VUS). If a mutation is identified, presymptomatic testing would be available to at risk family members. If no mutation is identified, it does not rule out the possibility of a genetic component to this disease. Family members could still be at risk for developing the same condition. If a VUS is identified, it is unclear if the mutation is disease causing or just a normal variation. It may take time and possibly additional testing to determine the meaning of a VUS result.     Test results take approximately 2-4 weeks on average. Discussed cost of testing through commercial lab. Explained that they  will work with insurance carrier and attempt to create an estimated out of pocket cost, which will be sent to the patient via email. Patient's estimated out of pocket cost for this test is: $728.16. Patient can also proactively call [employer] at [phone] to apply for their financial assistance program, which would cap patient's out of pocket cost at $300 if accepted.     Discussed pros and cons of genetic testing. Explained that results could determine the cause for NICM and prolonged QT interval.  If a mutation is identified, presymptomatic testing is available to all at risk relatives. Reviewed possible issues associated with presymptomatic testing including genetic discrimination, current laws to prevent discrimination (ie. ISIAH), insurance issues, incidental findings, and emotional and psychosocial outcomes of testing.     Explained that clinical evaluation is recommended for all first degree relatives (parents, siblings, and children) of an affected individual regardless of decision to pursue genetic testing.  Clinical evaluation may include history, cardiac exam, EKG, event monitoring, and exercise stress testing.    All questions answered at this time.     PLAN: Sherry elected to proceed with genetic testing via a comprehensive arrhythmia and cardiomyopathy panel.  Requisition was completed and verbal consent was obtained, due to virtual visit.  A saliva sample kit will be sent directly to the patient.  Once sample is collected, kit will be mailed to [employer]  laboratory.  I will contact patient when results are available.     TOTAL TIME SPENT: I spent 40 minutes on the day of the encounter doing chart review, collecting medical and family history, counseling, documentation and further activities as noted above.    Kemi Irizarry MS, [facility] [place]  Licensed, Certified Genetic Counselor  [facility]  [facility]      Please do not hesitate to contact  me if you have any questions/concerns.     Sincerely,     Kemi Irizarry, GC

## 2025-07-29 NOTE — NURSING NOTE
Current patient location: [address] [place] [address]    Is the patient currently in the state of [place]? YES    Visit mode: VIDEO    If the visit is dropped, the patient can be reconnected by:kanwal    Will anyone else be joining the visit? NO  (If patient encounters technical issues they should call [phone] :[phone])    Are changes needed to the allergy or medication list? No    Are refills needed on medications prescribed by this physician? NO    Rooming Documentation:  Questionnaire(s) not pre-assigned    Reason for visit: Consult    Ashanti MCGARRY

## 2025-07-29 NOTE — PROGRESS NOTES
"Virtual Visit Details    Type of service:  Video Visit     Originating Location (pt. Location): {video visit patient location:847077::\"Home\"}  {PROVIDER LOCATION On-site should be selected for visits conducted from your clinic location or adjoining Health system hospital, academic office, or other nearby Health system building. Off-site should be selected for all other provider locations, including home:060284}  Distant Location (provider location):  {virtual location provider:286367}  Platform used for Video Visit: {Virtual Visit Platforms:456632::\"[email or web address]\"}    "

## 2025-07-30 NOTE — PATIENT INSTRUCTIONS
"Indication for Genetic Counseling:     Non-ischemic cardiomyopathy results from causes other than loss of blood flow to the heart.  Cardiomyopathy can be caused by both acquired and genetic causes.  Acquired causes include exposure to toxins, injury related to coronary artery disease (ischemic), and chronic high blood pressure. When familial, it often results in dilated cardiomyopathy (DCM), where the left ventricle gets enlarged or stretched out.  There is a genetic basis found in 10-50% of DCM cases. Even when other causes are apparent, such as myocarditis, a potential underlying genetic component should not be ruled out.    Long QT syndrome (LQTS) is a disorder of the heart s electrical system that can lead to abnormal heart rhythms (arrhythmias). It is most often caused by genetic mutations, though it can also be acquired due to factors such as certain medications, electrolyte imbalances, or medical conditions. Inherited LQTS is associated with mutations in genes that regulate ion channels, which help control the electrical activity of the heart. This disruption can prolong the QT interval on an electrocardiogram (ECG), increasing the risk of life-threatening arrhythmias like torsades de pointes. Even when LQTS appears to be acquired, an underlying genetic predisposition may still contribute to the condition and should be considered.    Inheritance:   Humans have over 20,000 genes that instruct our bodies how to function.  We have two copies of each gene because we inherit one from our mother and one from our father.  In most cardiac cases with a genetic component, the condition is inherited in an autosomal dominant (AD) pattern.  This means that in order to have the condition, a person needs to inherit a mutation on one copy of a particular gene.  This mutation or pathogenic variant dominates the \"normal\" working copy of the gene.  When an affected individual has children, they can either pass on the \"normal\" " copy of the gene or the mutation.  Therefore, children have a 50% chance of inheriting the mutation.  Other family members also have an increased risk but the specific risk depends on the degree of relationship.  Additional inheritance patterns can occur within families and may alter the risk of recurrence.     Testing Options:   Genetic testing is available to assess a panel of genes known to cause this condition.  This test reads through the DNA (sequencing) of these genes to look for spelling mistakes or mutations that could cause the condition.      There are three types of results you could receive from this test.     -Positive result (mutation/pathogenic variant identified) - confirms diagnosis and provides an answer to why this happened.  In addition, identifying a mutation allows family members to have testing to determine their risk.     -Negative result (mutation not identified) - no genetic changes were identified.  This does not rule out a genetic cause for the condition as the genetic testing only identifies 40-50% of genetic causes for this cardiomyopathy and ~80% of LQTS.  -Variant of uncertain significance (VUS) - a genetic change was identified, but there is not enough information to determine whether it is disease-causing or normal human genetic variation.     Although genetic testing may identify a mutation, it cannot provide information about the severity of symptoms or the progression of disease.  We cannot predict age of onset or severity of symptoms due to reduced penetrance and variable expressivity.    Logistics:   Genetic testing involves collecting a sample of DNA, thru blood, saliva, or cheek cells.  The sample will be sent to a laboratory to extract the DNA and sequence the genes for mutations. The lab will work with insurance carrier and attempt to create an estimated out of pocket cost, which will be sent to you via email. Patient's estimated out of pocket cost for this test is:  $723.16. You can also proactively call [employer] at [phone] to apply for their financial assistance program, which would cap your out of pocket cost at $300 if accepted.  When testing is initiated, results take about 2-4 weeks to return. I will contact you over the phone when results are available.     Genetic Information and Nondiscrimination Act:  The Genetic Information and Nondiscrimination Act of 2008 (ISIAH) is a federal law that protects individuals from genetic discrimination in health insurance and employment. Genetic discrimination is defined as the misuse of genetic information. This law does not address potential discrimination regarding life insurance or disability insurance.      This is especially relevant for at risk individuals who are considering presymptomatic testing.    Screening Recommendations:  Explained that clinical evaluation is recommended for all first degree relatives (parents, siblings, and children) of an affected individual regardless of decision to pursue genetic testing. Based on the Heart Failure Society of [place] Practice Guidelines (Gavin et al, 2009), clinical evaluation should be performed at least every 3-5 years beginning and childhood and should include history, cardiac exam, ECHO, and EKG.    Resources:  Cardiomyopathy  Hypertrophic Cardiomyopathy Association - 4hcm.org  Children's Cardiomyopathy Foundation - childrenscardiomyopathy.org  [place] Cardiomyopathy Association - cardiomyopathy.org  Dilated Cardiomyopathy Foundation - DCMfoundation.org    Arrhythmia  Heart Rhythm Society - HRSonline.org    General   American Heart Association - americanheart.org  Genetics Home Reference - ghr.nlm.nih.gov  Genetic Information and Nondiscrimination Act - ginahelp.org    Contact Information:  Kemi Irizarry MS, [facility] [place]  Licensed, Certified Genetic Counselor  [facility]  [facility]     Office:   [phone]  Appointments:  [phone]  Fax: [phone]  Email: [email or web address]

## 2025-08-04 ENCOUNTER — TELEPHONE (OUTPATIENT)
Dept: CARDIOLOGY | Facility: CLINIC | Age: 65
End: 2025-08-04
Payer: COMMERCIAL

## 2025-08-15 ENCOUNTER — ANCILLARY PROCEDURE (OUTPATIENT)
Dept: CARDIOLOGY | Facility: CLINIC | Age: 65
End: 2025-08-15
Attending: INTERNAL MEDICINE
Payer: COMMERCIAL

## 2025-08-15 DIAGNOSIS — I25.5 ISCHEMIC CARDIOMYOPATHY: ICD-10-CM

## 2025-08-15 DIAGNOSIS — Z95.810 CARDIAC RESYNCHRONIZATION THERAPY DEFIBRILLATOR (CRT-D) IN PLACE: ICD-10-CM

## 2025-08-15 LAB
MDC_IDC_EPISODE_DTM: NORMAL
MDC_IDC_EPISODE_DURATION: 1 S
MDC_IDC_EPISODE_DURATION: 1 S
MDC_IDC_EPISODE_DURATION: 11 S
MDC_IDC_EPISODE_DURATION: 12 S
MDC_IDC_EPISODE_DURATION: 13 S
MDC_IDC_EPISODE_DURATION: 151 S
MDC_IDC_EPISODE_DURATION: 158 S
MDC_IDC_EPISODE_DURATION: 16 S
MDC_IDC_EPISODE_DURATION: 16 S
MDC_IDC_EPISODE_DURATION: 160 S
MDC_IDC_EPISODE_DURATION: 17 S
MDC_IDC_EPISODE_DURATION: 17 S
MDC_IDC_EPISODE_DURATION: 19 S
MDC_IDC_EPISODE_DURATION: 2 S
MDC_IDC_EPISODE_DURATION: 33 S
MDC_IDC_EPISODE_DURATION: 34 S
MDC_IDC_EPISODE_DURATION: 36 S
MDC_IDC_EPISODE_DURATION: 37 S
MDC_IDC_EPISODE_DURATION: 38 S
MDC_IDC_EPISODE_DURATION: 39 S
MDC_IDC_EPISODE_DURATION: 44 S
MDC_IDC_EPISODE_DURATION: 525 S
MDC_IDC_EPISODE_DURATION: 8 S
MDC_IDC_EPISODE_DURATION: 98 S
MDC_IDC_EPISODE_ID: 113
MDC_IDC_EPISODE_ID: 114
MDC_IDC_EPISODE_ID: 115
MDC_IDC_EPISODE_ID: 116
MDC_IDC_EPISODE_ID: 117
MDC_IDC_EPISODE_ID: 118
MDC_IDC_EPISODE_ID: 119
MDC_IDC_EPISODE_ID: 120
MDC_IDC_EPISODE_ID: 121
MDC_IDC_EPISODE_ID: 122
MDC_IDC_EPISODE_ID: 123
MDC_IDC_EPISODE_ID: 124
MDC_IDC_EPISODE_ID: 125
MDC_IDC_EPISODE_ID: 126
MDC_IDC_EPISODE_ID: 127
MDC_IDC_EPISODE_ID: 128
MDC_IDC_EPISODE_ID: 129
MDC_IDC_EPISODE_ID: 130
MDC_IDC_EPISODE_ID: 131
MDC_IDC_EPISODE_ID: 132
MDC_IDC_EPISODE_ID: 133
MDC_IDC_EPISODE_ID: 134
MDC_IDC_EPISODE_ID: 135
MDC_IDC_EPISODE_ID: 136
MDC_IDC_EPISODE_ID: 137
MDC_IDC_EPISODE_ID: 138
MDC_IDC_EPISODE_ID: 139
MDC_IDC_EPISODE_ID: 67
MDC_IDC_EPISODE_TYPE: NORMAL
MDC_IDC_LEAD_CONNECTION_STATUS: NORMAL
MDC_IDC_LEAD_IMPLANT_DT: NORMAL
MDC_IDC_LEAD_LOCATION: NORMAL
MDC_IDC_LEAD_LOCATION_DETAIL_1: NORMAL
MDC_IDC_LEAD_MFG: NORMAL
MDC_IDC_LEAD_MODEL: NORMAL
MDC_IDC_LEAD_POLARITY_TYPE: NORMAL
MDC_IDC_LEAD_SERIAL: NORMAL
MDC_IDC_LEAD_SPECIAL_FUNCTION: NORMAL
MDC_IDC_MSMT_BATTERY_DTM: NORMAL
MDC_IDC_MSMT_BATTERY_REMAINING_LONGEVITY: 123 MO
MDC_IDC_MSMT_BATTERY_RRT_TRIGGER: NORMAL
MDC_IDC_MSMT_BATTERY_STATUS: NORMAL
MDC_IDC_MSMT_BATTERY_VOLTAGE: 3.02 V
MDC_IDC_MSMT_CAP_CHARGE_DTM: NORMAL
MDC_IDC_MSMT_CAP_CHARGE_ENERGY: 18 J
MDC_IDC_MSMT_CAP_CHARGE_TIME: 3.7 S
MDC_IDC_MSMT_CAP_CHARGE_TYPE: NORMAL
MDC_IDC_MSMT_LEADCHNL_LV_IMPEDANCE_VALUE: 323 OHM
MDC_IDC_MSMT_LEADCHNL_LV_IMPEDANCE_VALUE: 418 OHM
MDC_IDC_MSMT_LEADCHNL_LV_IMPEDANCE_VALUE: 665 OHM
MDC_IDC_MSMT_LEADCHNL_LV_IMPEDANCE_VALUE: 703 OHM
MDC_IDC_MSMT_LEADCHNL_LV_PACING_THRESHOLD_AMPLITUDE: 0.88 V
MDC_IDC_MSMT_LEADCHNL_LV_PACING_THRESHOLD_AMPLITUDE: 1 V
MDC_IDC_MSMT_LEADCHNL_LV_PACING_THRESHOLD_PULSEWIDTH: 0.4 MS
MDC_IDC_MSMT_LEADCHNL_LV_PACING_THRESHOLD_PULSEWIDTH: 0.4 MS
MDC_IDC_MSMT_LEADCHNL_RA_IMPEDANCE_VALUE: 418 OHM
MDC_IDC_MSMT_LEADCHNL_RA_IMPEDANCE_VALUE: 456 OHM
MDC_IDC_MSMT_LEADCHNL_RA_PACING_THRESHOLD_AMPLITUDE: 0.5 V
MDC_IDC_MSMT_LEADCHNL_RA_PACING_THRESHOLD_PULSEWIDTH: 0.4 MS
MDC_IDC_MSMT_LEADCHNL_RA_SENSING_INTR_AMPL: 4 MV
MDC_IDC_MSMT_LEADCHNL_RA_SENSING_INTR_AMPL: 4 MV
MDC_IDC_MSMT_LEADCHNL_RV_IMPEDANCE_VALUE: 323 OHM
MDC_IDC_MSMT_LEADCHNL_RV_IMPEDANCE_VALUE: 437 OHM
MDC_IDC_MSMT_LEADCHNL_RV_IMPEDANCE_VALUE: 456 OHM
MDC_IDC_MSMT_LEADCHNL_RV_PACING_THRESHOLD_AMPLITUDE: 1.25 V
MDC_IDC_MSMT_LEADCHNL_RV_PACING_THRESHOLD_PULSEWIDTH: 0.4 MS
MDC_IDC_MSMT_LEADCHNL_RV_SENSING_INTR_AMPL: 16.1 MV
MDC_IDC_PG_IMPLANT_DTM: NORMAL
MDC_IDC_PG_MFG: NORMAL
MDC_IDC_PG_MODEL: NORMAL
MDC_IDC_PG_SERIAL: NORMAL
MDC_IDC_PG_TYPE: NORMAL
MDC_IDC_SESS_CLINIC_NAME: NORMAL
MDC_IDC_SESS_DTM: NORMAL
MDC_IDC_SESS_TYPE: NORMAL
MDC_IDC_SET_BRADY_AT_MODE_SWITCH_RATE: 171 {BEATS}/MIN
MDC_IDC_SET_BRADY_LOWRATE: 60 {BEATS}/MIN
MDC_IDC_SET_BRADY_MAX_SENSOR_RATE: 120 {BEATS}/MIN
MDC_IDC_SET_BRADY_MAX_TRACKING_RATE: 130 {BEATS}/MIN
MDC_IDC_SET_BRADY_MODE: NORMAL
MDC_IDC_SET_BRADY_PAV_DELAY_HIGH: 100 MS
MDC_IDC_SET_BRADY_PAV_DELAY_LOW: 130 MS
MDC_IDC_SET_BRADY_SAV_DELAY_HIGH: 70 MS
MDC_IDC_SET_BRADY_SAV_DELAY_LOW: 100 MS
MDC_IDC_SET_CRT_PACED_CHAMBERS: NORMAL
MDC_IDC_SET_LEADCHNL_LV_PACING_AMPLITUDE: NORMAL
MDC_IDC_SET_LEADCHNL_LV_PACING_ANODE_ELECTRODE_1: NORMAL
MDC_IDC_SET_LEADCHNL_LV_PACING_ANODE_LOCATION_1: NORMAL
MDC_IDC_SET_LEADCHNL_LV_PACING_CAPTURE_MODE: NORMAL
MDC_IDC_SET_LEADCHNL_LV_PACING_CATHODE_ELECTRODE_1: NORMAL
MDC_IDC_SET_LEADCHNL_LV_PACING_CATHODE_LOCATION_1: NORMAL
MDC_IDC_SET_LEADCHNL_LV_PACING_POLARITY: NORMAL
MDC_IDC_SET_LEADCHNL_LV_PACING_PULSEWIDTH: 0.4 MS
MDC_IDC_SET_LEADCHNL_RA_PACING_AMPLITUDE: 1.5 V
MDC_IDC_SET_LEADCHNL_RA_PACING_ANODE_ELECTRODE_1: NORMAL
MDC_IDC_SET_LEADCHNL_RA_PACING_ANODE_LOCATION_1: NORMAL
MDC_IDC_SET_LEADCHNL_RA_PACING_CATHODE_ELECTRODE_1: NORMAL
MDC_IDC_SET_LEADCHNL_RA_PACING_CATHODE_LOCATION_1: NORMAL
MDC_IDC_SET_LEADCHNL_RA_PACING_POLARITY: NORMAL
MDC_IDC_SET_LEADCHNL_RA_PACING_PULSEWIDTH: 0.4 MS
MDC_IDC_SET_LEADCHNL_RA_SENSING_ANODE_ELECTRODE_1: NORMAL
MDC_IDC_SET_LEADCHNL_RA_SENSING_ANODE_LOCATION_1: NORMAL
MDC_IDC_SET_LEADCHNL_RA_SENSING_CATHODE_ELECTRODE_1: NORMAL
MDC_IDC_SET_LEADCHNL_RA_SENSING_CATHODE_LOCATION_1: NORMAL
MDC_IDC_SET_LEADCHNL_RA_SENSING_POLARITY: NORMAL
MDC_IDC_SET_LEADCHNL_RA_SENSING_SENSITIVITY: 0.3 MV
MDC_IDC_SET_LEADCHNL_RV_SENSING_ANODE_ELECTRODE_1: NORMAL
MDC_IDC_SET_LEADCHNL_RV_SENSING_ANODE_LOCATION_1: NORMAL
MDC_IDC_SET_LEADCHNL_RV_SENSING_CATHODE_ELECTRODE_1: NORMAL
MDC_IDC_SET_LEADCHNL_RV_SENSING_CATHODE_LOCATION_1: NORMAL
MDC_IDC_SET_LEADCHNL_RV_SENSING_POLARITY: NORMAL
MDC_IDC_SET_LEADCHNL_RV_SENSING_SENSITIVITY: 0.45 MV
MDC_IDC_SET_ZONE_DETECTION_BEATS_DENOMINATOR: 16 {BEATS}
MDC_IDC_SET_ZONE_DETECTION_BEATS_DENOMINATOR: 32 {BEATS}
MDC_IDC_SET_ZONE_DETECTION_BEATS_DENOMINATOR: 40 {BEATS}
MDC_IDC_SET_ZONE_DETECTION_BEATS_NUMERATOR: 16 {BEATS}
MDC_IDC_SET_ZONE_DETECTION_BEATS_NUMERATOR: 30 {BEATS}
MDC_IDC_SET_ZONE_DETECTION_BEATS_NUMERATOR: 32 {BEATS}
MDC_IDC_SET_ZONE_DETECTION_INTERVAL: 320 MS
MDC_IDC_SET_ZONE_DETECTION_INTERVAL: 350 MS
MDC_IDC_SET_ZONE_DETECTION_INTERVAL: 360 MS
MDC_IDC_SET_ZONE_DETECTION_INTERVAL: 400 MS
MDC_IDC_SET_ZONE_STATUS: NORMAL
MDC_IDC_SET_ZONE_TYPE: NORMAL
MDC_IDC_SET_ZONE_VENDOR_TYPE: NORMAL
MDC_IDC_STAT_AT_BURDEN_PERCENT: 0 %
MDC_IDC_STAT_AT_DTM_END: NORMAL
MDC_IDC_STAT_AT_DTM_START: NORMAL
MDC_IDC_STAT_AT_MODE_SW_COUNT: 0
MDC_IDC_STAT_BRADY_AP_VP_PERCENT: 0.26 %
MDC_IDC_STAT_BRADY_AP_VS_PERCENT: 0.01 %
MDC_IDC_STAT_BRADY_AS_VP_PERCENT: 81.72 %
MDC_IDC_STAT_BRADY_AS_VS_PERCENT: 18 %
MDC_IDC_STAT_BRADY_DTM_END: NORMAL
MDC_IDC_STAT_BRADY_DTM_START: NORMAL
MDC_IDC_STAT_BRADY_RA_PERCENT_PACED: 0.3 %
MDC_IDC_STAT_BRADY_RV_PERCENT_PACED: 0 %
MDC_IDC_STAT_CRT_DTM_END: NORMAL
MDC_IDC_STAT_CRT_DTM_START: NORMAL
MDC_IDC_STAT_CRT_LV_PERCENT_PACED: 81.99 %
MDC_IDC_STAT_CRT_PERCENT_PACED: 0 %
MDC_IDC_STAT_EPISODE_RECENT_COUNT: 0
MDC_IDC_STAT_EPISODE_RECENT_COUNT: 11
MDC_IDC_STAT_EPISODE_RECENT_COUNT: 127
MDC_IDC_STAT_EPISODE_RECENT_COUNT_DTM_END: NORMAL
MDC_IDC_STAT_EPISODE_RECENT_COUNT_DTM_START: NORMAL
MDC_IDC_STAT_EPISODE_TOTAL_COUNT: 0
MDC_IDC_STAT_EPISODE_TOTAL_COUNT: 11
MDC_IDC_STAT_EPISODE_TOTAL_COUNT: 128
MDC_IDC_STAT_EPISODE_TOTAL_COUNT_DTM_END: NORMAL
MDC_IDC_STAT_EPISODE_TOTAL_COUNT_DTM_START: NORMAL
MDC_IDC_STAT_EPISODE_TYPE: NORMAL
MDC_IDC_STAT_TACHYTHERAPY_ATP_DELIVERED_RECENT: 0
MDC_IDC_STAT_TACHYTHERAPY_ATP_DELIVERED_TOTAL: 0
MDC_IDC_STAT_TACHYTHERAPY_RECENT_DTM_END: NORMAL
MDC_IDC_STAT_TACHYTHERAPY_RECENT_DTM_START: NORMAL
MDC_IDC_STAT_TACHYTHERAPY_SHOCKS_ABORTED_RECENT: 0
MDC_IDC_STAT_TACHYTHERAPY_SHOCKS_ABORTED_TOTAL: 0
MDC_IDC_STAT_TACHYTHERAPY_SHOCKS_DELIVERED_RECENT: 0
MDC_IDC_STAT_TACHYTHERAPY_SHOCKS_DELIVERED_TOTAL: 0
MDC_IDC_STAT_TACHYTHERAPY_TOTAL_DTM_END: NORMAL
MDC_IDC_STAT_TACHYTHERAPY_TOTAL_DTM_START: NORMAL

## 2025-08-15 PROCEDURE — 93284 PRGRMG EVAL IMPLANTABLE DFB: CPT | Performed by: INTERNAL MEDICINE

## 2025-08-26 ENCOUNTER — HOSPITAL ENCOUNTER (OUTPATIENT)
Dept: CARDIOLOGY | Facility: HOSPITAL | Age: 65
Discharge: HOME | End: 2025-08-26
Payer: COMMERCIAL

## 2025-08-26 LAB — LVEF ECHO: NORMAL

## 2025-08-26 PROCEDURE — 93306 TTE W/DOPPLER COMPLETE: CPT

## 2025-08-26 PROCEDURE — 93306 TTE W/DOPPLER COMPLETE: CPT | Mod: 26 | Performed by: INTERNAL MEDICINE

## (undated) DEVICE — SYR ANGIOGRAPHY MULTIUSE KIT ACIST 014612

## (undated) DEVICE — CATH DIAGNOSTIC RADIAL 5FR TIG 4.0

## (undated) DEVICE — SLEEVE TR BAND RADIAL COMPRESSION DEVICE 24CM TRB24-REG

## (undated) DEVICE — KIT HAND CONTROL ACIST 014644 AR-P54

## (undated) DEVICE — INTRO MICRO MINI STICK 4FR STIFF NITINOL 45-753

## (undated) DEVICE — CUSTOM PACK PACER ICD SAN5BPCHEA

## (undated) DEVICE — ELECTRODE DEFIB CADENCE 22550R

## (undated) DEVICE — SHEATH PRELUDE SNAP 25CM 9FR

## (undated) DEVICE — SHTH INTRO 0.021IN ID 6FR DIA

## (undated) DEVICE — SHEATH PRELUDE SNAP 25CM 8FR

## (undated) DEVICE — CATH, QUADRAPOLAR DEFLECTABLE EP 5MM SPACING REPROCESSED

## (undated) DEVICE — MANIFOLD KIT ANGIO AUTOMATED 014613

## (undated) DEVICE — CATH 5.7FRID/8.4FROD 9FR X 43CM INTRO SELECTSITE DEFLECT

## (undated) RX ORDER — FENTANYL CITRATE 50 UG/ML
INJECTION, SOLUTION INTRAMUSCULAR; INTRAVENOUS
Status: DISPENSED
Start: 2023-12-26

## (undated) RX ORDER — FENTANYL CITRATE 50 UG/ML
INJECTION, SOLUTION INTRAMUSCULAR; INTRAVENOUS
Status: DISPENSED
Start: 2023-12-28

## (undated) RX ORDER — DEXMEDETOMIDINE HYDROCHLORIDE 4 UG/ML
INJECTION, SOLUTION INTRAVENOUS
Status: DISPENSED
Start: 2023-12-28

## (undated) RX ORDER — CEFAZOLIN SODIUM/WATER 2 G/20 ML
SYRINGE (ML) INTRAVENOUS
Status: DISPENSED
Start: 2023-12-28